# Patient Record
Sex: MALE | Race: WHITE | NOT HISPANIC OR LATINO | Employment: OTHER | URBAN - METROPOLITAN AREA
[De-identification: names, ages, dates, MRNs, and addresses within clinical notes are randomized per-mention and may not be internally consistent; named-entity substitution may affect disease eponyms.]

---

## 2017-07-14 ENCOUNTER — HOSPITAL ENCOUNTER (EMERGENCY)
Facility: HOSPITAL | Age: 42
Discharge: ANOTHER HOSPITAL | End: 2017-07-14
Attending: EMERGENCY MEDICINE
Payer: MEDICARE

## 2017-07-14 ENCOUNTER — HOSPITAL ENCOUNTER (INPATIENT)
Facility: HOSPITAL | Age: 42
LOS: 1 days | Discharge: HOME | DRG: 065 | End: 2017-07-15
Attending: EMERGENCY MEDICINE | Admitting: PSYCHIATRY & NEUROLOGY
Payer: MEDICARE

## 2017-07-14 VITALS
HEIGHT: 75 IN | WEIGHT: 207 LBS | HEART RATE: 74 BPM | OXYGEN SATURATION: 98 % | SYSTOLIC BLOOD PRESSURE: 146 MMHG | DIASTOLIC BLOOD PRESSURE: 83 MMHG | RESPIRATION RATE: 18 BRPM | BODY MASS INDEX: 25.74 KG/M2

## 2017-07-14 DIAGNOSIS — I63.9 ACUTE CVA (CEREBROVASCULAR ACCIDENT): Primary | ICD-10-CM

## 2017-07-14 DIAGNOSIS — I63.9 CEREBROVASCULAR ACCIDENT (CVA), UNSPECIFIED MECHANISM: ICD-10-CM

## 2017-07-14 DIAGNOSIS — R07.9 CHEST PAIN: ICD-10-CM

## 2017-07-14 DIAGNOSIS — Z92.82 TISSUE PLASMINOGEN ACTIVATOR (T-PA) ADMINISTERED AT OTHER FACILITY WITHIN 24 HOURS PRIOR TO CURRENT ADMISSION: ICD-10-CM

## 2017-07-14 DIAGNOSIS — I63.412 EMBOLIC STROKE INVOLVING LEFT MIDDLE CEREBRAL ARTERY: ICD-10-CM

## 2017-07-14 DIAGNOSIS — I63.9 STROKE: ICD-10-CM

## 2017-07-14 DIAGNOSIS — I63.312 THROMBOTIC STROKE INVOLVING LEFT MIDDLE CEREBRAL ARTERY: Primary | ICD-10-CM

## 2017-07-14 PROBLEM — G81.91 HEMIPARESIS, RIGHT: Status: ACTIVE | Noted: 2017-07-14

## 2017-07-14 PROBLEM — F17.200 CURRENT EVERY DAY SMOKER: Status: ACTIVE | Noted: 2017-07-14

## 2017-07-14 PROBLEM — E78.2 MIXED HYPERLIPIDEMIA: Status: ACTIVE | Noted: 2017-07-14

## 2017-07-14 PROBLEM — E78.00 HIGH CHOLESTEROL: Status: ACTIVE | Noted: 2017-07-14

## 2017-07-14 LAB
ABO + RH BLD: NORMAL
ALBUMIN SERPL BCP-MCNC: 3.2 G/DL
ALBUMIN SERPL BCP-MCNC: 3.3 G/DL
ALP SERPL-CCNC: 94 U/L
ALP SERPL-CCNC: 95 U/L
ALT SERPL W/O P-5'-P-CCNC: 14 U/L
ALT SERPL W/O P-5'-P-CCNC: 15 U/L
ANION GAP SERPL CALC-SCNC: 11 MMOL/L
ANION GAP SERPL CALC-SCNC: 9 MMOL/L
APTT BLDCRRT: 23.7 SEC
AST SERPL-CCNC: 13 U/L
AST SERPL-CCNC: 13 U/L
BASOPHILS # BLD AUTO: 0 K/UL
BASOPHILS # BLD AUTO: 0.04 K/UL
BASOPHILS NFR BLD: 0.2 %
BASOPHILS NFR BLD: 0.6 %
BILIRUB SERPL-MCNC: 0.2 MG/DL
BILIRUB SERPL-MCNC: 0.3 MG/DL
BILIRUB UR QL STRIP: NEGATIVE
BLD GP AB SCN CELLS X3 SERPL QL: NORMAL
BUN SERPL-MCNC: 6 MG/DL
BUN SERPL-MCNC: 6 MG/DL
CALCIUM SERPL-MCNC: 8.6 MG/DL
CALCIUM SERPL-MCNC: 9 MG/DL
CHLORIDE SERPL-SCNC: 105 MMOL/L
CHLORIDE SERPL-SCNC: 108 MMOL/L
CHOLEST/HDLC SERPL: 7.2 {RATIO}
CHOLEST/HDLC SERPL: 7.3 {RATIO}
CK MB SERPL-MCNC: 1.1 NG/ML
CK MB SERPL-RTO: 1.5 %
CK SERPL-CCNC: 71 U/L
CK SERPL-CCNC: 73 U/L
CLARITY UR REFRACT.AUTO: CLEAR
CO2 SERPL-SCNC: 23 MMOL/L
CO2 SERPL-SCNC: 24 MMOL/L
COLOR UR AUTO: YELLOW
CREAT SERPL-MCNC: 0.8 MG/DL
CREAT SERPL-MCNC: 0.9 MG/DL
DIASTOLIC DYSFUNCTION: NO
DIFFERENTIAL METHOD: ABNORMAL
DIFFERENTIAL METHOD: NORMAL
EOSINOPHIL # BLD AUTO: 0.2 K/UL
EOSINOPHIL # BLD AUTO: 0.2 K/UL
EOSINOPHIL NFR BLD: 3.2 %
EOSINOPHIL NFR BLD: 3.7 %
ERYTHROCYTE [DISTWIDTH] IN BLOOD BY AUTOMATED COUNT: 12 %
ERYTHROCYTE [DISTWIDTH] IN BLOOD BY AUTOMATED COUNT: 12.3 %
EST. GFR  (AFRICAN AMERICAN): >60 ML/MIN/1.73 M^2
EST. GFR  (AFRICAN AMERICAN): >60 ML/MIN/1.73 M^2
EST. GFR  (NON AFRICAN AMERICAN): >60 ML/MIN/1.73 M^2
EST. GFR  (NON AFRICAN AMERICAN): >60 ML/MIN/1.73 M^2
ESTIMATED AVG GLUCOSE: 103 MG/DL
GLUCOSE SERPL-MCNC: 104 MG/DL
GLUCOSE SERPL-MCNC: 144 MG/DL
GLUCOSE UR QL STRIP: NEGATIVE
HBA1C MFR BLD HPLC: 5.2 %
HCT VFR BLD AUTO: 40.5 %
HCT VFR BLD AUTO: 41.1 %
HDL/CHOLESTEROL RATIO: 13.8 %
HDL/CHOLESTEROL RATIO: 13.9 %
HDLC SERPL-MCNC: 201 MG/DL
HDLC SERPL-MCNC: 225 MG/DL
HDLC SERPL-MCNC: 28 MG/DL
HDLC SERPL-MCNC: 31 MG/DL
HGB BLD-MCNC: 14.4 G/DL
HGB BLD-MCNC: 14.5 G/DL
HGB UR QL STRIP: NEGATIVE
INR PPP: 1
INR PPP: 1
KETONES UR QL STRIP: NEGATIVE
LDLC SERPL CALC-MCNC: 129.4 MG/DL
LDLC SERPL CALC-MCNC: 140.2 MG/DL
LEUKOCYTE ESTERASE UR QL STRIP: NEGATIVE
LYMPHOCYTES # BLD AUTO: 2.7 K/UL
LYMPHOCYTES # BLD AUTO: 2.7 K/UL
LYMPHOCYTES NFR BLD: 38.9 %
LYMPHOCYTES NFR BLD: 41.7 %
MCH RBC QN AUTO: 30.1 PG
MCH RBC QN AUTO: 30.2 PG
MCHC RBC AUTO-ENTMCNC: 35.2 %
MCHC RBC AUTO-ENTMCNC: 35.6 %
MCV RBC AUTO: 85 FL
MCV RBC AUTO: 86 FL
MONOCYTES # BLD AUTO: 0.5 K/UL
MONOCYTES # BLD AUTO: 0.5 K/UL
MONOCYTES NFR BLD: 6.5 %
MONOCYTES NFR BLD: 8.4 %
NEUTROPHILS # BLD AUTO: 2.9 K/UL
NEUTROPHILS # BLD AUTO: 3.5 K/UL
NEUTROPHILS NFR BLD: 45.4 %
NEUTROPHILS NFR BLD: 51.2 %
NITRITE UR QL STRIP: NEGATIVE
NONHDLC SERPL-MCNC: 173 MG/DL
NONHDLC SERPL-MCNC: 194 MG/DL
PH UR STRIP: 7 [PH] (ref 5–8)
PLATELET # BLD AUTO: 264 K/UL
PLATELET # BLD AUTO: 277 K/UL
PMV BLD AUTO: 7.9 FL
PMV BLD AUTO: 9.5 FL
POCT GLUCOSE: 107 MG/DL (ref 70–110)
POCT GLUCOSE: 110 MG/DL (ref 70–110)
POCT GLUCOSE: 114 MG/DL (ref 70–110)
POCT GLUCOSE: 154 MG/DL (ref 70–110)
POTASSIUM SERPL-SCNC: 3.5 MMOL/L
POTASSIUM SERPL-SCNC: 4 MMOL/L
PROT SERPL-MCNC: 6.6 G/DL
PROT SERPL-MCNC: 6.7 G/DL
PROT UR QL STRIP: NEGATIVE
PROTHROMBIN TIME: 10 SEC
PROTHROMBIN TIME: 10.7 SEC
RBC # BLD AUTO: 4.78 M/UL
RBC # BLD AUTO: 4.79 M/UL
RETIRED EF AND QEF - SEE NOTES: 65 (ref 55–65)
SODIUM SERPL-SCNC: 140 MMOL/L
SODIUM SERPL-SCNC: 140 MMOL/L
SP GR UR STRIP: >=1.03 (ref 1–1.03)
TRIGL SERPL-MCNC: 218 MG/DL
TRIGL SERPL-MCNC: 269 MG/DL
TROPONIN I SERPL DL<=0.01 NG/ML-MCNC: <0.006 NG/ML
TROPONIN I SERPL DL<=0.01 NG/ML-MCNC: <0.006 NG/ML
TSH SERPL DL<=0.005 MIU/L-ACNC: 3.78 UIU/ML
URN SPEC COLLECT METH UR: ABNORMAL
UROBILINOGEN UR STRIP-ACNC: NEGATIVE EU/DL
WBC # BLD AUTO: 6.45 K/UL
WBC # BLD AUTO: 6.9 K/UL

## 2017-07-14 PROCEDURE — 84484 ASSAY OF TROPONIN QUANT: CPT | Mod: 91

## 2017-07-14 PROCEDURE — 82962 GLUCOSE BLOOD TEST: CPT

## 2017-07-14 PROCEDURE — 81003 URINALYSIS AUTO W/O SCOPE: CPT

## 2017-07-14 PROCEDURE — 25000003 PHARM REV CODE 250: Performed by: PHYSICIAN ASSISTANT

## 2017-07-14 PROCEDURE — 63600175 PHARM REV CODE 636 W HCPCS: Performed by: EMERGENCY MEDICINE

## 2017-07-14 PROCEDURE — 99223 1ST HOSP IP/OBS HIGH 75: CPT | Mod: AI,GC,, | Performed by: PSYCHIATRY & NEUROLOGY

## 2017-07-14 PROCEDURE — 93010 ELECTROCARDIOGRAM REPORT: CPT | Mod: ,,, | Performed by: INTERNAL MEDICINE

## 2017-07-14 PROCEDURE — 86900 BLOOD TYPING SEROLOGIC ABO: CPT

## 2017-07-14 PROCEDURE — 99291 CRITICAL CARE FIRST HOUR: CPT | Mod: ,,, | Performed by: EMERGENCY MEDICINE

## 2017-07-14 PROCEDURE — 82553 CREATINE MB FRACTION: CPT

## 2017-07-14 PROCEDURE — G8996 SWALLOW CURRENT STATUS: HCPCS | Mod: CI

## 2017-07-14 PROCEDURE — 85730 THROMBOPLASTIN TIME PARTIAL: CPT

## 2017-07-14 PROCEDURE — 20000000 HC ICU ROOM

## 2017-07-14 PROCEDURE — 83036 HEMOGLOBIN GLYCOSYLATED A1C: CPT

## 2017-07-14 PROCEDURE — 80061 LIPID PANEL: CPT

## 2017-07-14 PROCEDURE — 25000003 PHARM REV CODE 250: Performed by: NURSE PRACTITIONER

## 2017-07-14 PROCEDURE — 80061 LIPID PANEL: CPT | Mod: 91

## 2017-07-14 PROCEDURE — 93306 TTE W/DOPPLER COMPLETE: CPT

## 2017-07-14 PROCEDURE — G8997 SWALLOW GOAL STATUS: HCPCS | Mod: CH

## 2017-07-14 PROCEDURE — 80053 COMPREHEN METABOLIC PANEL: CPT

## 2017-07-14 PROCEDURE — G0425 INPT/ED TELECONSULT30: HCPCS | Mod: GT,,, | Performed by: PSYCHIATRY & NEUROLOGY

## 2017-07-14 PROCEDURE — G8979 MOBILITY GOAL STATUS: HCPCS | Mod: CH

## 2017-07-14 PROCEDURE — 92610 EVALUATE SWALLOWING FUNCTION: CPT

## 2017-07-14 PROCEDURE — 85610 PROTHROMBIN TIME: CPT

## 2017-07-14 PROCEDURE — 36415 COLL VENOUS BLD VENIPUNCTURE: CPT

## 2017-07-14 PROCEDURE — 94761 N-INVAS EAR/PLS OXIMETRY MLT: CPT

## 2017-07-14 PROCEDURE — 85610 PROTHROMBIN TIME: CPT | Mod: 91

## 2017-07-14 PROCEDURE — 96374 THER/PROPH/DIAG INJ IV PUSH: CPT

## 2017-07-14 PROCEDURE — 97161 PT EVAL LOW COMPLEX 20 MIN: CPT

## 2017-07-14 PROCEDURE — 99291 CRITICAL CARE FIRST HOUR: CPT | Mod: 25

## 2017-07-14 PROCEDURE — 80307 DRUG TEST PRSMV CHEM ANLYZR: CPT

## 2017-07-14 PROCEDURE — 85025 COMPLETE CBC W/AUTO DIFF WBC: CPT | Mod: 91

## 2017-07-14 PROCEDURE — 82550 ASSAY OF CK (CPK): CPT | Mod: 91

## 2017-07-14 PROCEDURE — 93005 ELECTROCARDIOGRAM TRACING: CPT

## 2017-07-14 PROCEDURE — 25500020 PHARM REV CODE 255: Performed by: EMERGENCY MEDICINE

## 2017-07-14 PROCEDURE — 85025 COMPLETE CBC W/AUTO DIFF WBC: CPT

## 2017-07-14 PROCEDURE — 84443 ASSAY THYROID STIM HORMONE: CPT

## 2017-07-14 PROCEDURE — 93306 TTE W/DOPPLER COMPLETE: CPT | Mod: 26,,, | Performed by: INTERNAL MEDICINE

## 2017-07-14 PROCEDURE — 86850 RBC ANTIBODY SCREEN: CPT

## 2017-07-14 PROCEDURE — 99233 SBSQ HOSP IP/OBS HIGH 50: CPT | Mod: GC,,, | Performed by: PSYCHIATRY & NEUROLOGY

## 2017-07-14 PROCEDURE — 37195 THROMBOLYTIC THERAPY STROKE: CPT

## 2017-07-14 PROCEDURE — 84484 ASSAY OF TROPONIN QUANT: CPT

## 2017-07-14 PROCEDURE — 80053 COMPREHEN METABOLIC PANEL: CPT | Mod: 91

## 2017-07-14 PROCEDURE — G8978 MOBILITY CURRENT STATUS: HCPCS | Mod: CH

## 2017-07-14 RX ORDER — LEVOTHYROXINE SODIUM 25 UG/1
25 TABLET ORAL DAILY
COMMUNITY
End: 2020-12-23

## 2017-07-14 RX ORDER — OXYCODONE HYDROCHLORIDE 5 MG/1
15 TABLET ORAL 3 TIMES DAILY
Status: DISCONTINUED | OUTPATIENT
Start: 2017-07-14 | End: 2017-07-15 | Stop reason: HOSPADM

## 2017-07-14 RX ORDER — DIAZEPAM 10 MG/1
10 TABLET ORAL NIGHTLY PRN
COMMUNITY
End: 2017-10-13

## 2017-07-14 RX ORDER — LABETALOL HYDROCHLORIDE 5 MG/ML
10 INJECTION, SOLUTION INTRAVENOUS
Status: DISCONTINUED | OUTPATIENT
Start: 2017-07-14 | End: 2017-07-15 | Stop reason: HOSPADM

## 2017-07-14 RX ORDER — DIAZEPAM 5 MG/1
10 TABLET ORAL NIGHTLY PRN
Status: DISCONTINUED | OUTPATIENT
Start: 2017-07-14 | End: 2017-07-15 | Stop reason: HOSPADM

## 2017-07-14 RX ORDER — ACETAMINOPHEN 325 MG/1
650 TABLET ORAL EVERY 6 HOURS PRN
Status: DISCONTINUED | OUTPATIENT
Start: 2017-07-14 | End: 2017-07-15 | Stop reason: HOSPADM

## 2017-07-14 RX ORDER — IBUPROFEN 200 MG
1 TABLET ORAL DAILY
Status: DISCONTINUED | OUTPATIENT
Start: 2017-07-14 | End: 2017-07-15 | Stop reason: HOSPADM

## 2017-07-14 RX ORDER — GABAPENTIN 300 MG/1
600 CAPSULE ORAL 2 TIMES DAILY
Status: DISCONTINUED | OUTPATIENT
Start: 2017-07-14 | End: 2017-07-15 | Stop reason: HOSPADM

## 2017-07-14 RX ORDER — ROSUVASTATIN CALCIUM 20 MG/1
20 TABLET, COATED ORAL DAILY
Status: DISCONTINUED | OUTPATIENT
Start: 2017-07-14 | End: 2017-07-15

## 2017-07-14 RX ORDER — OXYCODONE HYDROCHLORIDE 15 MG/1
15 TABLET, FILM COATED, EXTENDED RELEASE ORAL EVERY 12 HOURS PRN
COMMUNITY
End: 2020-12-23

## 2017-07-14 RX ORDER — GABAPENTIN 300 MG/1
1200 CAPSULE ORAL 2 TIMES DAILY
Status: DISCONTINUED | OUTPATIENT
Start: 2017-07-14 | End: 2017-07-15 | Stop reason: HOSPADM

## 2017-07-14 RX ORDER — SODIUM CHLORIDE 0.9 % (FLUSH) 0.9 %
3 SYRINGE (ML) INJECTION EVERY 8 HOURS
Status: DISCONTINUED | OUTPATIENT
Start: 2017-07-14 | End: 2017-07-15 | Stop reason: HOSPADM

## 2017-07-14 RX ORDER — GABAPENTIN 600 MG/1
600 TABLET ORAL 3 TIMES DAILY
COMMUNITY
End: 2021-02-23 | Stop reason: SDUPTHER

## 2017-07-14 RX ORDER — AMOXICILLIN 250 MG
1 CAPSULE ORAL DAILY
Status: DISCONTINUED | OUTPATIENT
Start: 2017-07-14 | End: 2017-07-15 | Stop reason: HOSPADM

## 2017-07-14 RX ORDER — IBUPROFEN 200 MG
1 TABLET ORAL DAILY
Status: DISCONTINUED | OUTPATIENT
Start: 2017-07-14 | End: 2017-07-14

## 2017-07-14 RX ADMIN — GABAPENTIN 1200 MG: 300 CAPSULE ORAL at 09:07

## 2017-07-14 RX ADMIN — OXYCODONE HYDROCHLORIDE 15 MG: 5 TABLET ORAL at 01:07

## 2017-07-14 RX ADMIN — ROSUVASTATIN CALCIUM 20 MG: 20 TABLET, FILM COATED ORAL at 10:07

## 2017-07-14 RX ADMIN — ACETAMINOPHEN 650 MG: 325 TABLET ORAL at 05:07

## 2017-07-14 RX ADMIN — OXYCODONE HYDROCHLORIDE 15 MG: 5 TABLET ORAL at 07:07

## 2017-07-14 RX ADMIN — ALTEPLASE 76 MG: KIT at 05:07

## 2017-07-14 RX ADMIN — NICOTINE 1 PATCH: 14 PATCH, EXTENDED RELEASE TRANSDERMAL at 08:07

## 2017-07-14 RX ADMIN — GABAPENTIN 1200 MG: 300 CAPSULE ORAL at 05:07

## 2017-07-14 RX ADMIN — ACETAMINOPHEN 650 MG: 325 TABLET ORAL at 09:07

## 2017-07-14 RX ADMIN — Medication 3 ML: at 01:07

## 2017-07-14 RX ADMIN — ACETAMINOPHEN 650 MG: 325 TABLET ORAL at 12:07

## 2017-07-14 RX ADMIN — NICOTINE 1 PATCH: 21 PATCH, EXTENDED RELEASE TRANSDERMAL at 01:07

## 2017-07-14 RX ADMIN — STANDARDIZED SENNA CONCENTRATE AND DOCUSATE SODIUM 1 TABLET: 8.6; 5 TABLET, FILM COATED ORAL at 01:07

## 2017-07-14 RX ADMIN — Medication 3 ML: at 09:07

## 2017-07-14 RX ADMIN — IOHEXOL 100 ML: 350 INJECTION, SOLUTION INTRAVENOUS at 05:07

## 2017-07-14 RX ADMIN — DIAZEPAM 10 MG: 5 TABLET ORAL at 09:07

## 2017-07-14 RX ADMIN — GABAPENTIN 600 MG: 300 CAPSULE ORAL at 01:07

## 2017-07-14 NOTE — ED NOTES
"Pt given Tylenol for complaint of back pain, pt with rods to back.  Pt states that he starts with pain if he lays down too long.  Pain rated 10/10.  Pt reports that he usually takes "roxicontin" for pain and he will talk to the doctor about his pain meds.  "

## 2017-07-14 NOTE — PLAN OF CARE
Problem: SLP Goal  Goal: SLP Goal  Speech Language Pathology Goals  Goals expected to be met by 7/21    1. Pt will participate in speech, language and cognitive assessment to rule out deficits s/p neurological event.   2. Pt will tolerate regular diet with thin liquids with no overt s/s of aspiration.             Recommendations:  1. Regular consistency diet  2. Thin liquids  3. Standard Aspiration Precautions      Christin Luz, CCC-SLP  Speech Language Pathologist  [phone]  7/14/2017

## 2017-07-14 NOTE — ED NOTES
"Pt reports headache "better", rated 2/10.  Pt states " I just think I need to eat".  Informed pt that speech therapist, Michelle, will be at the bedside soon for swallow study.  Pt v/u.  Siderails up x 2/call light in reach, family at bedside.  Lights dimmed for comfort.  "

## 2017-07-14 NOTE — ED NOTES
"Pt reports back pain 6/10, states " I usually stay at a 5 to an 8".  Pt without complaints at present.  Awaiting clean room assignment.  Siderails up x 2/call light in reach.  Family remains at bedside.  "

## 2017-07-14 NOTE — PLAN OF CARE
Problem: Physical Therapy Goal  Goal: Physical Therapy Goal  Goals to be met by: 17     Patient will increase functional independence with mobility by performin. Supine to sit with [place]  2. Sit to supine with [place]  3. Gait  x 50 feet with [place] with or without using Single-point Cane .   4. Ascend/descend 4 stair with right Handrails Supervision.           PT evaluation completed. POC and goals established.    Stephany Lagunas, PT, DPT  2017

## 2017-07-14 NOTE — PT/OT/SLP EVAL
"Speech Language Pathology  Evaluation    Michael Alonzo   MRN: [id number]   Admitting Diagnosis: Acute ischemic stroke    Diet recommendations: Solid Diet Level: Regular  Liquid Diet Level: Thin Small bites/sips, Alternating bites/sips, 1 bite/sip at a time, Remain upright 30 minutes post meal, Meds whole 1 at a time, Eliminate distractions and Avoid talking while eating    SLP Treatment Date: 07/14/17  Speech Start Time: 1050     Speech Stop Time: 1105     Speech Total (min): 15 min       TREATMENT BILLABLE MINUTES:  Eval Swallow and Oral Function 15    Diagnosis: Acute ischemic stroke      Past Medical History:   Diagnosis Date    Arthritis     High cholesterol      Past Surgical History:   Procedure Laterality Date    SPINAL FIXATION SURGERY         Has the patient been evaluated by SLP for swallowing? : Yes  Keep patient NPO?: No   General Precautions: Standard, aspiration, fall    Current Respiratory Status:  (room air)     Social Hx: Patient lives with wife and 3 sons.    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    Subjective:  Patient is awake and alert. Sitting fully upright on stretcher making jokes. Family at .   Patient goals: "Get out of her ASAP"    Pain/Comfort  Pain Rating 1: 9/10  Location - Side 1: Bilateral  Location 1: other (see comments) (body (head, R shoulder, lower body (left and right))  Pain Addressed 1: Nurse notified  Pain Rating Post-Intervention 1: 9/10    Objective:   Patient found with: blood pressure cuff, peripheral IV    Oral Musculature Evaluation  Oral Musculature: very Slight facial droop on right  Dentition: edentulous (Dentures were not present, but patient eats in absence of dentures.)  Mucosal Quality: adequate  Mandibular Strength and Mobility: WFL  Oral Labial Strength and Mobility: WFL  Lingual Strength and Mobility: WFL  Velar Elevation: " WFL  Buccal Strength and Mobility: WFL  Volitional Cough: strong/productive  Volitional Swallow: adequate laryngeal excursion to finger palpation  Voice Prior to PO Intake: clear, strong     Bedside Swallow Eval:  Consistencies Assessed: Thin liquids (~4oz of water via cup and straw), Puree (4 tsps of pudding ) and Solids (1 full cracker)  Oral Phase: prolonged mastication of solids 2/2 to being edentulous otherwise oral phase WFL for all other consistencies.  Pharyngeal Phase: no overt clinical  signs/symptoms of aspiration and no overt clinical signs/symptoms of pharyngeal dysphagia    Additional Treatment:      FIM:  Social Interaction: 6 Complete [place]--The patient interacts appropriately with staff, other patients, and family members (e.g., controls temper, accepts criticism, is aware that words and actions have an impact on others), and does not require medication for                            Assessment:  Michael Alonzo is a 42 y.o. male with a medical diagnosis of Acute ischemic stroke and presents with risk for aspiration.    Do you have any cultural, spiritual, Hindu conflicts, given your current situation?: none reported     Discharge recommendations: Discharge Facility/Level Of Care Needs: other (see comments) (TBD post cognitve evaluation)     Goals:    SLP Goals        Problem: SLP Goal    Goal Priority Disciplines Outcome   SLP Goal     SLP    Description:  Speech Language Pathology Goals  Goals expected to be met by 7/21    1. Pt will participate in speech, language and cognitive assessment to rule out deficits s/p neurological event.   2. Pt will tolerate regular diet with thin liquids with no overt s/s of aspiration.                            Plan:   Patient to be seen Therapy Frequency: 3 x/week   Plan of Care expires: 08/13/17  Plan of Care reviewed with: patient, son, friend  SLP Follow-up?: Yes              Michelle Rodriguez CCC-SLP  07/14/2017

## 2017-07-14 NOTE — ED PROVIDER NOTES
"Encounter Date: 7/14/2017    SCRIBE #1 NOTE: I, Ifrah Suazo, am scribing for, and in the presence of,  Dr. Elizondo. I have scribed the following portions of the note - Other sections scribed: HPI, ROS, PE.       History     Chief Complaint   Patient presents with    Cerebrovascular Accident     tPa transfer from [facility]     Time patient was seen by the provider: 5:52 AM       The patient is a 42 y.o. male that presents to the ED as a transfer from [facility]. Pt presented to [place] [facility] ED at 4:23 am for acute onset of right sided weakness. Pt had gone to bed at 1 am and woke up when he "felt a jolt" at 2 am, developing right sided facial numbness and right upper and lower extremity weakness. In the ED he had significant weakness to his right upper extremities with decreased sensation on the right side. Telemedicine stroke was consulted and the decision was made to administer TPA to the pt. He was transferred here for further management.    On our initial assessment, pt c/o diffuse pain all over his body.  Reports persistent R sided weakness/numbness.  Denies fever/chills      The history is provided by the patient and medical records.     Review of patient's allergies indicates:   Allergen Reactions    Tramadol Anaphylaxis    Tomato (solanum lycopersicum) Hives     Past Medical History:   Diagnosis Date    Arthritis     High cholesterol      Past Surgical History:   Procedure Laterality Date    SPINAL FIXATION SURGERY       History reviewed. No pertinent family history.  Social History   Substance Use Topics    Smoking status: Current Every Day Smoker     Packs/day: 0.50     Types: Cigarettes    Smokeless tobacco: Never Used    Alcohol use No     Review of Systems   Constitutional: Negative for fever.   HENT: Negative for mouth sores.    Eyes: Negative for photophobia.   Respiratory: Negative for apnea.    Gastrointestinal: Negative for blood in stool.   Genitourinary: Negative for " genital sores.   Musculoskeletal: Negative for neck pain.   Skin: Negative for rash.   Neurological: Positive for weakness (Right upper and lower extremities.) and numbness (Right sided facial).   Psychiatric/Behavioral: Negative for self-injury.       Physical Exam     Initial Vitals [07/14/17 0551]   BP Pulse Resp Temp SpO2   -- -- -- -- 98 %      MAP       --         Physical Exam    Nursing note and vitals reviewed.  Constitutional: He appears well-developed and well-nourished. He is not diaphoretic. No distress.   HENT:   Head: Normocephalic and atraumatic.   Mouth/Throat: Oropharynx is clear and moist. No oropharyngeal exudate.   Eyes: Conjunctivae and EOM are normal. Pupils are equal, round, and reactive to light.   Neck: Normal range of motion. Neck supple.   Cardiovascular: Normal rate and regular rhythm.   Pulmonary/Chest: Breath sounds normal.   Abdominal: He exhibits no distension. There is no tenderness.   Musculoskeletal: He exhibits no edema.   Neurological: He is alert. A sensory deficit is present. No cranial nerve deficit.   + R pronator drift,  strength 4/5 R, 5/5 L,  LE strength 4/5 R, 5/5 L    No clear aphasia   Skin: Skin is warm and dry. No rash noted.   Psychiatric: He has a normal mood and affect.         ED Course   Critical Care  Date/Time: 7/14/2017 6:18 AM  Performed by: HARISH GUEVARA.  Authorized by: HARISH GUEVARA   Total critical care time (exclusive of procedural time) : 30 minutes  Critical care time was exclusive of separately billable procedures and treating other patients and teaching time.  Critical care was necessary to treat or prevent imminent or life-threatening deterioration of the following conditions: CNS failure or compromise.  Critical care was time spent personally by me on the following activities: discussions with consultants, evaluation of patient's response to treatment, obtaining history from patient or surrogate, ordering and review of laboratory  studies, pulse oximetry, review of old charts, re-evaluation of patient's condition, ordering and review of radiographic studies, ordering and performing treatments and interventions, examination of patient and development of treatment plan with patient or surrogate.        Labs Reviewed   LIPID PANEL - Abnormal; Notable for the following:        Result Value    Cholesterol 201 (*)     Triglycerides 218 (*)     HDL 28 (*)     HDL/Chol Ratio 13.9 (*)     Total Cholesterol/HDL Ratio 7.2 (*)     All other components within normal limits    Narrative:     add on per dr Elizondo order [id number] CPK @0632 7/14/17   COMPREHENSIVE METABOLIC PANEL - Abnormal; Notable for the following:     Calcium 8.6 (*)     Albumin 3.2 (*)     All other components within normal limits   URINALYSIS - Abnormal; Notable for the following:     Specific Gravity, UA >=1.030 (*)     All other components within normal limits   HEMOGLOBIN A1C    Narrative:     add on per dr Elizondo order [id number] CPK @0632 7/14/17   CK   CK    Narrative:     add on per dr Elizondo order [id number] CPK @0632 7/14/17   TROPONIN I   CK-MB   CBC W/ AUTO DIFFERENTIAL   APTT   PROTIME-INR   TYPE & SCREEN   POCT GLUCOSE     EKG Readings: (Independently Interpreted)   Initial Reading: No STEMI. Rhythm: Normal Sinus Rhythm. Ectopy: No Ectopy. Conduction: Normal. ST Segments: Normal ST Segments. T Waves: Normal.          Medical Decision Making:   History:   Old Medical Records: I decided to obtain old medical records.  Old Records Summarized: records from another hospital.  Initial Assessment:   43 yo m, h/o smoking, here with acute R sided weakness/numbness, now s/p TPA at OSH.  Deficits persist on exam here.  Unclear what to make of diffuse myalgias/arthralgias.  Seems to have R UE weakness but exam also limited by pain with movement at R shoulder  Differential Diagnosis:   CVA vs rhabdo vs musculoskeletal issue  ED Management:  -stroke team notified on arrival  -to be  admitted to neuro ICU  -added CPK to r/o rhabdo  -frequent neuro checks            Scribe Attestation:   Scribe #1: I performed the above scribed service and the documentation accurately describes the services I performed. I attest to the accuracy of the note.    Attending Attestation:           Physician Attestation for Scribe:  Physician Attestation Statement for Scribe #1: I, Dr. Elizondo, reviewed documentation, as scribed by Ifrah Suazo in my presence, and it is both accurate and complete.                 ED Course     Clinical Impression:   The primary encounter diagnosis was tPA aborted, MRI negative thrombotic L MCA. Diagnoses of Stroke, Cerebrovascular accident (CVA), unspecified mechanism, Tissue plasminogen activator (t-PA) administered at other facility within 24 hours prior to current admission, Chest pain, and Embolic stroke involving left middle cerebral artery were also pertinent to this visit.                           Stephany Elizondo MD  07/23/17 0654

## 2017-07-14 NOTE — HPI
41 y/o male who woke up at 0100 to go to the  and went back to bed. Woke up again at 0200 and felt funny with numbness, weakness on the right and his son states that he listed to the right. He was takne to [facility] and with no improvement in symptoms telemedicine was done with Dr Baez and with neg CT scan head recommendation was to give IV TPA and transfer to [facility].   Upon arrival patient states that he has chronic pain issues with his right shoulder from old accident and has trouble lifting his arm plus he has chronic back pain has had 9 surgeries.   Patient still with some right sided numbness the right sided just feels different that the left with some heaviness.   Denies visual disturbance, aphasia, dysarthria, headache  Risk factors obesity, HLP, smoker

## 2017-07-14 NOTE — ED NOTES
Informed by ED charge nurse, room # will be removed and re-assigned.  Pt and family updated on status and V/U

## 2017-07-14 NOTE — PT/OT/SLP EVAL
"Physical Therapy  Evaluation    Michael Alonzo   MRN: [id number]   Admitting Diagnosis: Acute ischemic stroke    PT Received On: 07/14/17  PT Start Time: 1330     PT Stop Time: 1338    PT Total Time (min): 8 min       Billable Minutes:  Evaluation 8     Personal factors/comorbidities: high cholesterol; arthritis; smoker. The listed co-morbidities and personal factors impact pt's current level and progress with functional mobility and independence.   Body systems elements affected: head, back, lower extremities, musculoskeletal system, neuromuscular system, cardiovascular  Clinical Presentation: stable  Functional Outcome Tools: AMPAC, MMT, ROM  Evaluation Complexity Level: low      Diagnosis: Acute ischemic stroke  S/p tPA    Past Medical History:   Diagnosis Date    Arthritis     High cholesterol       Past Surgical History:   Procedure Laterality Date    SPINAL FIXATION SURGERY         Referring physician: Josie Martinez PA-C  Date referred to PT: 07/14/17      General Precautions: Standard, aspiration, fall, NPO  Orthopedic Precautions: N/A   Braces: N/A       Do you have any cultural, spiritual, Jainism conflicts, given your current situation?: none stated    Patient History:  Pt lives with wife and 3 kids in a [facility], with 3 [employer]. PTA, pt was (I) with all mobility and ADLs, including driving and working. Pt owns a tire shop. From [place], [place]. Pt reports sometimes using [facility] due to back pain. Bath has walk-in shower with built-in seat & grab bar. Family able to assist upon d/c DME available: [facility]  Roles/reponsibilities: father, , business owner, financial provider         Subjective:  Communicated with RN prior to session.  Pt appropriate for PT evaluation.     "I'm about to get up and leave out of this place. They need to let me go smoke a cigarette."    Chief Complaint: No pain reported during visit; pt denied. Pt aggravated about wanting to smoke a cigarette.   Patient goals: To go home and return to " PLOF    Pain/Comfort  Pain Rating 1:  (no pain stated)  Pain Rating Post-Intervention 1: 0/10 (none stated)      Objective:   Patient found with: blood pressure cuff, peripheral IV, pulse ox (continuous), telemetry     Cognitive Exam:  Oriented to: Person, Place, Time and Situation    Follows Commands/attention: Follows multistep  commands  Communication: clear/fluent  Safety awareness/insight to disability: impaired    Physical Exam:  Postural examination/scapula alignment: Rounded shoulder and Head forward    Skin integrity: Visible skin intact  Edema: None noted     Sensation:   Intact    Upper Extremity Range of Motion:  Right Upper Extremity: WFL  Left Upper Extremity: WFL    Upper Extremity Strength:  Right Upper Extremity: grossly 4+/5 throughout  Left Upper Extremity: grossly 4+/5 throughout    Lower Extremity Range of Motion:  Right Lower Extremity: WFL  Left Lower Extremity: WFL    Lower Extremity Strength:  Right Lower Extremity: grossly 4+/5 throughout  Left Lower Extremity: grossly 4+/5 throughout     Fine motor coordination:  Intact    Gross motor coordination: WFL    Functional Mobility:  Bed Mobility:  Supine to Sit:  Modified Independent HOB elevated  Sit to supine: Modified Independent HOB elevated  Scooting: Independent towards edge of stretcher      Transfers:   Sit to stand:Stand-by Assistance with No Assistive Device from stretcher; supervision for lines & safety      Gait:   Patient ambulated ~4 feet toward HOB with No Assistive Device with Contact Guard Assistance.  Pt demonstrated step-to gait with appropriate weight-shift and foot clearance with no LOB noted.      Stairs:   Unable to assess due to tPA window      Balance:  Static Sitting: Independent   Dynamic Sitting: Independent   Static Standing: Supervision or Set-up Assistance   Dynamic Standing: Supervision or Set-up Assistance         Education:  Education provided to pt and pt's son regarding: PT role/POC; safety with mobility; risk  "of bleeding with tPA and safety to remain in bed for 24 monitoring. Verbalized understanding, but disregard for possible adverse reactions. When PT advised pt that with medication he is at risk for further bleeding, pt reported "Well whatever happens is just going to have to happen, I'm about to go home, if they don't do something."     Whiteboard updated with correct mobility information. RN/PCT notified.  Pt ok to transfer via stand pivot or walk short-distances with RN/PCT assist after tPA window. Pt's mobility is restrictive due to tPA window. No AD required.       AM-PAC 6 CLICK MOBILITY  How much help from another person does this patient currently need?   1 = Unable, Total/Dependent Assistance  2 = A lot, Maximum/Moderate Assistance  3 = A little, Minimum/Contact Guard/Supervision  4 = None, Modified [place]/Independent    Turning over in bed (including adjusting bedclothes, sheets and blankets)?: 4  Sitting down on and standing up from a chair with arms (e.g., wheelchair, bedside commode, etc.): 4  Moving from lying on back to sitting on the side of the bed?: 4  Moving to and from a bed to a chair (including a wheelchair)?: 4  Need to walk in hospital room?: 4  Climbing 3-5 steps with a railing?: 4  Total Score: 24     AM-PAC Raw Score CMS G-Code Modifier Level of Impairment Assistance   6 % Total / Unable   7 - 9 CM 80 - 100% Maximal Assist   10 - 14 CL 60 - 80% Moderate Assist   15 - 19 CK 40 - 60% Moderate Assist   20 - 22 CJ 20 - 40% Minimal Assist   23 CI 1-20% SBA / CGA   24 CH 0% Independent/ Mod I     Patient left supine with all lines intact and call button in reach.    Assessment:   Michael Alonzo is a 42 y.o. male with a medical diagnosis of Acute ischemic stroke and presents with decrease endurance and generalized weakness.  Pt tolerated session well, but demonstrated frustration with being in hospital.  PT's overall gait and balance assessment limited this date due to pt still " within tPA 24 hr window.   PT to follow up 1 more visit to assess higher gait/balance and safety on stairs. PT recommends d/c to Home with anticipation of no further PT.       Rehab identified problem list/impairments: Rehab identified problem list/impairments: weakness, impaired endurance    Rehab potential is good.    Activity tolerance: Good    Discharge recommendations: Discharge Facility/Level Of Care Needs: home     Barriers to discharge: Barriers to Discharge: None    Equipment recommendations: Equipment Needed After Discharge: none     GOALS:    Physical Therapy Goals        Problem: Physical Therapy Goal    Goal Priority Disciplines Outcome Goal Variances Interventions   Physical Therapy Goal     PT/OT, PT      Description:  Goals to be met by: 17     Patient will increase functional independence with mobility by performin. Supine to sit with [place]  2. Sit to supine with [place]  3. Gait  x 50 feet with [place] with or without using Single-point Cane .   4. Ascend/descend 4 stair with right Handrails Supervision.                       PLAN:    Patient to be seen 2 x/week to address the above listed problems via gait training, therapeutic activities, therapeutic exercises, neuromuscular re-education  Plan of Care expires: 17  Plan of Care reviewed with: patient, son    Functional Assessment Tool Used: ampac  Score: 24  Functional Limitation: Mobility: Walking and moving around  Mobility: Walking and Moving Around Current Status (): EYLA  Mobility: Walking and Moving Around Goal Status (): EYAL Lagunas, PT  2017

## 2017-07-14 NOTE — SUBJECTIVE & OBJECTIVE
Past Medical History:   Diagnosis Date    Arthritis     High cholesterol      Past Surgical History:   Procedure Laterality Date    SPINAL FIXATION SURGERY       History reviewed. No pertinent family history.  Social History   Substance Use Topics    Smoking status: Current Every Day Smoker     Packs/day: 0.50     Types: Cigarettes    Smokeless tobacco: Never Used    Alcohol use No     Review of patient's allergies indicates:   Allergen Reactions    Tramadol Anaphylaxis    Tomato (solanum lycopersicum) Hives     Medications: I have reviewed the current medication administration record.      (Not in a hospital admission)    Review of Systems   Constitutional: Negative for chills and fever.   HENT: Negative for ear discharge and ear pain.    Eyes: Negative for pain and redness.   Respiratory: Negative for cough and shortness of breath.    Cardiovascular: Negative for palpitations and leg swelling.   Gastrointestinal: Negative for abdominal distention and abdominal pain.   Endocrine: Negative for polyphagia and polyuria.   Genitourinary: Negative for difficulty urinating and dysuria.   Musculoskeletal: Positive for arthralgias and back pain.   Skin: Negative for rash and wound.   Neurological: Positive for weakness and numbness.   Psychiatric/Behavioral: Negative for agitation and confusion.     Objective:     Vital Signs (Most Recent):  Temp: 98.2 °F (36.8 °C) (07/14/17 0551)  Pulse: 61 (07/14/17 0647)  Resp: 16 (07/14/17 0647)  BP: (!) 143/72 (07/14/17 0647)  SpO2: 98 % (room air) (07/14/17 0647)    Vital Signs Range (Last 24H):  Temp:  [98.2 °F (36.8 °C)]   Pulse:  [60-75]   Resp:  [16-22]   BP: (110-152)/(54-99)   SpO2:  [95 %-100 %]     Physical Exam   Constitutional: He is oriented to person, place, and time. He appears well-developed and well-nourished. No distress.   obese   HENT:   Head: Normocephalic and atraumatic.   Eyes: EOM are normal. Pupils are equal, round, and reactive to light.    Cardiovascular: Normal rate.    Pulmonary/Chest: Effort normal.   Abdominal: Soft.   Neurological: He is alert and oriented to person, place, and time. GCS eye subscore is 4 - spontaneous. GCS verbal subscore is 5 - oriented. GCS motor subscore is 6 - obeys commands.   Right sided numbness and weakness   Skin: Skin is warm and dry. He is not diaphoretic.   Nursing note and vitals reviewed.      Neurological Exam:   LOC: alert and follows requests  Language: No aphasia  Speech: No dysarthria  Orientation: Person, Place, Time  Memory: Recent memory intact, Remote memory intact, Age correct, Month correct  Visual Fields (CN II): Full  EOM (CN III, IV, VI): Full/intact  Oculocephalics: normal  Pupils (CN III, IV, VI): PERRL  Facial Sensation (CN V): Symmetric, Corneal reflex intact  Facial Movement (CN VII): symmetric facial expression  Hearing (CN VIII): intact bilaterally  Gag Reflex (CN IX, X): normal/symmetric  Shoulder/Neck (CN XI): SCM-Left: Normal ; SCM-Right: Normal ; Shoulder Shrug: Normal/Symetric  Tongue (CN XII): to midline  Reflexes: flexor plantar responses bilaterally  Motor*: Arm Left:  Normal (5/5), Leg Left:   Normal (5/5), Arm Right:   Paretic:  3/5, Leg Right:   Paretic:  4/5  Cerebellar*: Not testable due to laying on stretcher  Sensation: decrease sensation on the right  Tone: Arm-Left: normal; Leg-Left: normal; Arm-Right: normal; Leg-Right: normal    Stroke Team Times:   Date and Time Taken: 7/14/2017 6:00 AM  Last Known Normal Date and Time : 7/14/201701:00  Symptom Onset Date and Time:7/14/201702:00  Stroke Team Called Date and Time: 7/14/201704:30  Stroke Team Arrived Date and Time: 7/14/201706:10  CT Interpretation Time: 04:23  Intervention Time: 05:02 (TPA)  NIH Stroke Scale:  Interval: 1 hour post tPA or endovascular procedure completion  Level of Consciousness: 0 - alert  LOC Questions: 0 - answers both correctly  LOC Commands: 0 - performs both correctly  Best Gaze: 0 - normal  Visual: 0  - no visual loss  Facial Palsy: 0 - normal  Motor Left Arm: 0 - no drift  Motor Right Arm: 2 - can't resist gravity  Motor Left Le - no drift  Motor Right Le - can't resist gravity  Limb Ataxia: 0 - absent  Sensory: 1 - mild to moderate loss  Best Language: 0 - no aphasia  Dysarthria: 0 - normal articulation  Extinction and Inattention: 0 - no neglect  NIH Stroke Scale Total: 5  Ubaldo Coma Scale:  Best Eye Response: 4 - spontaneous  Best Motor Response: 6 - obeys commands  Best Verbal Response: 5 - oriented  [place] Coma Scale Total: 15  Modified [place] Scale:   Timeline: Prior to symptoms onset  Modified Kelly Score: 0 - no symptoms        Laboratory:  CMP:   Recent Labs  Lab 17  045   CALCIUM 9.0   ALBUMIN 3.3*   PROT 6.7      K 3.5   CO2 24      BUN 6   CREATININE 0.9   ALKPHOS 94   ALT 14   AST 13   BILITOT 0.2     CBC:   Recent Labs  Lab 17   WBC 6.90   RBC 4.79   HGB 14.5   HCT 41.1      MCV 86   MCH 30.2   MCHC 35.2     Lipid Panel:   Recent Labs  Lab 17  0612   CHOL 201*   LDLCALC 129.4   HDL 28*   TRIG 218*     Coagulation:   Recent Labs  Lab 17  0450   INR 1.0     Platelet Aggregation Study: No results for input(s): PLTAGG, PLTAGINTERP, PLTAGREGLACO, ADPPLTAGGREG in the last 168 hours.  Hgb A1C: No results for input(s): HGBA1C in the last 168 hours.  TSH:   Recent Labs  Lab 17  0450   TSH 3.776       Diagnostic Results:  Brain Imaging:   CT head w/o contrast 17 results:    CTA Head and neck multiphasic 17 results:  Noncontrast head CT demonstrates no evidence of acute intracranial abnormality.    CT angiogram of the head and neck demonstrates no evidence of high-grade stenosis or occlusion. The vertebrobasilar system is noted be somewhat diminutive in caliber although patent. Persistent left-sided fetal circulation is noted.    Aberrant right subclavian artery.    Cardiac Evaluation:  EKG/Telemetry: 17 results:  Normal sinus  rhythm with sinus arrhythmia

## 2017-07-14 NOTE — PLAN OF CARE
Problem: Patient Care Overview  Goal: Plan of Care Review  Pt on tPA monitoring. VS and assessment per flow sheet, patient progressing towards goal as tolerated. Plan of care reviewed with Michael Alonzo and family, all concerns addressed. Will continue to monitor.
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
## 2017-07-14 NOTE — ASSESSMENT & PLAN NOTE
--admit to [facility]  --hourly neuro checcks  --sp <180  --MRI/CT at 24 hours- verify MRI compatibility with history of metal hardware  --f/u echo  --PT/OT speech therapy

## 2017-07-14 NOTE — ED NOTES
"Pt awake in bed, family at bedside.  Pt with generalized body aches that he reports "I always have aches and pains but they been worse the last couple of days".  Pt reports improvement in strength of right arm and leg and improvement in speech. Zenyuo at bedside with exam in progress.  Siderails up x 2.  "
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
## 2017-07-14 NOTE — ED NOTES
"Pt presents to ER with c/o numbness. Pt reports waking up at 0030 to pee and felt normal, woke up at 0200 feeling numb and dizziness. Symptoms progressing to right sided weakness, blurred vision, right sided facial droop, and a headache. Pt reports feeling "aches all over". Pt cannot hold right arm up due to weakness. Pt denies taking blood thinners. Also c/o SOB, sats 98% on RA, pt placed on 2 L NC for comfort. Code reach called. Pt in bed,locked, lowest position. Family at bedside. Will continue to monitor.   "

## 2017-07-14 NOTE — ED NOTES
Pt c/o continued headache, rated 6/10.  Called neuro [facility] to remind to placed pain med order.  Spoke to Janiya who states that she will order the med.
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
## 2017-07-14 NOTE — ED NOTES
Pt updated on room cleaning status.  Pt v/u.  Lights dimmed per pt request.  Siderails up x 2/call light in reach.
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
## 2017-07-14 NOTE — ED TRIAGE NOTES
Patient presents to ED as a transfer from [facility] for c/c of CVA. Patient received tPA bolus 8mL starting at 0502, 76 mL tPA infusion starting at 0503 and currently infusing. Patient states that he woke up at 0200 today with dizziness of worsening progression accompanied by photophobia, headache, and generalized weakness prior to arriving to [facility] ED.       Family members at bedside report that patient has been experiencing frequent sycnopal episodes, numbness and tingling to his lips, and muscle aches for the past few days.

## 2017-07-14 NOTE — PROGRESS NOTES
Patient arrived to [facility] from ER by stretcher    Type of Stroke ICA    TPA start time and end time 0503, 0607    Patients current symptoms include headache, R leg drift, r side weakness

## 2017-07-14 NOTE — ED NOTES
"Pt states "take all of this off of me I'm leaving if I can't go out and smoke"  Educated pt on danger of leaving following TPA, Pt states " I don't care".  Encouraged to remain in ER and that he can now order a diet, a higher dose nicotine patch is ordered and his pain medications as well.  Pt agrees to stay "for now".  "

## 2017-07-14 NOTE — ASSESSMENT & PLAN NOTE
Antithrombotics received TPA so may start ASA 81 mg on 7-15-17 at 0900 if no conversion  Statins: Atorvastatin- 40 mg oral daily,   Aggressive risk factor modification: Smoking, High Cholesterol, Diet, Exercise and Obesity,   Rehab Efforts: Physical Therapy, Occupational Therapy, Speech and Language Pathology and Physical Medicine and Rehabilitation,   Diagnostics: Ordered/Pending HgbA1C to assess blood glucose levels, Lipid Profile to assess cholesterol levels, MRI head without contrast to assess brain parenchyma, Trans-thoracic cardiac echo to assess cardiac function/status, TSH to assess thyroid function and VTE Prophylaxis: None: Reason for No Pharmacological VTE Prophylaxis: Mechanical prophylaxis: Place SCDs may begin heparin 5000 units SC Q8H on 7-15-17 at 0900

## 2017-07-14 NOTE — ED NOTES
"Pt sleeping soundly, arouses to verbal stimuli.  Pt reports, " I just have my normal aches and pains".  Family remains at bedside, updated awaiting ICU room assignment.  "

## 2017-07-14 NOTE — ED NOTES
Spoke with neuro critical care team at this time in regards to pt requesting pain medication. Stroke team states they are rounding upstairs and will come evaluate pt shortly. Verbal order received to give tylenol at this time.
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
## 2017-07-15 VITALS
BODY MASS INDEX: 26.35 KG/M2 | SYSTOLIC BLOOD PRESSURE: 135 MMHG | RESPIRATION RATE: 25 BRPM | WEIGHT: 211.88 LBS | HEART RATE: 62 BPM | OXYGEN SATURATION: 96 % | TEMPERATURE: 98 F | HEIGHT: 75 IN | DIASTOLIC BLOOD PRESSURE: 98 MMHG

## 2017-07-15 PROBLEM — I63.312 THROMBOTIC STROKE INVOLVING LEFT MIDDLE CEREBRAL ARTERY: Status: ACTIVE | Noted: 2017-07-14

## 2017-07-15 PROBLEM — G81.91 HEMIPARESIS, RIGHT: Status: RESOLVED | Noted: 2017-07-14 | Resolved: 2017-07-15

## 2017-07-15 PROBLEM — M54.10 RADICULOPATHY AFFECTING UPPER EXTREMITY: Status: ACTIVE | Noted: 2017-07-15

## 2017-07-15 LAB
ALBUMIN SERPL BCP-MCNC: 3 G/DL
ALP SERPL-CCNC: 103 U/L
ALT SERPL W/O P-5'-P-CCNC: 15 U/L
AMPHETAMINES SERPL QL: NEGATIVE
ANION GAP SERPL CALC-SCNC: 8 MMOL/L
APTT BLDCRRT: 23.4 SEC
AST SERPL-CCNC: 12 U/L
BARBITURATES SERPL QL SCN: NEGATIVE
BASOPHILS # BLD AUTO: 0.04 K/UL
BASOPHILS NFR BLD: 0.6 %
BENZODIAZ SERPL QL: POSITIVE
BILIRUB SERPL-MCNC: 0.2 MG/DL
BUN SERPL-MCNC: 8 MG/DL
CALCIUM SERPL-MCNC: 8.3 MG/DL
CANNABINOIDS SERPL QL: NEGATIVE
CHLORIDE SERPL-SCNC: 108 MMOL/L
CK MB SERPL-MCNC: 0.9 NG/ML
CK MB SERPL-RTO: 1.6 %
CK SERPL-CCNC: 57 U/L
CO2 SERPL-SCNC: 24 MMOL/L
COCAINE, BLOOD: NEGATIVE
CREAT SERPL-MCNC: 0.8 MG/DL
DIFFERENTIAL METHOD: NORMAL
EOSINOPHIL # BLD AUTO: 0.3 K/UL
EOSINOPHIL NFR BLD: 4.7 %
ERYTHROCYTE [DISTWIDTH] IN BLOOD BY AUTOMATED COUNT: 12.1 %
EST. GFR  (AFRICAN AMERICAN): >60 ML/MIN/1.73 M^2
EST. GFR  (NON AFRICAN AMERICAN): >60 ML/MIN/1.73 M^2
ETHANOL SERPL-MCNC: NEGATIVE MG/DL
GLUCOSE SERPL-MCNC: 120 MG/DL
HCT VFR BLD AUTO: 41.3 %
HGB BLD-MCNC: 14.3 G/DL
INR PPP: 0.9
LDH SERPL L TO P-CCNC: 163 U/L
LYMPHOCYTES # BLD AUTO: 2.9 K/UL
LYMPHOCYTES NFR BLD: 43.8 %
MAGNESIUM SERPL-MCNC: 1.9 MG/DL
MCH RBC QN AUTO: 29.6 PG
MCHC RBC AUTO-ENTMCNC: 34.6 %
MCV RBC AUTO: 86 FL
METHADONE SERPL QL SCN: NEGATIVE
MONOCYTES # BLD AUTO: 0.4 K/UL
MONOCYTES NFR BLD: 6.6 %
NEUTROPHILS # BLD AUTO: 2.9 K/UL
NEUTROPHILS NFR BLD: 44 %
OPIATES SERPL QL SCN: NEGATIVE
PCP SERPL QL SCN: NEGATIVE
PHOSPHATE SERPL-MCNC: 4 MG/DL
PLATELET # BLD AUTO: 233 K/UL
PMV BLD AUTO: 9.4 FL
POCT GLUCOSE: 120 MG/DL (ref 70–110)
POCT GLUCOSE: 150 MG/DL (ref 70–110)
POTASSIUM SERPL-SCNC: 3.5 MMOL/L
PROPOXYPH SERPL QL: NEGATIVE
PROT SERPL-MCNC: 6.2 G/DL
PROTHROMBIN TIME: 10.1 SEC
RBC # BLD AUTO: 4.83 M/UL
SODIUM SERPL-SCNC: 140 MMOL/L
TROPONIN I SERPL DL<=0.01 NG/ML-MCNC: <0.006 NG/ML
WBC # BLD AUTO: 6.55 K/UL

## 2017-07-15 PROCEDURE — 82553 CREATINE MB FRACTION: CPT

## 2017-07-15 PROCEDURE — 25000003 PHARM REV CODE 250: Performed by: NURSE PRACTITIONER

## 2017-07-15 PROCEDURE — 83615 LACTATE (LD) (LDH) ENZYME: CPT

## 2017-07-15 PROCEDURE — 99233 SBSQ HOSP IP/OBS HIGH 50: CPT | Mod: ,,, | Performed by: PHYSICIAN ASSISTANT

## 2017-07-15 PROCEDURE — 83735 ASSAY OF MAGNESIUM: CPT

## 2017-07-15 PROCEDURE — G8980 MOBILITY D/C STATUS: HCPCS | Mod: CH

## 2017-07-15 PROCEDURE — 85610 PROTHROMBIN TIME: CPT

## 2017-07-15 PROCEDURE — 85025 COMPLETE CBC W/AUTO DIFF WBC: CPT

## 2017-07-15 PROCEDURE — 99233 SBSQ HOSP IP/OBS HIGH 50: CPT | Mod: GC,,, | Performed by: PSYCHIATRY & NEUROLOGY

## 2017-07-15 PROCEDURE — 85730 THROMBOPLASTIN TIME PARTIAL: CPT

## 2017-07-15 PROCEDURE — 83874 ASSAY OF MYOGLOBIN: CPT

## 2017-07-15 PROCEDURE — G8979 MOBILITY GOAL STATUS: HCPCS | Mod: CH

## 2017-07-15 PROCEDURE — 80053 COMPREHEN METABOLIC PANEL: CPT

## 2017-07-15 PROCEDURE — 84484 ASSAY OF TROPONIN QUANT: CPT

## 2017-07-15 PROCEDURE — 84100 ASSAY OF PHOSPHORUS: CPT

## 2017-07-15 PROCEDURE — 63600175 PHARM REV CODE 636 W HCPCS: Performed by: PHYSICIAN ASSISTANT

## 2017-07-15 PROCEDURE — 25000003 PHARM REV CODE 250: Performed by: PHYSICIAN ASSISTANT

## 2017-07-15 PROCEDURE — 97116 GAIT TRAINING THERAPY: CPT

## 2017-07-15 PROCEDURE — 93005 ELECTROCARDIOGRAM TRACING: CPT

## 2017-07-15 RX ORDER — ASPIRIN 325 MG
325 TABLET ORAL DAILY
Status: DISCONTINUED | OUTPATIENT
Start: 2017-07-15 | End: 2017-07-15

## 2017-07-15 RX ORDER — NAPROXEN SODIUM 220 MG/1
81 TABLET, FILM COATED ORAL DAILY
Status: DISCONTINUED | OUTPATIENT
Start: 2017-07-15 | End: 2017-07-15

## 2017-07-15 RX ORDER — ROSUVASTATIN CALCIUM 20 MG/1
40 TABLET, COATED ORAL DAILY
Status: DISCONTINUED | OUTPATIENT
Start: 2017-07-16 | End: 2017-07-15 | Stop reason: HOSPADM

## 2017-07-15 RX ORDER — ROSUVASTATIN CALCIUM 40 MG/1
40 TABLET, COATED ORAL DAILY
Qty: 30 TABLET | Refills: 1 | Status: SHIPPED | OUTPATIENT
Start: 2017-07-16 | End: 2020-12-23

## 2017-07-15 RX ORDER — FENTANYL CITRATE 50 UG/ML
50 INJECTION, SOLUTION INTRAMUSCULAR; INTRAVENOUS ONCE
Status: COMPLETED | OUTPATIENT
Start: 2017-07-15 | End: 2017-07-15

## 2017-07-15 RX ORDER — ASPIRIN 325 MG
325 TABLET ORAL DAILY
Refills: 0 | COMMUNITY
Start: 2017-07-15 | End: 2021-05-25

## 2017-07-15 RX ORDER — MIDAZOLAM HYDROCHLORIDE 1 MG/ML
2 INJECTION, SOLUTION INTRAMUSCULAR; INTRAVENOUS
Status: COMPLETED | OUTPATIENT
Start: 2017-07-15 | End: 2017-07-15

## 2017-07-15 RX ORDER — ASPIRIN 325 MG
325 TABLET ORAL DAILY
Status: DISCONTINUED | OUTPATIENT
Start: 2017-07-15 | End: 2017-07-15 | Stop reason: HOSPADM

## 2017-07-15 RX ORDER — HEPARIN SODIUM 5000 [USP'U]/ML
5000 INJECTION, SOLUTION INTRAVENOUS; SUBCUTANEOUS EVERY 8 HOURS
Status: DISCONTINUED | OUTPATIENT
Start: 2017-07-15 | End: 2017-07-15 | Stop reason: HOSPADM

## 2017-07-15 RX ADMIN — NICOTINE 1 PATCH: 21 PATCH, EXTENDED RELEASE TRANSDERMAL at 08:07

## 2017-07-15 RX ADMIN — MIDAZOLAM HYDROCHLORIDE 2 MG: 1 INJECTION, SOLUTION INTRAMUSCULAR; INTRAVENOUS at 04:07

## 2017-07-15 RX ADMIN — ASPIRIN 81 MG CHEWABLE TABLET 81 MG: 81 TABLET CHEWABLE at 11:07

## 2017-07-15 RX ADMIN — Medication 3 ML: at 01:07

## 2017-07-15 RX ADMIN — ACETAMINOPHEN 650 MG: 325 TABLET ORAL at 12:07

## 2017-07-15 RX ADMIN — GABAPENTIN 600 MG: 300 CAPSULE ORAL at 08:07

## 2017-07-15 RX ADMIN — GABAPENTIN 600 MG: 300 CAPSULE ORAL at 11:07

## 2017-07-15 RX ADMIN — OXYCODONE HYDROCHLORIDE 15 MG: 5 TABLET ORAL at 01:07

## 2017-07-15 RX ADMIN — STANDARDIZED SENNA CONCENTRATE AND DOCUSATE SODIUM 1 TABLET: 8.6; 5 TABLET, FILM COATED ORAL at 08:07

## 2017-07-15 RX ADMIN — FENTANYL CITRATE 50 MCG: 50 INJECTION, SOLUTION INTRAMUSCULAR; INTRAVENOUS at 02:07

## 2017-07-15 RX ADMIN — Medication 3 ML: at 06:07

## 2017-07-15 RX ADMIN — OXYCODONE HYDROCHLORIDE 15 MG: 5 TABLET ORAL at 05:07

## 2017-07-15 RX ADMIN — HEPARIN SODIUM 5000 UNITS: 5000 INJECTION, SOLUTION INTRAVENOUS; SUBCUTANEOUS at 11:07

## 2017-07-15 RX ADMIN — ROSUVASTATIN CALCIUM 20 MG: 20 TABLET, FILM COATED ORAL at 08:07

## 2017-07-15 NOTE — SUBJECTIVE & OBJECTIVE
NIH Stroke Scale:    Level of Consciousness: 0 - alert  LOC Questions: 0 - answers both correctly  LOC Commands: 0 - performs both correctly  Best Gaze: 0 - normal  Visual: 0 - no visual loss  Facial Palsy: 0 - normal  Motor Left Arm: 0 - no drift  Motor Right Arm: 1 - drift (baseline)  Motor Left Le - no drift  Motor Right Le - no drift  Limb Ataxia: 0 - absent  Sensory: 1 - mild to moderate loss (baseline in RUE)  Best Language: 0 - no aphasia  Dysarthria: 0 - normal articulation  Extinction and Inattention: 0 - no neglect  NIH Stroke Scale Total: 2  [place] Coma Scale:  Best Eye Response: 4 - spontaneous  Best Motor Response: 6 - obeys commands  Best Verbal Response: 5 - oriented  [place] Coma Scale Total: 15  Modified [place] Scale:   Timeline: Prior to symptoms onset  Modified Kelly Score: 1 - no significant disability

## 2017-07-15 NOTE — ASSESSMENT & PLAN NOTE
-CTA negative   -MRI negative  -s/p tpa, out of tpa window  -back to baseline per pt  -TTF vascular neurology/dc home per vascular neurology   -

## 2017-07-15 NOTE — PLAN OF CARE
07/15/2017      Michael Alonzo  [address]  [place] [address]          Vascular Neurology Dept.  [facility] [address] [place] [address] [phone] after hours   Diagnosis:  Thrombotic stroke involving left middle cerebral artery                          Please evaluate and treat patient for his physical therapy and occupational therapy needs.          __________________________  Teto Roberts MD  07/15/2017

## 2017-07-15 NOTE — ASSESSMENT & PLAN NOTE
Michael YAN Clinton is a 42 y.o. male with tPA aborted and MRI negative stroke    Antiplatelets: asa 325  Statins: rosuvastatin 40  SBP <180  DVT ppx: sq heparin  PT/OT and speech to evaluate and treat  Neuro checks q1h

## 2017-07-15 NOTE — PROGRESS NOTES
Informed NCC team and spoke to Aleta HEADLEY that the  patient doesn't want to be transferred to another room. Patient wants to go home. Stroke team notified and spoke to Samantha. Will continue to monitor and await for new orders.
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
## 2017-07-15 NOTE — PLAN OF CARE
Problem: Physical Therapy Goal  Goal: Physical Therapy Goal  Goals to be met by: 17     Patient will increase functional independence with mobility by performin. Supine to sit with [place] MET  2. Sit to supine with [place] MET  3. Gait  x 50 feet with [place] with or without using Single-point Cane. MET  4. Ascend/descend 4 stair with right Handrails Supervision.      Outcome: Outcome(s) achieved Date Met: 07/15/17        Pt met all goals this visit by demonstrating adequate strength, balance, and safety with mobility. Pt is back at baseline requiring no further skilled acute PT services. D/C PT this date; note to follow.    Stephany Lagunas, PT, DPT  07/15/2017

## 2017-07-15 NOTE — HOSPITAL COURSE
7/14: admitted to [facility] s/p tpa for presumed L MCA infarct  7/15: MRI clean, past tpa window, no evidence of infarct, TTF vascular neurology/dc home

## 2017-07-15 NOTE — PLAN OF CARE
Problem: Patient Care Overview  Goal: Plan of Care Review  Outcome: Ongoing (interventions implemented as appropriate)  POC reviewed with pt at 0500. Pt verbalized understanding. Questions and concerns addressed. No acute events overnight. MRI obtained tonight. tPA window now over. Pt progressing toward goals. Will continue to monitor. See flowsheets for full assessment and VS info
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
## 2017-07-15 NOTE — PLAN OF CARE
Problem: Patient Care Overview  Goal: Plan of Care Review  Outcome: Ongoing (interventions implemented as appropriate)  Plan of care reviewed with patient. Patient verbalized understanding of the plan of care. Patient was seen by PT today. Still with complaints of head pain and Right arm pain and weakness. Patient progressing towards goals of care. Please see flow sheet for detailed nursing assessment and VS. Will continue to monitor.
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
## 2017-07-15 NOTE — PROGRESS NOTES
Patient discharged from [facility] via wheelchair. AVS printed and discussed thoroughly with patient. All questions, concerns addressed. Driven home by wife. Paper work given. AVS given.

## 2017-07-15 NOTE — PLAN OF CARE
07/15/2017      Michael Alonzo  [address] [place] [address]          Vascular Neurology Dept.  [facility] [address] [place] [address] [phone] after hours   Diagnosis:  Thrombotic stroke involving left middle cerebral artery          Mr. Michael Alonzo required recent hospitalization from 07/14/17-07/15/17 and therefore was unable to make his recent doctor appointment. If there are medications that are currently prescribed by your clinic for his pain management, please provide refills at your discretion.        __________________________  Samantha Johnson PA-C  07/15/2017

## 2017-07-15 NOTE — PT/OT/SLP PROGRESS
"Physical Therapy  Treatment/Discharge Summary    Michael Alonzo   MRN: [id number]   Admitting Diagnosis: Embolic stroke involving left middle cerebral artery    PT Received On: 07/15/17  PT Start Time: 1138     PT Stop Time: 1148    PT Total Time (min): 10 min       Billable Minutes:  Gait [id number]    Treatment Type: Treatment  PT/PTA: PT             General Precautions: Standard, fall  Orthopedic Precautions: N/A   Braces: N/A    Do you have any cultural, spiritual, Jain conflicts, given your current situation?: none stated    Subjective:  Communicated with RN prior to session.  Pt agreeable to PT session.     "I'm ready to go home."     Pain/Comfort  Pain Rating 1:  (no rating stated)  Location 1:  (back; L leg)  Pain Rating Post-Intervention 1:  (no rating stated)    Objective:   Patient found with: blood pressure cuff, telemetry, pulse ox (continuous), peripheral IV    Functional Mobility:  Bed Mobility:  Supine to Sit:  Modified Independent HOB slightly elevated  Sit to supine: Activity did not occur pt remained up in chair with family present  Scooting: Independent       Transfers:   Sit to stand:Independent with No Assistive Device from EOB      Gait:   Patient ambulated ~80 feet with No Assistive Device with Contact Guard Assistance.  Pt demonstrated step-to gait with R LE limp, decrease swing phase on R, but appropriate foot clearance.      Stairs:   Unable to assess due to ICU monitoring and R LE pain. Pt demonstrated appropriate balance, coordination, and strength to complete home steps safely with family assist as needed.       Balance:  Static Sitting: Independent   Dynamic Sitting: Independent   Static Standing: Independent   Dynamic Standing: Independent       Education:  Education provided to pt and family regarding: safety with mobility. Verbalized understanding.     Whiteboard updated with correct mobility information. RN/PCT notified.  Pt ok to walk to bathroom with RN permission. " Supervision for lines.       AM-PAC 6 CLICK MOBILITY  How much help from another person does this patient currently need?   1 = Unable, Total/Dependent Assistance  2 = A lot, Maximum/Moderate Assistance  3 = A little, Minimum/Contact Guard/Supervision  4 = None, Modified [place]/Independent    Turning over in bed (including adjusting bedclothes, sheets and blankets)?: 4  Sitting down on and standing up from a chair with arms (e.g., wheelchair, bedside commode, etc.): 4  Moving from lying on back to sitting on the side of the bed?: 4  Moving to and from a bed to a chair (including a wheelchair)?: 4  Need to walk in hospital room?: 4  Climbing 3-5 steps with a railing?: 4  Total Score: 24    AM-PAC Raw Score CMS G-Code Modifier Level of Impairment Assistance   6 % Total / Unable   7 - 9 CM 80 - 100% Maximal Assist   10 - 14 CL 60 - 80% Moderate Assist   15 - 19 CK 40 - 60% Moderate Assist   20 - 22 CJ 20 - 40% Minimal Assist   23 CI 1-20% SBA / CGA   24 CH 0% Independent/ Mod I     Patient left up in chair with all lines intact and call button in reach.    Assessment:  Michael Alonzo is a 42 y.o. male with a medical diagnosis of Embolic stroke involving left middle cerebral artery and presents with decrease endurance and R LE pain impairing pt's overall gait and coordination. Pt tolerated session well and continues to progress towards goals.  Pt reports having back and LE pain issues prior to admission. He demonstrated near baseline mobility with no safety concerns. Pt no longer demonstrates a need for acute PT services. At this time, pt will be d/c from acute PT. Please re-consult should pt's functional status change. Recommend d/c to home with OP OT and PT for further therapeutic intervention to increase overall safety and assist pt to returning to PLOF and activities.      Rehab identified problem list/impairments: Rehab identified problem list/impairments: impaired endurance, pain, weakness, gait  instability    Rehab potential is good.    Activity tolerance: Good    Discharge recommendations: Discharge Facility/Level Of Care Needs: home; OP PT/OT    Barriers to discharge: Barriers to Discharge: None    Equipment recommendations: Equipment Needed After Discharge: cane, straight     GOALS:    Physical Therapy Goals     Not on file          Multidisciplinary Problems (Resolved)        Problem: Physical Therapy Goal    Goal Priority Disciplines Outcome Goal Variances Interventions   Physical Therapy Goal   (Resolved)     PT/OT, PT Outcome(s) achieved     Description:  Goals to be met by: 17     Patient will increase functional independence with mobility by performin. Supine to sit with [place] MET  2. Sit to supine with [place] MET  3. Gait  x 50 feet with [place] with or without using Single-point Cane. MET  4. Ascend/descend 4 stair with right Handrails Supervision.                        PLAN:  D/c acute PT services at this time. Pt is supervision/mod (I) with all mobility.     Plan of Care reviewed with: patient, spouse    PT G-Codes  Functional Assessment Tool Used: ampac  Score: 24  Functional Limitation: Mobility: Walking and moving around  Mobility: Walking and Moving Around Goal Status (): EYAL  Mobility: Walking and Moving Around Discharge Status (): EYAL [place], PT  07/15/2017

## 2017-07-15 NOTE — ASSESSMENT & PLAN NOTE
Michael Alonzo is a 42 y.o. male with tPA aborted and MRI negative stroke. Plans for discharge home with outpatient PT and OT    Antiplatelets: asa 325  Statins: rosuvastatin 40  SBP <180  DVT ppx: sq heparin  PT/OT and speech to evaluate and treat  Neuro checks q1h

## 2017-07-16 LAB — POCT GLUCOSE: 109 MG/DL (ref 70–110)

## 2017-07-17 ENCOUNTER — TELEPHONE (OUTPATIENT)
Dept: NEUROSURGERY | Facility: HOSPITAL | Age: 42
End: 2017-07-17

## 2017-07-17 LAB — MYOGLOBIN SERPL-MCNC: 26 MCG/L

## 2017-07-17 NOTE — TELEPHONE ENCOUNTER
"Called number on file.  Spoke with patient.  Risk factors specific to patient for stroke discussed with teach back implemented.  Patient verbalized understanding of discharge instructions and medications.  Patient was asked about discharge appointments and follow up care.  No follow appointment scheduled thus far.  Message sent to [facility] on patient's behalf to schedule follow up appointment. Warning signs discussed with teach back discussion method implemented.  Patient stated "I have been feeling really hot since I left the hospital and I've had shortness of breath and a little chest pain."  Patient also stated "I have a follow up with my doctor in [place] on the 20th"  Instructed patient if any shortness of breath, trouble breathing, or radiating chest to contact 911.  Notified to seek immediate medical help via 911 if new or worsening stroke symptoms occur.  Patient relayed no new questions or comments at this time.  Instructed to call [facility] with any further questions.  "

## 2017-07-23 PROBLEM — M54.10 RADICULOPATHY AFFECTING UPPER EXTREMITY: Status: ACTIVE | Noted: 2017-07-23

## 2017-07-26 ENCOUNTER — HOSPITAL ENCOUNTER (INPATIENT)
Facility: HOSPITAL | Age: 42
LOS: 1 days | Discharge: HOME | DRG: 887 | End: 2017-07-27
Attending: PSYCHIATRY & NEUROLOGY | Admitting: PSYCHIATRY & NEUROLOGY
Payer: MEDICARE

## 2017-07-26 ENCOUNTER — HOSPITAL ENCOUNTER (EMERGENCY)
Facility: HOSPITAL | Age: 42
Discharge: ANOTHER HOSPITAL | End: 2017-07-26
Attending: EMERGENCY MEDICINE
Payer: MEDICARE

## 2017-07-26 VITALS
OXYGEN SATURATION: 96 % | HEART RATE: 64 BPM | TEMPERATURE: 98 F | BODY MASS INDEX: 29.84 KG/M2 | SYSTOLIC BLOOD PRESSURE: 109 MMHG | HEIGHT: 75 IN | WEIGHT: 240 LBS | DIASTOLIC BLOOD PRESSURE: 52 MMHG | RESPIRATION RATE: 17 BRPM

## 2017-07-26 DIAGNOSIS — I63.9 CEREBROVASCULAR ACCIDENT (CVA), UNSPECIFIED MECHANISM: Primary | ICD-10-CM

## 2017-07-26 DIAGNOSIS — E78.2 MIXED HYPERLIPIDEMIA: ICD-10-CM

## 2017-07-26 DIAGNOSIS — F41.9 ANXIETY: Primary | ICD-10-CM

## 2017-07-26 DIAGNOSIS — F17.200 CURRENT EVERY DAY SMOKER: ICD-10-CM

## 2017-07-26 DIAGNOSIS — I63.9 STROKE: ICD-10-CM

## 2017-07-26 DIAGNOSIS — I63.9 CEREBROVASCULAR ACCIDENT (CVA), UNSPECIFIED MECHANISM: ICD-10-CM

## 2017-07-26 DIAGNOSIS — R53.1 RIGHT SIDED WEAKNESS: ICD-10-CM

## 2017-07-26 DIAGNOSIS — F44.9 CONVERSION DISORDER: ICD-10-CM

## 2017-07-26 DIAGNOSIS — I63.9 CVA (CEREBRAL VASCULAR ACCIDENT): ICD-10-CM

## 2017-07-26 LAB
ALBUMIN SERPL BCP-MCNC: 3.7 G/DL
ALP SERPL-CCNC: 89 U/L
ALT SERPL W/O P-5'-P-CCNC: 43 U/L
ANION GAP SERPL CALC-SCNC: 11 MMOL/L
AST SERPL-CCNC: 28 U/L
BASOPHILS # BLD AUTO: 0 K/UL
BASOPHILS NFR BLD: 0.6 %
BILIRUB SERPL-MCNC: 0.4 MG/DL
BUN SERPL-MCNC: 13 MG/DL
CALCIUM SERPL-MCNC: 9.3 MG/DL
CHLORIDE SERPL-SCNC: 103 MMOL/L
CHOLEST/HDLC SERPL: 4.7 {RATIO}
CO2 SERPL-SCNC: 24 MMOL/L
CREAT SERPL-MCNC: 1.1 MG/DL
DIFFERENTIAL METHOD: ABNORMAL
EOSINOPHIL # BLD AUTO: 0.2 K/UL
EOSINOPHIL NFR BLD: 3.2 %
ERYTHROCYTE [DISTWIDTH] IN BLOOD BY AUTOMATED COUNT: 12.5 %
EST. GFR  (AFRICAN AMERICAN): >60 ML/MIN/1.73 M^2
EST. GFR  (NON AFRICAN AMERICAN): >60 ML/MIN/1.73 M^2
GLUCOSE SERPL-MCNC: 96 MG/DL
HCT VFR BLD AUTO: 41.5 %
HDL/CHOLESTEROL RATIO: 21.3 %
HDLC SERPL-MCNC: 164 MG/DL
HDLC SERPL-MCNC: 35 MG/DL
HGB BLD-MCNC: 14.4 G/DL
INR PPP: 1
LDLC SERPL CALC-MCNC: 104.8 MG/DL
LYMPHOCYTES # BLD AUTO: 2.9 K/UL
LYMPHOCYTES NFR BLD: 38.8 %
MCH RBC QN AUTO: 29.7 PG
MCHC RBC AUTO-ENTMCNC: 34.6 G/DL
MCV RBC AUTO: 86 FL
MONOCYTES # BLD AUTO: 0.7 K/UL
MONOCYTES NFR BLD: 9.3 %
NEUTROPHILS # BLD AUTO: 3.6 K/UL
NEUTROPHILS NFR BLD: 48.1 %
NONHDLC SERPL-MCNC: 129 MG/DL
PLATELET # BLD AUTO: 283 K/UL
PMV BLD AUTO: 7.9 FL
POTASSIUM SERPL-SCNC: 3.9 MMOL/L
PROT SERPL-MCNC: 7.1 G/DL
PROTHROMBIN TIME: 10.6 SEC
RBC # BLD AUTO: 4.84 M/UL
SODIUM SERPL-SCNC: 138 MMOL/L
T4 FREE SERPL-MCNC: 1.02 NG/DL
TRIGL SERPL-MCNC: 121 MG/DL
TSH SERPL DL<=0.005 MIU/L-ACNC: 7.33 UIU/ML
WBC # BLD AUTO: 7.4 K/UL

## 2017-07-26 PROCEDURE — 84439 ASSAY OF FREE THYROXINE: CPT

## 2017-07-26 PROCEDURE — 99291 CRITICAL CARE FIRST HOUR: CPT | Mod: 25

## 2017-07-26 PROCEDURE — 85025 COMPLETE CBC W/AUTO DIFF WBC: CPT

## 2017-07-26 PROCEDURE — 82962 GLUCOSE BLOOD TEST: CPT

## 2017-07-26 PROCEDURE — 93005 ELECTROCARDIOGRAM TRACING: CPT

## 2017-07-26 PROCEDURE — A4216 STERILE WATER/SALINE, 10 ML: HCPCS | Performed by: PHYSICIAN ASSISTANT

## 2017-07-26 PROCEDURE — 63600175 PHARM REV CODE 636 W HCPCS: Performed by: EMERGENCY MEDICINE

## 2017-07-26 PROCEDURE — 96374 THER/PROPH/DIAG INJ IV PUSH: CPT

## 2017-07-26 PROCEDURE — 63600175 PHARM REV CODE 636 W HCPCS: Performed by: PHYSICIAN ASSISTANT

## 2017-07-26 PROCEDURE — 20600001 HC STEP DOWN PRIVATE ROOM

## 2017-07-26 PROCEDURE — S4991 NICOTINE PATCH NONLEGEND: HCPCS | Performed by: EMERGENCY MEDICINE

## 2017-07-26 PROCEDURE — 36415 COLL VENOUS BLD VENIPUNCTURE: CPT

## 2017-07-26 PROCEDURE — 96376 TX/PRO/DX INJ SAME DRUG ADON: CPT

## 2017-07-26 PROCEDURE — 84443 ASSAY THYROID STIM HORMONE: CPT

## 2017-07-26 PROCEDURE — 80061 LIPID PANEL: CPT

## 2017-07-26 PROCEDURE — 80053 COMPREHEN METABOLIC PANEL: CPT

## 2017-07-26 PROCEDURE — 25000003 PHARM REV CODE 250: Performed by: EMERGENCY MEDICINE

## 2017-07-26 PROCEDURE — 25000003 PHARM REV CODE 250: Performed by: PHYSICIAN ASSISTANT

## 2017-07-26 PROCEDURE — G0426 INPT/ED TELECONSULT50: HCPCS | Mod: GT,,, | Performed by: PSYCHIATRY & NEUROLOGY

## 2017-07-26 PROCEDURE — 99223 1ST HOSP IP/OBS HIGH 75: CPT | Mod: ,,, | Performed by: PHYSICIAN ASSISTANT

## 2017-07-26 PROCEDURE — 85610 PROTHROMBIN TIME: CPT

## 2017-07-26 RX ORDER — LABETALOL HYDROCHLORIDE 5 MG/ML
10 INJECTION, SOLUTION INTRAVENOUS
Status: DISCONTINUED | OUTPATIENT
Start: 2017-07-26 | End: 2017-07-27 | Stop reason: HOSPADM

## 2017-07-26 RX ORDER — NAPROXEN SODIUM 220 MG/1
81 TABLET, FILM COATED ORAL DAILY
Status: DISCONTINUED | OUTPATIENT
Start: 2017-07-27 | End: 2017-07-26

## 2017-07-26 RX ORDER — OXYCODONE HYDROCHLORIDE 5 MG/1
15 TABLET ORAL EVERY 8 HOURS
Status: DISCONTINUED | OUTPATIENT
Start: 2017-07-26 | End: 2017-07-27

## 2017-07-26 RX ORDER — MORPHINE SULFATE 4 MG/ML
8 INJECTION, SOLUTION INTRAMUSCULAR; INTRAVENOUS
Status: COMPLETED | OUTPATIENT
Start: 2017-07-26 | End: 2017-07-26

## 2017-07-26 RX ORDER — ATORVASTATIN CALCIUM 20 MG/1
40 TABLET, FILM COATED ORAL DAILY
Status: DISCONTINUED | OUTPATIENT
Start: 2017-07-27 | End: 2017-07-26

## 2017-07-26 RX ORDER — IBUPROFEN 200 MG
1 TABLET ORAL
Status: DISCONTINUED | OUTPATIENT
Start: 2017-07-26 | End: 2017-07-26 | Stop reason: HOSPADM

## 2017-07-26 RX ORDER — ROSUVASTATIN CALCIUM 10 MG/1
40 TABLET, COATED ORAL NIGHTLY
Status: DISCONTINUED | OUTPATIENT
Start: 2017-07-26 | End: 2017-07-27 | Stop reason: HOSPADM

## 2017-07-26 RX ORDER — HEPARIN SODIUM 5000 [USP'U]/ML
5000 INJECTION, SOLUTION INTRAVENOUS; SUBCUTANEOUS EVERY 8 HOURS
Status: DISCONTINUED | OUTPATIENT
Start: 2017-07-26 | End: 2017-07-27 | Stop reason: HOSPADM

## 2017-07-26 RX ORDER — IBUPROFEN 200 MG
1 TABLET ORAL DAILY
Status: DISCONTINUED | OUTPATIENT
Start: 2017-07-27 | End: 2017-07-27 | Stop reason: HOSPADM

## 2017-07-26 RX ORDER — LEVOTHYROXINE SODIUM 25 UG/1
25 TABLET ORAL
Status: DISCONTINUED | OUTPATIENT
Start: 2017-07-27 | End: 2017-07-27 | Stop reason: HOSPADM

## 2017-07-26 RX ORDER — MIDAZOLAM HYDROCHLORIDE 1 MG/ML
1 INJECTION INTRAMUSCULAR; INTRAVENOUS ONCE AS NEEDED
Status: ACTIVE | OUTPATIENT
Start: 2017-07-26 | End: 2017-07-26

## 2017-07-26 RX ORDER — SODIUM CHLORIDE 0.9 % (FLUSH) 0.9 %
3 SYRINGE (ML) INJECTION EVERY 8 HOURS
Status: DISCONTINUED | OUTPATIENT
Start: 2017-07-26 | End: 2017-07-27 | Stop reason: HOSPADM

## 2017-07-26 RX ORDER — ASPIRIN 325 MG
325 TABLET ORAL DAILY
Status: DISCONTINUED | OUTPATIENT
Start: 2017-07-27 | End: 2017-07-27 | Stop reason: HOSPADM

## 2017-07-26 RX ADMIN — ROSUVASTATIN CALCIUM 40 MG: 10 TABLET ORAL at 10:07

## 2017-07-26 RX ADMIN — MORPHINE SULFATE 8 MG: 4 INJECTION INTRAVENOUS at 10:07

## 2017-07-26 RX ADMIN — HEPARIN SODIUM 5000 UNITS: 5000 INJECTION, SOLUTION INTRAVENOUS; SUBCUTANEOUS at 10:07

## 2017-07-26 RX ADMIN — Medication 3 ML: at 10:07

## 2017-07-26 RX ADMIN — MORPHINE SULFATE 8 MG: 4 INJECTION INTRAVENOUS at 04:07

## 2017-07-26 RX ADMIN — NICOTINE 1 PATCH: 21 PATCH TRANSDERMAL at 05:07

## 2017-07-26 RX ADMIN — OXYCODONE HYDROCHLORIDE 15 MG: 5 TABLET ORAL at 10:07

## 2017-07-26 NOTE — CONSULTS
[facility]  Tele-Consultation Note    Consultation started: 7/26/2017 at 0645 AM   Consulting Provider:    The patient location is [facility]  Spoke hospital nurse at bedside with patient assisting consultant.     Subjective:   Subjective   History of Present Illness:  Michael Alonzo is a 42 y.o. male who presents with dysarthria and right hemiparesis. Normal upon retiring last evening at 2200. Awoke at 0430 to toilet and had these symptoms.     Wake up Stroke?: yes  Last known normal:  7/25/17 at 2200  Recent bleeding noted: no  Does the patient take any Blood Thinners? no           H&P was obtained from patient and spouse/SO.   Past Medical History: hyperlipidemia, stroke and hypothyroidism; chronic LBP s/p 2 previous back surgeries    Past Surgical History: no surgeries within the last 3 months    Family History: stroke    Social History: smoker (active)    Medications: No current facility-administered medications for this encounter.     Current Outpatient Prescriptions:     aspirin 325 MG tablet, Take 1 tablet (325 mg total) by mouth once daily., Disp: , Rfl: 0    diazePAM (VALIUM) 10 MG Tab, Take 10 mg by mouth nightly as needed., Disp: , Rfl:     gabapentin (NEURONTIN) 600 MG tablet, Take 600 mg by mouth 3 (three) times daily. 1 tablet QAM, 1 tablet at noon, 2 tablets QPM, and 2 tablets QHS, Disp: , Rfl:     LEVOTHYROXINE SODIUM (SYNTHROID ORAL), Take 25 mcg by mouth once daily at 6am. , Disp: , Rfl:     oxycodone (ROXICODONE) 15 MG Tab, Take 15 mg by mouth 3 (three) times daily., Disp: , Rfl:     rosuvastatin (CRESTOR) 40 MG Tab, Take 1 tablet (40 mg total) by mouth once daily., Disp: 30 tablet, Rfl: 1    Allergies: Tramadol, Darvocet, decadron    Review Of Systems:     Review of Systems   HENT: Negative.    Eyes: Negative.    Respiratory: Negative.    Cardiovascular: Negative.    Genitourinary: Negative.    Musculoskeletal:  Positive for arthralgias and back pain.   Neurological: Positive for facial asymmetry, speech difficulty, weakness and numbness.   Hematological: Negative.        Objective:     Vitals:   Vitals:    17 0640   BP: 119/77   Pulse: 84   Resp: 16   Temp: 97.2 °F (36.2 °C)    BP: 137/72    Objective   Physical Exam:  Physical Exam   Constitutional: He appears well-developed and well-nourished.   HENT:   Head: Normocephalic and atraumatic.   Eyes: Conjunctivae and EOM are normal. Pupils are equal, round, and reactive to light.   Neck: Normal range of motion.   Cardiovascular: Normal rate and regular rhythm.    Pulmonary/Chest: Effort normal.   Musculoskeletal: Normal range of motion.   Neurological: He is alert. GCS eye subscore is 4 - spontaneous. GCS verbal subscore is 5 - oriented. GCS motor subscore is 6 - obeys commands.          Imaging Notes: No hemmorhage. No mass effect. No early infarct signs. Impression performed at: 0710    NIH Stroke Scale:  Interval: baseline (upon arrival/admit)  Level of Consciousness: 0 - alert  LOC Questions: 0 - answers both correctly  LOC Commands: 0 - performs both correctly  Best Gaze: 0 - normal  Visual: 1 - partial hemianopia  Facial Palsy: 1 - minor  Motor Left Arm: 0 - no drift  Motor Right Arm: 2 - can't resist gravity  Motor Left Le - no drift  Motor Right Le - can't resist gravity  Limb Ataxia: 0 - absent  Sensory: 1 - mild to moderate loss  Best Language: 0 - no aphasia  Dysarthria: 1 - mild to moderate dysarthria  Extinction and Inattention: 0 - no neglect  NIH Stroke Scale Total: 8  [place] Coma Scale:  Best Eye Response: 4 - spontaneous  Best Motor Response: 6 - obeys commands  Best Verbal Response: 5 - oriented  [place] Coma Scale Total: 15  Modified Swain Scale:   Timeline: Prior to symptoms onset  Modified Swain Score: 1 - no significant disability    ABCD2 Scale for TIA:   Age > or = 60: 0 - no  B/P or = 140/9 at Initial Evaluation: 0 - no  Clinical  Features of TIA: 2 - unilateral weakness  Duration of Symptoms: 2 - > or equal to 60 minutes  Diabetes Mellitus in History: 0 - no  ABCD2 Scale Total: 4  [id number] Scale:   Age: 0 - < 65 years old  CHF History: 0 - no  HTN History: 0 - no  Stroke/TIA/Thromboembolism History: 2 - yes  Vascular Disease History: 0 - no  Diabetes Mellitus in History: 0 - no  Female: 0 - no  [id number] Scale Total: 2        Assessment - Diagnosis - Goals:     Plan     Diagnosis/Impression: Other large vessel stenosis or occlusion with stroke (I63.09)  LMCA stroke vs Conversion    Alteplase Recommendation: Altaplase not recommended due to Outside of treament window    Recommendation: NPO until after pass dysphagia screen. Diagnostic studies: CTA Head to assess vasculature , CTA Neck/Arch to assess vasculature, Lipid Profile to assess cholesterol levels, MRI head without contrast to assess brain parenchyma, TSH to assess thyroid function  Consults: Rehab Consult; Occupational Therapy, Rehab Consult; Physical Therapy, Rehab Consult; Speech Therapy,  and Stroke Team  Antithrombotics: Aspirin: 325 mg oral daily  Statins: Rosuvastatin- 20 mg oral daily    Disposition Recommendation:  transfer to [facility] by  ground  standard       Possible Interventional Revascularization Candidate? Yes    Recommended the emergency room physician to have a brief discussion with the patient and/or family if available regarding the risks and benefits of treatment, and to briefly document the occurrence of that discussion in his clinical encounter note.     The attending portion of this evaluation, treatment, and documentation was performed per Liz Jaime via audiovisual.      Billing code:  (moderate to severe stroke, large areas of edema, some mimics)    · This patient has a critical neurological condition/illness, with high morbidity and mortality.  · There is a high probability for acute neurological change leading  to clinical and possibly life-threatening deterioration requiring highest level of physician preparedness for urgent intervention.  · Care was coordinated with other physicians involved in the patient's care.  · Radiologic studies and laboratory data were reviewed and interpreted, and plan of care was re-assessed based on the results.  · Diagnosis, treatment options and prognosis may have been discussed with the patient and/or family members or caregiver.  · Further advanced medical management and further evaluation is warranted for his care.        Consultation ended: 7/26/2017 at 0712

## 2017-07-27 VITALS
TEMPERATURE: 98 F | OXYGEN SATURATION: 98 % | SYSTOLIC BLOOD PRESSURE: 140 MMHG | RESPIRATION RATE: 18 BRPM | HEIGHT: 75 IN | HEART RATE: 66 BPM | DIASTOLIC BLOOD PRESSURE: 79 MMHG | BODY MASS INDEX: 29.88 KG/M2 | WEIGHT: 240.31 LBS

## 2017-07-27 PROBLEM — R53.1 RIGHT SIDED WEAKNESS: Status: ACTIVE | Noted: 2017-07-26

## 2017-07-27 PROBLEM — F41.9 ANXIETY: Status: ACTIVE | Noted: 2017-07-27

## 2017-07-27 LAB
ALBUMIN SERPL BCP-MCNC: 3.5 G/DL
ALP SERPL-CCNC: 101 U/L
ALT SERPL W/O P-5'-P-CCNC: 39 U/L
ANION GAP SERPL CALC-SCNC: 11 MMOL/L
APTT BLDCRRT: 24.2 SEC
AST SERPL-CCNC: 25 U/L
BASOPHILS # BLD AUTO: 0.05 K/UL
BASOPHILS NFR BLD: 0.7 %
BILIRUB SERPL-MCNC: 0.3 MG/DL
BILIRUB UR QL STRIP: NEGATIVE
BUN SERPL-MCNC: 13 MG/DL
CALCIUM SERPL-MCNC: 8.8 MG/DL
CHLORIDE SERPL-SCNC: 104 MMOL/L
CK MB SERPL-MCNC: 5.3 NG/ML
CK MB SERPL-RTO: 2.1 %
CK SERPL-CCNC: 247 U/L
CLARITY UR REFRACT.AUTO: CLEAR
CO2 SERPL-SCNC: 25 MMOL/L
COLOR UR AUTO: YELLOW
CREAT SERPL-MCNC: 0.9 MG/DL
DIFFERENTIAL METHOD: NORMAL
EOSINOPHIL # BLD AUTO: 0.3 K/UL
EOSINOPHIL NFR BLD: 4.7 %
ERYTHROCYTE [DISTWIDTH] IN BLOOD BY AUTOMATED COUNT: 12.1 %
EST. GFR  (AFRICAN AMERICAN): >60 ML/MIN/1.73 M^2
EST. GFR  (NON AFRICAN AMERICAN): >60 ML/MIN/1.73 M^2
GLUCOSE SERPL-MCNC: 124 MG/DL
GLUCOSE UR QL STRIP: NEGATIVE
HCT VFR BLD AUTO: 41.8 %
HGB BLD-MCNC: 14.6 G/DL
HGB UR QL STRIP: NEGATIVE
INR PPP: 0.9
KETONES UR QL STRIP: NEGATIVE
LEUKOCYTE ESTERASE UR QL STRIP: NEGATIVE
LYMPHOCYTES # BLD AUTO: 2.8 K/UL
LYMPHOCYTES NFR BLD: 39 %
MAGNESIUM SERPL-MCNC: 2.3 MG/DL
MCH RBC QN AUTO: 29.7 PG
MCHC RBC AUTO-ENTMCNC: 34.9 G/DL
MCV RBC AUTO: 85 FL
MONOCYTES # BLD AUTO: 0.6 K/UL
MONOCYTES NFR BLD: 8.9 %
NEUTROPHILS # BLD AUTO: 3.3 K/UL
NEUTROPHILS NFR BLD: 46.6 %
NITRITE UR QL STRIP: NEGATIVE
PH UR STRIP: 5 [PH] (ref 5–8)
PHOSPHATE SERPL-MCNC: 5.5 MG/DL
PLATELET # BLD AUTO: 266 K/UL
PMV BLD AUTO: 9.7 FL
POTASSIUM SERPL-SCNC: 4.1 MMOL/L
PROT SERPL-MCNC: 6.5 G/DL
PROT UR QL STRIP: NEGATIVE
PROTHROMBIN TIME: 9.7 SEC
RBC # BLD AUTO: 4.92 M/UL
SODIUM SERPL-SCNC: 140 MMOL/L
SP GR UR STRIP: 1.01 (ref 1–1.03)
TROPONIN I SERPL DL<=0.01 NG/ML-MCNC: <0.006 NG/ML
URN SPEC COLLECT METH UR: NORMAL
UROBILINOGEN UR STRIP-ACNC: NEGATIVE EU/DL
WBC # BLD AUTO: 7.07 K/UL

## 2017-07-27 PROCEDURE — 63600175 PHARM REV CODE 636 W HCPCS: Performed by: NURSE PRACTITIONER

## 2017-07-27 PROCEDURE — 25000003 PHARM REV CODE 250: Performed by: PHYSICIAN ASSISTANT

## 2017-07-27 PROCEDURE — G8996 SWALLOW CURRENT STATUS: HCPCS | Mod: CI

## 2017-07-27 PROCEDURE — 63600175 PHARM REV CODE 636 W HCPCS: Performed by: STUDENT IN AN ORGANIZED HEALTH CARE EDUCATION/TRAINING PROGRAM

## 2017-07-27 PROCEDURE — A4216 STERILE WATER/SALINE, 10 ML: HCPCS | Performed by: PHYSICIAN ASSISTANT

## 2017-07-27 PROCEDURE — 85610 PROTHROMBIN TIME: CPT

## 2017-07-27 PROCEDURE — 36415 COLL VENOUS BLD VENIPUNCTURE: CPT

## 2017-07-27 PROCEDURE — 97165 OT EVAL LOW COMPLEX 30 MIN: CPT

## 2017-07-27 PROCEDURE — 84484 ASSAY OF TROPONIN QUANT: CPT

## 2017-07-27 PROCEDURE — 99232 SBSQ HOSP IP/OBS MODERATE 35: CPT | Mod: GC,,, | Performed by: PSYCHIATRY & NEUROLOGY

## 2017-07-27 PROCEDURE — 80053 COMPREHEN METABOLIC PANEL: CPT

## 2017-07-27 PROCEDURE — 63600175 PHARM REV CODE 636 W HCPCS: Performed by: PHYSICIAN ASSISTANT

## 2017-07-27 PROCEDURE — 84100 ASSAY OF PHOSPHORUS: CPT

## 2017-07-27 PROCEDURE — 85025 COMPLETE CBC W/AUTO DIFF WBC: CPT

## 2017-07-27 PROCEDURE — 83735 ASSAY OF MAGNESIUM: CPT

## 2017-07-27 PROCEDURE — 97161 PT EVAL LOW COMPLEX 20 MIN: CPT

## 2017-07-27 PROCEDURE — 92610 EVALUATE SWALLOWING FUNCTION: CPT

## 2017-07-27 PROCEDURE — 81003 URINALYSIS AUTO W/O SCOPE: CPT

## 2017-07-27 PROCEDURE — S4991 NICOTINE PATCH NONLEGEND: HCPCS | Performed by: PHYSICIAN ASSISTANT

## 2017-07-27 PROCEDURE — 99222 1ST HOSP IP/OBS MODERATE 55: CPT | Mod: ,,, | Performed by: NURSE PRACTITIONER

## 2017-07-27 PROCEDURE — 85730 THROMBOPLASTIN TIME PARTIAL: CPT

## 2017-07-27 PROCEDURE — G8997 SWALLOW GOAL STATUS: HCPCS | Mod: CH

## 2017-07-27 PROCEDURE — 82553 CREATINE MB FRACTION: CPT

## 2017-07-27 RX ORDER — MIDAZOLAM HYDROCHLORIDE 1 MG/ML
2 INJECTION INTRAMUSCULAR; INTRAVENOUS ONCE AS NEEDED
Status: COMPLETED | OUTPATIENT
Start: 2017-07-27 | End: 2017-07-27

## 2017-07-27 RX ORDER — ACETAMINOPHEN 325 MG/1
650 TABLET ORAL EVERY 6 HOURS PRN
Status: DISCONTINUED | OUTPATIENT
Start: 2017-07-27 | End: 2017-07-27 | Stop reason: HOSPADM

## 2017-07-27 RX ORDER — OXYCODONE HYDROCHLORIDE 5 MG/1
15 TABLET ORAL 3 TIMES DAILY PRN
Status: DISCONTINUED | OUTPATIENT
Start: 2017-07-27 | End: 2017-07-27 | Stop reason: HOSPADM

## 2017-07-27 RX ORDER — IBUPROFEN 200 MG
1 TABLET ORAL DAILY
Refills: 0 | COMMUNITY
Start: 2017-07-27 | End: 2017-10-13

## 2017-07-27 RX ORDER — MIDAZOLAM HYDROCHLORIDE 1 MG/ML
1 INJECTION INTRAMUSCULAR; INTRAVENOUS ONCE AS NEEDED
Status: COMPLETED | OUTPATIENT
Start: 2017-07-27 | End: 2017-07-27

## 2017-07-27 RX ADMIN — LEVOTHYROXINE SODIUM 25 MCG: 25 TABLET ORAL at 06:07

## 2017-07-27 RX ADMIN — MIDAZOLAM HYDROCHLORIDE 1 MG: 1 INJECTION, SOLUTION INTRAMUSCULAR; INTRAVENOUS at 05:07

## 2017-07-27 RX ADMIN — ASPIRIN 325 MG ORAL TABLET 325 MG: 325 PILL ORAL at 09:07

## 2017-07-27 RX ADMIN — Medication 3 ML: at 01:07

## 2017-07-27 RX ADMIN — HEPARIN SODIUM 5000 UNITS: 5000 INJECTION, SOLUTION INTRAVENOUS; SUBCUTANEOUS at 06:07

## 2017-07-27 RX ADMIN — HEPARIN SODIUM 5000 UNITS: 5000 INJECTION, SOLUTION INTRAVENOUS; SUBCUTANEOUS at 12:07

## 2017-07-27 RX ADMIN — MIDAZOLAM HYDROCHLORIDE 2 MG: 1 INJECTION, SOLUTION INTRAMUSCULAR; INTRAVENOUS at 02:07

## 2017-07-27 RX ADMIN — Medication 3 ML: at 06:07

## 2017-07-27 RX ADMIN — OXYCODONE HYDROCHLORIDE 15 MG: 5 TABLET ORAL at 12:07

## 2017-07-27 RX ADMIN — NICOTINE 1 PATCH: 21 PATCH, EXTENDED RELEASE TRANSDERMAL at 09:07

## 2017-07-27 NOTE — NURSING
Patient arrived with [facility] via stretcher. On entering room, this rn finds spouse in room who notifies this rn that patient has ambulated to bathroom. Visualized patient ambulate safely back to bed, stable condition; called & requested tele box from central telemetry.   Paged & notified KAYODE Chacon with stroke team that patient has arrived in room

## 2017-07-27 NOTE — NURSING
Pt. D/c with wife and son home. All d/c instructions and referrals given and explained to pt. Educated pt. On risks of stroke, s/s of stroke, and when to call for emergency services. Pt. And wife verbalized understanding. All IV lines d/c with catheter's intact.  Pt.'s vss and denies pain.

## 2017-07-27 NOTE — CONSULTS
[facility]  Adult Nutrition  Consult Note    SUMMARY     Reason for Assessment    Reason for Assessment: physician consult (Stroke Pathway)  Diagnosis: stroke/CVA  Relevent Medical History: CVA, Aphasia, High cholesterol       General Information Comments: Pt eating breakfast in room at time of visit. Offered Stroke Nutrition Therapy education. Pt was not very interested in making a change. Reinforced lowering his fat and sodium intake. Left educational handout with patient.     Nutrition Discharge Planning: Pt to d/c on a low salt, low fat diet.     Nutrition Risk    Level of Risk:  (consult as needed)    Nutrition Follow-Up     No

## 2017-07-27 NOTE — NURSING TRANSFER
Nursing Transfer Note      7/27/2017     Transfer: to MRI   Transfer via: stretcher    Transfer with: tele     Transported by: pt. Transport     Medicines sent: none  Chart send with patient: no    Notified: spouse and son     Patient reassessed at:     Upon arrival to floor:

## 2017-07-27 NOTE — PLAN OF CARE
07/27/17 1312   Discharge Assessment   Assessment Type Discharge Planning Assessment   Confirmed/corrected address and phone number on facesheet? Yes   Assessment information obtained from? Patient   Expected Length of Stay (days) 2   Prior to hospitilization cognitive status: Alert/Oriented   Prior to hospitalization functional status: Independent   Current cognitive status: Alert/Oriented   Current Functional Status: Independent   Arrived From home or self-care   Lives With spouse   Able to Return to Prior Arrangements yes   Is patient able to care for self after discharge? Yes   Does the patient have family/friends to help with healtcare needs after discharge? yes   How many people do you have in your home that can help with your care after discharge? 1   Patient currently being followed by outpatient case management? No   Patient currently receives home health services? No   Does the patient currently use HME? No   Patient currently receives private duty nursing? N/A   Patient currently receives any other outside agency services? No   Equipment Currently Used at Home none   Do you have any problems affording any of your prescribed medications? No   Is the patient taking medications as prescribed? yes   Do you have any financial concerns preventing you from receiving the healthcare you need? No   Does the patient have transportation to healthcare appointments? Yes   Transportation Available family or friend will provide   On Dialysis? No   Does the patient receive services at the Coumadin Clinic? No   Are there any open cases? No   Discharge Plan A Home   Discharge Plan B Home with family   Patient/Family In Agreement With Plan yes

## 2017-07-27 NOTE — PLAN OF CARE
Called by RN that pt was in MRI area after being unable to tolerate MRI scan, despite 2 mg versed IV and his home oxycodone.    I went to MRI where Mr. Alonzo and I had an extended conversation alone regarding his symptoms and refusal to get an MRI.  He stated he could not tolerate the closed MRI because of claustrophobia, and in the past had only been able to have open MRIs.  I explained that a closed MRI is needed for imaging of the brain, and acknowledged that open MRIs are acceptable for other areas of the body.  Discussed the importance of MRI in assessing for recurrence of stroke.  I expressed concerns that sometimes there are home stressors that can cause people to exhibit symptoms similar to a new stroke or of a prior stroke.  After further discussion, he told me that there is a female individual living with him in his house with his family.  He expressed that she has brought considerable stress into the household and has overstayed her welcome.  When asked if some of this stress could be contributing to his symptoms, he stated he thought it is certainly possible.     He stated that the current plan is for this female staying with him and his family to leave on 8/3.  We discussed techniques to ensure that this will happen, including making preparations for her departure such as setting a departure time on 8/3, ensuring she has a ride, ensuring she has another place to stay (already arranged, per pt), and helping her pack in the preceding days.  Pt stated the rest of his family was very stressed and eager for this female individual to leave.  I recommended that he and his family select a spokesperson, not necessarily the pt himself, to firmly approach this individual with the above plan and ensure that she departs as previously agreed on 8/3.      I asked the pt if it was helpful for him to get out of the house and this stressful situation.  He agreed. I suggested that he pursue outpatient physical therapy  (and recommended by PT), to which he is agreeable.  Additionally, I offered a referral to psychology to help develop coping mechanisms to handle the above stresses, to which he agreed.    With the above in mind, and refusal to tolerate MRI, pt agreeable to discharge to home with outpt physical therapy referral and psychology referral.    Discussed with Dr. Peterson.      Sincere Castro MD  4:24 PM

## 2017-07-27 NOTE — HOSPITAL COURSE
7/26 - 42 year old male transferred for stroke work up after telemedicine consult for worsening L hemispheric symptoms.   7/27 - Discharge to home with ambulatory psychology referral.  Unable to obtain MRI 2/2 anxiety/claustrophobia.  Significant home stressors, pt admits may be contributing to symptoms.

## 2017-07-27 NOTE — PROGRESS NOTES
[facility]  Vascular Neurology  Comprehensive Stroke Center  Progress Note    Assessment/Plan:     7/26 - 42 year old male transferred for stroke work up after telemedicine consult for worsening L hemispheric symptoms.   7/27 - Discharge to home with ambulatory psychology referral.  Unable to obtain MRI 2/2 anxiety/claustrophobia.  Significant home stressors, pt admits may be contributing to symptoms.    * Right sided weakness    Mr. Alonzo is a 42 Year old male who was discharged on 7/15/17 for tPA aborted, MRI negative thrombotic L MCA re-presented to [facility] today with worsening of prior stroke symptoms. Reported that on 7/26 at 1:30 am he awoke with symptoms of slurred speech, right sided numbness and right sided weakness worse than at discharge. The patient was seen via telestroke and was transferred here for stroke workup.    Differential includes: Stroke, TIA, MS, conversion/psychogenic etiology.  Unable to tolerate MRI after two attempts, even with anxiolytics.  Significant home stressors regarding a female individual staying with the pt and family.  The stress of which pt admits may be contributing to his symptoms.  See separate plan of care note dated 7/27.     Qdaily  Rosuvastatin 40 Qdaily  Nicotine patch; smoking cessation education/referral  PT/OT/ST -> Outpt PT  Psychology referral -> Anxiety, conversion disorder?  Work on coping methods for home stressors.    Discharge to home on , Rosuvastatin 40.  Psychology, smoking cessation education, and outpt PT referrals.  F/u with PCP in 1 week, vascular neurology in 4-6 weeks.          Anxiety    Possible conversion disorder with home stressors manifesting as R-sided weakness.  -Extensive discussion regarding minimizing and alleviating home stressors  -Ambulatory referral to psychology        Current every day smoker    Stroke risk factor  - Nicotine patch   - Smoking cessation education/referral        Mixed  hyperlipidemia    Stroke risk factor  -.8  -Continue rosuvastatin 40 QHS            Neurologic Chief Complaint: R-sided weakness    Subjective:     Interval History: Patient is seen for follow-up neurological assessment and treatment recommendations:   No acute events overnight.  Unable to complete MRI earlier this AM secondary to claustrophobia.  Pt unable to tolerate repeat MRI this afternoon.        Extended discussion between myself and pt in MRI after he refused second MRI.  Briefly, pt expresses there are significant stressors at home with a female individual living with him and his family, and this individual has overstayed her welcome.  Discussed the possibility that the symptoms he experienced could be 2/2 stress at home.  Pt agreeable to discharge today to home with plan in place for departure of this female, and referral to outpt psychology and outpt PT.  Please see separate plan of care by myself dated 7/27 for further details.      HPI, Past Medical, Family, and Social History remains the same as documented in the initial encounter.     Review of Systems   Constitutional: Negative for chills and fever.   HENT: Negative for hearing loss.    Eyes: Negative for visual disturbance.   Respiratory: Negative for shortness of breath.    Cardiovascular: Negative for chest pain.   Gastrointestinal: Negative for abdominal pain.   Neurological: Positive for weakness and numbness. Negative for facial asymmetry and headaches.     Scheduled Meds:   aspirin  325 mg Oral Daily    heparin (porcine)  5,000 Units Subcutaneous Q8H    levothyroxine  25 mcg Oral Before breakfast    nicotine  1 patch Transdermal Daily    rosuvastatin  40 mg Oral QHS    sodium chloride 0.9%  3 mL Intravenous Q8H     Continuous Infusions:   sodium chloride 0.9%       PRN Meds:acetaminophen, labetalol, oxycodone, sodium chloride 0.9%    Objective:     Vital Signs (Most Recent):  Temp: 97.9 °F (36.6 °C) (07/27/17 0800)  Pulse: 65  (17 1254)  Resp: 18 (17 1254)  BP: (!) 140/79 (17 1254)  SpO2: 98 % (17 0800)  BP Location: Right arm    Vital Signs Range (Last 24H):  Temp:  [97.4 °F (36.3 °C)-98.1 °F (36.7 °C)]   Pulse:  [58-75]   Resp:  [18]   BP: (106-140)/(52-81)   SpO2:  [94 %-98 %]   BP Location: Right arm    Physical Exam   Constitutional: He appears well-developed and well-nourished. No distress.   HENT:   Head: Normocephalic and atraumatic.   Cardiovascular: Normal rate, regular rhythm and normal heart sounds.  Exam reveals no friction rub.    No murmur heard.  Pulmonary/Chest: Effort normal and breath sounds normal. No respiratory distress. He has no wheezes.   Abdominal: Soft. Bowel sounds are normal. He exhibits no distension. There is no tenderness.   Musculoskeletal: He exhibits no edema.   Neurological: GCS eye subscore is 4 - spontaneous. GCS verbal subscore is 5 - oriented. GCS motor subscore is 6 - obeys commands.       Neurological Exam:   LOC: alert and follows requests  Language: No aphasia  Speech: No dysarthria  Orientation: Person, Place, Time  Visual Fields (CN II): Full  EOM (CN III, IV, VI): Full/intact  Facial Sensation (CN V): Symmetric  Facial Movement (CN VII): symmetric facial expression  Reflexes: flexor plantar responses bilaterally and 1+ biceps, brachioradialis, triceps, patellar, achilles b/l  Motor*: With distracting motions: Arm Left:  Normal (5/5), Leg Left:   Normal (5/5), Arm Right:   Normal (5/5), Leg Right:   Normal (5/5)  Sensation: intact to light touch in BUE and BLE    NIH Stroke Scale:    Level of Consciousness: 0 - alert  LOC Questions: 0 - answers both correctly  LOC Commands: 0 - performs both correctly  Best Gaze: 0 - normal  Visual: 0 - no visual loss  Facial Palsy: 0 - normal  Motor Left Arm: 0 - no drift  Motor Right Arm: 0 - no drift  Motor Left Le - no drift  Motor Right Le - no drift  Limb Ataxia: 0 - absent  Sensory: 0 - normal  Best Language: 0 - no  aphasia  Dysarthria: 0 - normal articulation  Extinction and Inattention: 0 - no neglect  NIH Stroke Scale Total: 0  P[place]on Coma Scale:  Best Eye Response: 4 - spontaneous  Best Motor Response: 6 - obeys commands  Best Verbal Response: 5 - oriented  Ubaldo Coma Scale Total: 15  Modified Kelly Scale:   Timeline:  Modified Kelly Score: 4 - moderately severe disability (requires cane at baseline (before recent admission) to ambulate)        Laboratory:  Recent Results (from the past 24 hour(s))   Comprehensive metabolic panel    Collection Time: 07/27/17  4:46 AM   Result Value Ref Range    Sodium 140 136 - 145 mmol/L    Potassium 4.1 3.5 - 5.1 mmol/L    Chloride 104 95 - 110 mmol/L    CO2 25 23 - 29 mmol/L    Glucose 124 (H) 70 - 110 mg/dL    BUN, Bld 13 6 - 20 mg/dL    Creatinine 0.9 0.5 - 1.4 mg/dL    Calcium 8.8 8.7 - 10.5 mg/dL    Total Protein 6.5 6.0 - 8.4 g/dL    Albumin 3.5 3.5 - 5.2 g/dL    Total Bilirubin 0.3 0.1 - 1.0 mg/dL    Alkaline Phosphatase 101 55 - 135 U/L    AST 25 10 - 40 U/L    ALT 39 10 - 44 U/L    Anion Gap 11 8 - 16 mmol/L    eGFR if African American >60.0 >60 mL/min/1.73 m^2    eGFR if non African American >60.0 >60 mL/min/1.73 m^2   Magnesium    Collection Time: 07/27/17  4:46 AM   Result Value Ref Range    Magnesium 2.3 1.6 - 2.6 mg/dL   Phosphorus    Collection Time: 07/27/17  4:46 AM   Result Value Ref Range    Phosphorus 5.5 (H) 2.7 - 4.5 mg/dL   CBC auto differential    Collection Time: 07/27/17  4:46 AM   Result Value Ref Range    WBC 7.07 3.90 - 12.70 K/uL    RBC 4.92 4.60 - 6.20 M/uL    Hemoglobin 14.6 14.0 - 18.0 g/dL    Hematocrit 41.8 40.0 - 54.0 %    MCV 85 82 - 98 fL    MCH 29.7 27.0 - 31.0 pg    MCHC 34.9 32.0 - 36.0 g/dL    RDW 12.1 11.5 - 14.5 %    Platelets 266 150 - 350 K/uL    MPV 9.7 9.2 - 12.9 fL    Gran # 3.3 1.8 - 7.7 K/uL    Lymph # 2.8 1.0 - 4.8 K/uL    Mono # 0.6 0.3 - 1.0 K/uL    Eos # 0.3 0.0 - 0.5 K/uL    Baso # 0.05 0.00 - 0.20 K/uL    Gran% 46.6 38.0 - 73.0  %    Lymph% 39.0 18.0 - 48.0 %    Mono% 8.9 4.0 - 15.0 %    Eosinophil% 4.7 0.0 - 8.0 %    Basophil% 0.7 0.0 - 1.9 %    Differential Method Automated    Troponin I    Collection Time: 07/27/17  4:46 AM   Result Value Ref Range    Troponin I <0.006 0.000 - 0.026 ng/mL   CK-MB    Collection Time: 07/27/17  4:46 AM   Result Value Ref Range     (H) 20 - 200 U/L    CPK MB 5.3 0.1 - 6.5 ng/mL    MB% 2.1 0.0 - 5.0 %   APTT    Collection Time: 07/27/17  4:46 AM   Result Value Ref Range    aPTT 24.2 21.0 - 32.0 sec   Protime-INR    Collection Time: 07/27/17  4:46 AM   Result Value Ref Range    Prothrombin Time 9.7 9.0 - 12.5 sec    INR 0.9 0.8 - 1.2   Urinalysis - clean catch    Collection Time: 07/27/17  5:00 AM   Result Value Ref Range    Specimen UA Urine, Clean Catch     Color, UA Yellow Yellow, Straw, Catherine    Appearance, UA Clear Clear    pH, UA 5.0 5.0 - 8.0    Specific Gravity, UA 1.015 1.005 - 1.030    Protein, UA Negative Negative    Glucose, UA Negative Negative    Ketones, UA Negative Negative    Bilirubin (UA) Negative Negative    Occult Blood UA Negative Negative    Nitrite, UA Negative Negative    Urobilinogen, UA Negative <2.0 EU/dL    Leukocytes, UA Negative Negative         Diagnostic Results:  Unable to obtain MRI brain after 2 attempts 2/2 anxiety/claustrophobia.         Sincere Castro MD  Comprehensive Stroke Center  Department of Vascular Neurology   [facility]

## 2017-07-27 NOTE — SUBJECTIVE & OBJECTIVE
Neurologic Chief Complaint: R-sided weakness    Subjective:     Interval History: Patient is seen for follow-up neurological assessment and treatment recommendations:   No acute events overnight.  Unable to complete MRI earlier this AM secondary to claustrophobia.  Pt unable to tolerate repeat MRI this afternoon.        Extended discussion between myself and pt in MRI after he refused second MRI.  Briefly, pt expresses there are significant stressors at home with a female individual living with him and his family, and this individual has overstayed her welcome.  Discussed the possibility that the symptoms he experienced could be 2/2 stress at home.  Pt agreeable to discharge today to home with plan in place for departure of this female, and referral to outpt psychology and outpt PT.  Please see separate plan of care by myself dated 7/27 for further details.      HPI, Past Medical, Family, and Social History remains the same as documented in the initial encounter.     Review of Systems   Constitutional: Negative for chills and fever.   HENT: Negative for hearing loss.    Eyes: Negative for visual disturbance.   Respiratory: Negative for shortness of breath.    Cardiovascular: Negative for chest pain.   Gastrointestinal: Negative for abdominal pain.   Neurological: Positive for weakness and numbness. Negative for facial asymmetry and headaches.     Scheduled Meds:   aspirin  325 mg Oral Daily    heparin (porcine)  5,000 Units Subcutaneous Q8H    levothyroxine  25 mcg Oral Before breakfast    nicotine  1 patch Transdermal Daily    rosuvastatin  40 mg Oral QHS    sodium chloride 0.9%  3 mL Intravenous Q8H     Continuous Infusions:   sodium chloride 0.9%       PRN Meds:acetaminophen, labetalol, oxycodone, sodium chloride 0.9%    Objective:     Vital Signs (Most Recent):  Temp: 97.9 °F (36.6 °C) (07/27/17 0800)  Pulse: 65 (07/27/17 1254)  Resp: 18 (07/27/17 1254)  BP: (!) 140/79 (07/27/17 1254)  SpO2: 98 %  (17 0800)  BP Location: Right arm    Vital Signs Range (Last 24H):  Temp:  [97.4 °F (36.3 °C)-98.1 °F (36.7 °C)]   Pulse:  [58-75]   Resp:  [18]   BP: (106-140)/(52-81)   SpO2:  [94 %-98 %]   BP Location: Right arm    Physical Exam   Constitutional: He appears well-developed and well-nourished. No distress.   HENT:   Head: Normocephalic and atraumatic.   Cardiovascular: Normal rate, regular rhythm and normal heart sounds.  Exam reveals no friction rub.    No murmur heard.  Pulmonary/Chest: Effort normal and breath sounds normal. No respiratory distress. He has no wheezes.   Abdominal: Soft. Bowel sounds are normal. He exhibits no distension. There is no tenderness.   Musculoskeletal: He exhibits no edema.   Neurological: GCS eye subscore is 4 - spontaneous. GCS verbal subscore is 5 - oriented. GCS motor subscore is 6 - obeys commands.       Neurological Exam:   LOC: alert and follows requests  Language: No aphasia  Speech: No dysarthria  Orientation: Person, Place, Time  Visual Fields (CN II): Full  EOM (CN III, IV, VI): Full/intact  Facial Sensation (CN V): Symmetric  Facial Movement (CN VII): symmetric facial expression  Reflexes: flexor plantar responses bilaterally and 1+ biceps, brachioradialis, triceps, patellar, achilles b/l  Motor*: With distracting motions: Arm Left:  Normal (5/5), Leg Left:   Normal (5/5), Arm Right:   Normal (5/5), Leg Right:   Normal (5/5)  Sensation: intact to light touch in BUE and BLE    NIH Stroke Scale:    Level of Consciousness: 0 - alert  LOC Questions: 0 - answers both correctly  LOC Commands: 0 - performs both correctly  Best Gaze: 0 - normal  Visual: 0 - no visual loss  Facial Palsy: 0 - normal  Motor Left Arm: 0 - no drift  Motor Right Arm: 0 - no drift  Motor Left Le - no drift  Motor Right Le - no drift  Limb Ataxia: 0 - absent  Sensory: 0 - normal  Best Language: 0 - no aphasia  Dysarthria: 0 - normal articulation  Extinction and Inattention: 0 - no  neglect  NIH Stroke Scale Total: 0  [place] Coma Scale:  Best Eye Response: 4 - spontaneous  Best Motor Response: 6 - obeys commands  Best Verbal Response: 5 - oriented  Ubaldo Coma Scale Total: 15  Modified [place]amm[place] Scale:   Timeline:  Modified Kelly Score: 4 - moderately severe disability (requires cane at baseline (before recent admission) to ambulate)        Laboratory:  Recent Results (from the past 24 hour(s))   Comprehensive metabolic panel    Collection Time: 07/27/17  4:46 AM   Result Value Ref Range    Sodium 140 136 - 145 mmol/L    Potassium 4.1 3.5 - 5.1 mmol/L    Chloride 104 95 - 110 mmol/L    CO2 25 23 - 29 mmol/L    Glucose 124 (H) 70 - 110 mg/dL    BUN, Bld 13 6 - 20 mg/dL    Creatinine 0.9 0.5 - 1.4 mg/dL    Calcium 8.8 8.7 - 10.5 mg/dL    Total Protein 6.5 6.0 - 8.4 g/dL    Albumin 3.5 3.5 - 5.2 g/dL    Total Bilirubin 0.3 0.1 - 1.0 mg/dL    Alkaline Phosphatase 101 55 - 135 U/L    AST 25 10 - 40 U/L    ALT 39 10 - 44 U/L    Anion Gap 11 8 - 16 mmol/L    eGFR if African American >60.0 >60 mL/min/1.73 m^2    eGFR if non African American >60.0 >60 mL/min/1.73 m^2   Magnesium    Collection Time: 07/27/17  4:46 AM   Result Value Ref Range    Magnesium 2.3 1.6 - 2.6 mg/dL   Phosphorus    Collection Time: 07/27/17  4:46 AM   Result Value Ref Range    Phosphorus 5.5 (H) 2.7 - 4.5 mg/dL   CBC auto differential    Collection Time: 07/27/17  4:46 AM   Result Value Ref Range    WBC 7.07 3.90 - 12.70 K/uL    RBC 4.92 4.60 - 6.20 M/uL    Hemoglobin 14.6 14.0 - 18.0 g/dL    Hematocrit 41.8 40.0 - 54.0 %    MCV 85 82 - 98 fL    MCH 29.7 27.0 - 31.0 pg    MCHC 34.9 32.0 - 36.0 g/dL    RDW 12.1 11.5 - 14.5 %    Platelets 266 150 - 350 K/uL    MPV 9.7 9.2 - 12.9 fL    Gran # 3.3 1.8 - 7.7 K/uL    Lymph # 2.8 1.0 - 4.8 K/uL    Mono # 0.6 0.3 - 1.0 K/uL    Eos # 0.3 0.0 - 0.5 K/uL    Baso # 0.05 0.00 - 0.20 K/uL    Gran% 46.6 38.0 - 73.0 %    Lymph% 39.0 18.0 - 48.0 %    Mono% 8.9 4.0 - 15.0 %    Eosinophil% 4.7 0.0  - 8.0 %    Basophil% 0.7 0.0 - 1.9 %    Differential Method Automated    Troponin I    Collection Time: 07/27/17  4:46 AM   Result Value Ref Range    Troponin I <0.006 0.000 - 0.026 ng/mL   CK-MB    Collection Time: 07/27/17  4:46 AM   Result Value Ref Range     (H) 20 - 200 U/L    CPK MB 5.3 0.1 - 6.5 ng/mL    MB% 2.1 0.0 - 5.0 %   APTT    Collection Time: 07/27/17  4:46 AM   Result Value Ref Range    aPTT 24.2 21.0 - 32.0 sec   Protime-INR    Collection Time: 07/27/17  4:46 AM   Result Value Ref Range    Prothrombin Time 9.7 9.0 - 12.5 sec    INR 0.9 0.8 - 1.2   Urinalysis - clean catch    Collection Time: 07/27/17  5:00 AM   Result Value Ref Range    Specimen UA Urine, Clean Catch     Color, UA Yellow Yellow, Straw, Catherine    Appearance, UA Clear Clear    pH, UA 5.0 5.0 - 8.0    Specific Gravity, UA 1.015 1.005 - 1.030    Protein, UA Negative Negative    Glucose, UA Negative Negative    Ketones, UA Negative Negative    Bilirubin (UA) Negative Negative    Occult Blood UA Negative Negative    Nitrite, UA Negative Negative    Urobilinogen, UA Negative <2.0 EU/dL    Leukocytes, UA Negative Negative         Diagnostic Results:  Unable to obtain MRI brain after 2 attempts 2/2 anxiety/claustrophobia.

## 2017-07-27 NOTE — H&P
[facility]  Vascular Neurology  Comprehensive Stroke Center  History & Physical    Consults  Assessment/Plan:     Patient is a 42 y.o. year old male with:    CVA (cerebral vascular accident)    Prior history of tpa aborted MRI negative stroke  Now with worsening symptoms  Continue asa 325 and crestor 40 mg daily   Nicotine patch   PT/OT/ speech   bp to be kept below 220/110  Hep vte.     MRI, MRA, elpidio screen                     Current every day smoker    Stroke risk factor  Should have cessation counseling  Nicotine patch         Mixed hyperlipidemia    Stroke risk factor - continue crestor            Thrombolysis Candidate? No  1. Contraindications: Outside of treament window      Interventional Revascularization Candidate?  No large vessel occlusion     Research Candidate? No     Subjective:     History of Present Illness:  42 Year old male who was discharged on 7/15/17 for tPA aborted, MRI negative thrombotic L MCA re-presented to [facility] today with worsening of prior stroke symptoms. Reported that at 1:30 am he awoke with symptoms of slurred speech, right sided numbness and right sided weakness worse than at discharge. The patient was seen via telestroke and was transferred here for stroke work up and care.   No identified triggers, aggravators or relievers     The patient reports no new vision changes but endorses chronic back and shoulder pain for which he takes roxicodone.            Past Medical History:   Diagnosis Date    Arthritis     High cholesterol      Past Surgical History:   Procedure Laterality Date    SPINAL FIXATION SURGERY       No family history on file.  Social History   Substance Use Topics    Smoking status: Current Every Day Smoker     Packs/day: 0.50     Types: Cigarettes    Smokeless tobacco: Never Used    Alcohol use No     Review of patient's allergies indicates:   Allergen Reactions    Tramadol Anaphylaxis    Tomato (solanum lycopersicum) Hives      Medications: I have reviewed the current medication administration record.    No prescriptions prior to admission.       Review of Systems   Constitutional: Negative for chills and fever.   HENT: Negative for facial swelling.    Eyes: Negative for visual disturbance.   Respiratory: Positive for cough.         (+) smoker   Cardiovascular: Negative for leg swelling.   Gastrointestinal: Negative for abdominal distention.   Musculoskeletal: Positive for back pain (chronic ).   Skin: Negative for color change.   Neurological: Positive for speech difficulty and weakness.   Psychiatric/Behavioral: Negative for agitation.     Objective:     Vital Signs (Most Recent):       Vital Signs Range (Last 24H):  Temp:  [97.2 °F (36.2 °C)-98.1 °F (36.7 °C)]   Pulse:  [59-84]   Resp:  [16-17]   BP: (105-122)/(52-83)   SpO2:  [94 %-98 %]     Physical Exam   Constitutional: He is oriented to person, place, and time. He appears well-developed and well-nourished.   HENT:   Head: Normocephalic and atraumatic.   Eyes: Pupils are equal, round, and reactive to light.   Cardiovascular: Normal rate.    Pulmonary/Chest: Effort normal.   Abdominal: Soft.   Musculoskeletal: He exhibits no edema.   Neurological: He is alert and oriented to person, place, and time. GCS eye subscore is 4 - spontaneous. GCS verbal subscore is 5 - oriented. GCS motor subscore is 6 - obeys commands.   Skin: Skin is warm and dry.   Psychiatric: He has a normal mood and affect.   Nursing note and vitals reviewed.      Neurological Exam:   LOC: alert and follows requests  Language: No aphasia  Speech: Dysarthria, per wife  Orientation: Person, Place, Time  Memory: Recent memory intact, Remote memory intact, Age correct, Month correct  Visual Fields (CN II): Full  EOM (CN III, IV, VI): Full/intact  Oculocephalics: normal  Pupils (CN III, IV, VI): PERRL  Facial Sensation (CN V): Symmetric  Facial Movement (CN VII): symmetric facial expression  Hearing (CN VIII): intact  bilaterally  Gag Reflex (CN IX, X): normal/symmetric  Shoulder/Neck (CN XI): SCM-Left: Normal ; SCM-Right: weak, reported limited by pain also ; Shoulder Shrug: Weak:  right  Motor*: Arm Left:  Normal (5/5), Leg Left:   Normal (5/5), Arm Right:   Paretic:  3/5, Leg Right:   Paretic:  3/5  Cerebellar*: Not testable due to weakness on right  Sensation: intact to light touch but reports less sensation on right  Tone: Arm-Left: normal; Leg-Left: normal; Arm-Right: normal; Leg-Right: normal    NIH Stroke Scale:    Level of Consciousness: 0 - alert  LOC Questions: 0 - answers both correctly  LOC Commands: 0 - performs both correctly  Best Gaze: 0 - normal  Visual: 0 - no visual loss  Facial Palsy: 0 - normal  Motor Left Arm: 0 - no drift  Motor Right Arm: 1 - drift  Motor Left Le - no drift  Motor Right Le - drift  Limb Ataxia: 0 - absent  Sensory: 1 - mild to moderate loss  Best Language: 0 - no aphasia  Dysarthria: 1 - mild to moderate dysarthria  Extinction and Inattention: 0 - no neglect  NIH Stroke Scale Total: 4  Ubaldo Coma Scale:  Best Eye Response: 4 - spontaneous  Best Motor Response: 6 - obeys commands  Best Verbal Response: 5 - oriented  [place] Coma Scale Total: 15  Modified Rosine Scale:   Timeline: Prior to symptoms onset  Modified Kelly Score: 2 - slight disability        Laboratory:  CMP:   Recent Labs  Lab 17  0648   CALCIUM 9.3   ALBUMIN 3.7   PROT 7.1      K 3.9   CO2 24      BUN 13   CREATININE 1.1   ALKPHOS 89   ALT 43   AST 28   BILITOT 0.4     CBC:   Recent Labs  Lab 1748   WBC 7.40   RBC 4.84   HGB 14.4   HCT 41.5      MCV 86   MCH 29.7   MCHC 34.6     Lipid Panel:   Recent Labs  Lab 17  0648   CHOL 164   LDLCALC 104.8   HDL 35*   TRIG 121     Coagulation:   Recent Labs  Lab 1748   INR 1.0     Platelet Aggregation Study: No results for input(s): PLTAGG, PLTAGINTERP, PLTAGREGLACO, ADPPLTAGGREG in the last 168 hours.  Hgb A1C: No results  for input(s): HGBA1C in the last 168 hours.  TSH:   Recent Labs  Lab 07/26/17  0648   TSH 7.330*       Diagnostic Results:  Brain Imaging:   CT head 7/26/17  1.  No acute intracranial abnormalities seen.      Echo from last admission 7/14/17  Normal LA   1 - Normal left ventricular systolic function (EF 60-65%).     2 - No wall motion abnormalities.     3 - Normal left ventricular diastolic function.     4 - Normal right ventricular systolic function .     MRI from 7/15/17 - no acute infarct or hemorrhage       KAYODE Bal  Comprehensive Stroke Center  Department of Vascular Neurology   [facility]

## 2017-07-27 NOTE — PROGRESS NOTES
"Upon arrival to MRI, pt confirmed that he had gotten anxiolysis medication and was calm and ready to have MRI. Cooperative getting on table but squeezed bulb before MRI began. Pt attempting to remove medical equipment and states, "I can't do this". Asked if it were because of anxiety, pt states "no" but once back on his stretcher pt states, " I'm just too claustrophobic".   Pt returned to continuous telemetry and viewing confirmed with centralized monitoring.   Informed staff nurse that pt was not able to tolerate MRI.  "

## 2017-07-27 NOTE — PLAN OF CARE
Problem: Patient Care Overview  Goal: Plan of Care Review  Outcome: Ongoing (interventions implemented as appropriate)  POC reviewed with patient & spouse, verbalized understanding. nih stroke scale completed by charge nurse, patient passed elpidio, diet advanced to cardiac. Transport coordinated with mri to complete scan with sedation. Patient resting comfortably at this time with family at bedside; bed alarm activated, call light in reach. Will monitor closely

## 2017-07-27 NOTE — ASSESSMENT & PLAN NOTE
Mr. Alonzo is a 42 Year old male who was discharged on 7/15/17 for tPA aborted, MRI negative thrombotic L MCA re-presented to [facility] today with worsening of prior stroke symptoms. Reported that on 7/26 at 1:30 am he awoke with symptoms of slurred speech, right sided numbness and right sided weakness worse than at discharge. The patient was seen via telestroke and was transferred here for stroke workup.    Differential includes: Stroke, TIA, MS, conversion/psychogenic etiology.  Unable to tolerate MRI after two attempts, even with anxiolytics.  Significant home stressors regarding a female individual staying with the pt and family.  The stress of which pt admits may be contributing to his symptoms.  See separate plan of care note dated 7/27.     Qdaily  Rosuvastatin 40 Qdaily  Nicotine patch; smoking cessation education/referral  PT/OT/ST -> Outpt PT  Psychology referral -> Anxiety, conversion disorder?  Work on coping methods for home stressors.    Discharge to home on , Rosuvastatin 40.  Psychology, smoking cessation education, and outpt PT referrals.  F/u with PCP in 1 week, vascular neurology in 4-6 weeks.

## 2017-07-27 NOTE — SUBJECTIVE & OBJECTIVE
Past Medical History:   Diagnosis Date    Arthritis     High cholesterol      Past Surgical History:   Procedure Laterality Date    SPINAL FIXATION SURGERY       Review of patient's allergies indicates:   Allergen Reactions    Tramadol Anaphylaxis    Tomato (solanum lycopersicum) Hives       Scheduled Medications:    aspirin  325 mg Oral Daily    heparin (porcine)  5,000 Units Subcutaneous Q8H    levothyroxine  25 mcg Oral Before breakfast    nicotine  1 patch Transdermal Daily    oxycodone  15 mg Oral Q8H    rosuvastatin  40 mg Oral QHS    sodium chloride 0.9%  3 mL Intravenous Q8H       PRN Medications: labetalol, midazolam (PF), sodium chloride 0.9%    Family History     Family history is unknown by patient.        Social History Main Topics    Smoking status: Current Every Day Smoker     Packs/day: 0.50     Types: Cigarettes    Smokeless tobacco: Never Used    Alcohol use No    Drug use: No    Sexual activity: Yes     Partners: Female     Review of Systems   Constitutional: Negative for chills, fatigue and fever.   HENT: Negative for drooling, hearing loss, trouble swallowing and voice change.    Eyes: Negative for pain and visual disturbance.   Respiratory: Negative for cough, shortness of breath and wheezing.    Cardiovascular: Negative for chest pain and palpitations.   Gastrointestinal: Negative for abdominal pain, nausea and vomiting.   Genitourinary: Negative for difficulty urinating and flank pain.   Musculoskeletal: Positive for arthralgias, back pain and myalgias. Negative for neck pain.   Skin: Negative for rash and wound.   Neurological: Positive for weakness. Negative for dizziness, numbness and headaches.   Psychiatric/Behavioral: Negative for agitation and hallucinations. The patient is not nervous/anxious.      Objective:     Vital Signs (Most Recent):  Temp: 97.9 °F (36.6 °C) (07/27/17 0800)  Pulse: 65 (07/27/17 1254)  Resp: 18 (07/27/17 1254)  BP: (!) 140/79 (07/27/17  1254)  SpO2: 98 % (17 0800)    Vital Signs (24h Range):  Temp:  [97.4 °F (36.3 °C)-98.1 °F (36.7 °C)] 97.9 °F (36.6 °C)  Pulse:  [58-75] 65  Resp:  [18] 18  SpO2:  [94 %-98 %] 98 %  BP: (106-140)/(52-81) 140/79     Body mass index is 30.04 kg/m².    Physical Exam   Constitutional: He appears well-developed and well-nourished. No distress.   Appears older than stated age   HENT:   Head: Normocephalic and atraumatic.   Right Ear: External ear normal.   Left Ear: External ear normal.   Nose: Nose normal.   Eyes: Right eye exhibits no discharge. Left eye exhibits no discharge. No scleral icterus.   Neck: Normal range of motion.   Cardiovascular: Normal rate, regular rhythm and intact distal pulses.    Pulmonary/Chest: Effort normal. No respiratory distress. He has no wheezes.   Abdominal: Soft. He exhibits no distension. There is no tenderness.   Musculoskeletal: Normal range of motion. He exhibits no edema or tenderness.   Neurological:   -  Mental Status:  AAOx3.  Follows commands.  Answers correct age and .  Recent and remote memory intact.  -  Speech and language:  no aphasia or dysarthria.    -  Vision:  no hemianopsia or ptosis.    -  Facial movement (CN VII): symmetrical  -  Motor:  No pronator drift. RUE: 4/5.  LUE: 5/5.  RLE: 4/5.  LLE: 5/5.  -  Tone:  Normal.   -  Sensory:  Intact to light touch and pin prick.   Skin: Skin is warm and dry. No rash noted.   Psychiatric: He has a normal mood and affect. His behavior is normal. Thought content normal.   Vitals reviewed.       Diagnostic Results:   Labs: Reviewed  CT: Reviewed  MRI: Reviewed

## 2017-07-27 NOTE — PT/OT/SLP EVAL
Speech Language Pathology  Evaluation    Michael Alonzo   MRN: [id number]   Admitting Diagnosis: CVA (cerebral vascular accident)    Diet recommendations: Solid Diet Level: Regular  Liquid Diet Level: Thin Feed only when awake/alert, HOB to 90 degrees, Small bites/sips and Alternating bites/sips    SLP Treatment Date: 07/27/17  Speech Start Time: 0754     Speech Stop Time: 0802     Speech Total (min): 8 min       TREATMENT BILLABLE MINUTES:  Treatment Swallowing Dysfunction 8    Diagnosis: CVA (cerebral vascular accident)      Past Medical History:   Diagnosis Date    Arthritis     High cholesterol      Past Surgical History:   Procedure Laterality Date    SPINAL FIXATION SURGERY         Has the patient been evaluated by SLP for swallowing? : Yes  Keep patient NPO?: No   General Precautions: Standard, fall          Social Hx: Patient lives with spouse.    Prior diet: regular/thin.      Subjective:  Awake/alert  Pain/Comfort  Pain Rating 1: 0/10  Pain Rating Post-Intervention 1: 0/10    Objective:        Oral Musculature Evaluation  Oral Musculature: WFL  Dentition: present and adequate  Mucosal Quality: good  Mandibular Strength and Mobility: WFL  Oral Labial Strength and Mobility: WFL  Lingual Strength and Mobility: WFL  Volitional Cough: adequate  Volitional Swallow: adequate  Voice Prior to PO Intake: clear     Bedside Swallow Eval:  Consistencies Assessed: Thin liquids x4 oz cup/straw, Puree x2 and Solids x3  Oral Phase: WFL  Pharyngeal Phase: no overt clinical  signs/symptoms of aspiration           Assessment:  Michael Alonzo is a 42 y.o. male with a medical diagnosis of CVA (cerebral vascular accident)    Goals:    SLP Goals        Problem: SLP Goal    Goal Priority Disciplines Outcome   SLP Goal     SLP    Description:  Speech Language Pathology Goals  Goals expected to be met by 8/3    1. Pt will tolerate regular diet/thin liquids  2. Pt will participate in speech language cognitive eval                      Plan:   Patient to be seen Therapy Frequency: 5 x/week   Plan of Care expires: 08/25/17  Plan of Care reviewed with: patient  SLP Follow-up?: Yes         SLP G-Codes  Functional Assessment Tool Used: NOMS  Score: 6  Functional Limitations: Swallowing  Swallow Current Status (): CI  Swallow Goal Status (): EYAL Escobar CCC-SLP   Speech Language Pathologist  Pager [phone]  07/27/2017

## 2017-07-27 NOTE — ASSESSMENT & PLAN NOTE
-  Recent admission for MRI negative L MCA stroke s/p tPA  -  Presented with worsening dysarthria and R weakness and numbness   -  CTH negative, MRI pending    Functional status: evaluations pending  Cognitive/Speech/Language status:  SLP evaluation pending   Nutrition/Swallow Status:  Regular diet and thin liquids    Recommendations  -  Encourage mobility, OOB in chair at least 3 hours per day, and early ambulation as appropriate   -  PT/OT evaluate and treat  -  SLP speech and cognitive evaluate and treat  -  Monitor sleep disturbances and establish consistent sleep-wake cycle  -  Monitor for bowel and bladder dysfunction  -  Monitor for shoulder pain and subluxation  -  Monitor for spasticity  -  Monitor for and prevent skin breakdown and pressure ulcers  · Early mobility, repositioning/weight shifting every 20-30 minutes when sitting, turn patient every 2 hours, proper mattress/overlay and chair cushioning, pressure relief/heel protector boots  -  DVT prophylaxis:  Heparin scheduled   -  Reviewed discharge options (IP rehab, SNF, HH therapy, and OP therapy)

## 2017-07-27 NOTE — HPI
Michael Alonzo is a 42-year-old male with PMHx of HLD, hypothyroidism, arthritis, chronic pain s/p several spinal surgeries, and recent admission (7/14/17-7/15/17)for MRI negative L MCA stroke s/p tPA.  Patient presented to [facility] with worsening dysarthria and right sided weakness and numbness.  CTH without acute pathology.  Telemedicine consult completed.  Presentation concerning for L MCA stroke vs conversion.  Not tPA candidate 2/2 being outside of the treatment window.  Transferred to [facility] on 7/26/17 for further evaluation and management.    Functional History: Patient lives in [place], [place], with family in a single story home with 3 steps to enter.  Prior to admission, he was independent with ADLs, including driving, and mobility.  He is right handed.  DME: none.

## 2017-07-27 NOTE — CONSULTS
[facility]  Physical Medicine & Rehab  Consult Note    Patient Name: Michael Alonzo  MRN: [id number]  Admission Date: 7/26/2017  Hospital Length of Stay: 1 days  Attending Physician: Dennis Peterson MD   Collaborating Physician: Rik Pettit MD    Inpatient consult to Physical Medicine & Rehabilitation  Consult performed by: Lisa Randle NP  Consult requested by:  Dennis Peterson MD    Reason for Consult:  assess rehabilitation needs    Consults  Subjective:     Principal Problem: CVA (cerebral vascular accident)    HPI: Michael Alonzo is a 42-year-old male with PMHx of HLD, hypothyroidism, arthritis, chronic pain s/p several spinal surgeries, and recent admission (7/14/17-7/15/17)for MRI negative L MCA stroke s/p tPA.  Patient presented to [facility] with worsening dysarthria and right sided weakness and numbness.  CTH without acute pathology.  Telemedicine consult completed.  Presentation concerning for L MCA stroke vs conversion.  Not tPA candidate 2/2 being outside of the treatment window.  Transferred to [facility] on 7/26/17 for further evaluation and management.    Functional History: Patient lives in [place], [place], with family in a single story home with 3 steps to enter.  Prior to admission, he was independent with ADLs, including driving, and mobility.  He is right handed.  DME: none.    Hospital Course: Therapy evaluations pending.  MRI pending.     Past Medical History:   Diagnosis Date    Arthritis     High cholesterol      Past Surgical History:   Procedure Laterality Date    SPINAL FIXATION SURGERY       Review of patient's allergies indicates:   Allergen Reactions    Tramadol Anaphylaxis    Tomato (solanum lycopersicum) Hives       Scheduled Medications:    aspirin  325 mg Oral Daily    heparin (porcine)  5,000 Units Subcutaneous Q8H    levothyroxine  25 mcg Oral Before breakfast    nicotine  1 patch Transdermal Daily    oxycodone  15 mg Oral Q8H    rosuvastatin   40 mg Oral QHS    sodium chloride 0.9%  3 mL Intravenous Q8H       PRN Medications: labetalol, midazolam (PF), sodium chloride 0.9%    Family History     Family history is unknown by patient.        Social History Main Topics    Smoking status: Current Every Day Smoker     Packs/day: 0.50     Types: Cigarettes    Smokeless tobacco: Never Used    Alcohol use No    Drug use: No    Sexual activity: Yes     Partners: Female     Review of Systems   Constitutional: Negative for chills, fatigue and fever.   HENT: Negative for drooling, hearing loss, trouble swallowing and voice change.    Eyes: Negative for pain and visual disturbance.   Respiratory: Negative for cough, shortness of breath and wheezing.    Cardiovascular: Negative for chest pain and palpitations.   Gastrointestinal: Negative for abdominal pain, nausea and vomiting.   Genitourinary: Negative for difficulty urinating and flank pain.   Musculoskeletal: Positive for arthralgias, back pain and myalgias. Negative for neck pain.   Skin: Negative for rash and wound.   Neurological: Positive for weakness. Negative for dizziness, numbness and headaches.   Psychiatric/Behavioral: Negative for agitation and hallucinations. The patient is not nervous/anxious.      Objective:     Vital Signs (Most Recent):  Temp: 97.9 °F (36.6 °C) (07/27/17 0800)  Pulse: 65 (07/27/17 1254)  Resp: 18 (07/27/17 1254)  BP: (!) 140/79 (07/27/17 1254)  SpO2: 98 % (07/27/17 0800)    Vital Signs (24h Range):  Temp:  [97.4 °F (36.3 °C)-98.1 °F (36.7 °C)] 97.9 °F (36.6 °C)  Pulse:  [58-75] 65  Resp:  [18] 18  SpO2:  [94 %-98 %] 98 %  BP: (106-140)/(52-81) 140/79     Body mass index is 30.04 kg/m².    Physical Exam   Constitutional: He appears well-developed and well-nourished. No distress.   Appears older than stated age   HENT:   Head: Normocephalic and atraumatic.   Right Ear: External ear normal.   Left Ear: External ear normal.   Nose: Nose normal.   Eyes: Right eye exhibits no  discharge. Left eye exhibits no discharge. No scleral icterus.   Neck: Normal range of motion.   Cardiovascular: Normal rate, regular rhythm and intact distal pulses.    Pulmonary/Chest: Effort normal. No respiratory distress. He has no wheezes.   Abdominal: Soft. He exhibits no distension. There is no tenderness.   Musculoskeletal: Normal range of motion. He exhibits no edema or tenderness.   Neurological:   -  Mental Status:  AAOx3.  Follows commands.  Answers correct age and .  Recent and remote memory intact.  -  Speech and language:  no aphasia or dysarthria.    -  Vision:  no hemianopsia or ptosis.    -  Facial movement (CN VII): symmetrical  -  Motor:  No pronator drift. RUE: 4/5.  LUE: 5/5.  RLE: 4/5.  LLE: 5/5.  -  Tone:  Normal.   -  Sensory:  Intact to light touch and pin prick.   Skin: Skin is warm and dry. No rash noted.   Psychiatric: He has a normal mood and affect. His behavior is normal. Thought content normal.   Vitals reviewed.    Diagnostic Results:   Labs: Reviewed  CT: Reviewed  MRI: Reviewed    Assessment/Plan:     * CVA (cerebral vascular accident)    -  Recent admission for MRI negative L MCA stroke s/p tPA  -  Presented with worsening dysarthria and R weakness and numbness   -  CTH negative, MRI pending    Functional status: evaluations pending  Cognitive/Speech/Language status:  SLP evaluation pending   Nutrition/Swallow Status:  Regular diet and thin liquids    Recommendations  -  Encourage mobility, OOB in chair at least 3 hours per day, and early ambulation as appropriate   -  PT/OT evaluate and treat  -  SLP speech and cognitive evaluate and treat  -  Monitor sleep disturbances and establish consistent sleep-wake cycle  -  Monitor for bowel and bladder dysfunction  -  Monitor for shoulder pain and subluxation  -  Monitor for spasticity  -  Monitor for and prevent skin breakdown and pressure ulcers  · Early mobility, repositioning/weight shifting every 20-30 minutes when sitting,  turn patient every 2 hours, proper mattress/overlay and chair cushioning, pressure relief/heel protector boots  -  DVT prophylaxis:  Heparin scheduled   -  Reviewed discharge options (IP rehab, SNF, HH therapy, and OP therapy)        Therapy evaluations pending.  Will follow and discuss with rehab team for final rehab recommendation.    Thank you for your consult.     CATHERINE Marie  Department of Physical Medicine & Rehab  [facility]

## 2017-07-27 NOTE — HPI
42 Year old male who was discharged on 7/15/17 for tPA aborted, MRI negative thrombotic L MCA re-presented to [facility] today with worsening of prior stroke symptoms. Reported that at 1:30 am he awoke with symptoms of slurred speech, right sided numbness and right sided weakness worse than at discharge. The patient was seen via telestroke and was transferred here for stroke work up and care.   No identified triggers, aggravators or relievers     The patient reports no new vision changes but endorses chronic back and shoulder pain for which he takes roxicodone.

## 2017-07-27 NOTE — PT/OT/SLP EVAL
Occupational Therapy  Evaluation/Discharge     Michael Alonzo   MRN: [id number]   Admitting Diagnosis: Right sided weakness    OT Date of Treatment: 07/27/17   OT Start Time: 1353  OT Stop Time: 1400  OT Total Time (min): 7 minutes    Billable Minutes:  Evaluation 13 minutes    Diagnosis: Right sided weakness       Past Medical History:   Diagnosis Date    Arthritis     High cholesterol       Past Surgical History:   Procedure Laterality Date    SPINAL FIXATION SURGERY       General Precautions: Standard, fall  Orthopedic Precautions: N/A  Braces: N/A    Patient History:  Living Environment  Lives With: spouse  Living Arrangements: house  Home Accessibility: stairs to enter home  Number of Stairs to Enter Home: 3  Transportation Available: family or friend will provide  Living Environment Comment: Pt lives in a [facility] w/ spouse and son. PLOF indep for ADL's and mod i for mobility (SPC)  Equipment Currently Used at Home: none    Prior level of function:   Bed Mobility/Transfers: independent  Grooming: independent  Bathing: independent  Upper Body Dressing: independent  Lower Body Dressing: independent  Toileting: independent  Home Management Skills: independent        Dominant hand: right    Subjective:  Communicated with nurse prior to session.  Pt agreeable to skilled OT services.  Chief Complaint: no complaints   Patient/Family stated goals: return home    Pain/Comfort  Pain Rating 1: 7/10  Location - Side 1: Right  Location - Orientation 1: generalized  Location 1: shoulder  Pain Addressed 1: Distraction  Pain Rating Post-Intervention 1: 7/10    Objective:  Patient found with: peripheral IV  Pt received w/ HOB elevated.    Cognitive Exam:  Oriented to: Person, Place, Time and Situation  Follows Commands/attention: Follows multistep  commands  Communication: clear/fluent  Memory:  No Deficits noted  Safety awareness/insight to disability: intact  Coping skills/emotional control: Appropriate to  "situation    Visual/perceptual:  Intact    Physical Exam:  Postural examination/scapula alignment: Rounded shoulder  Skin integrity: Visible skin intact  Edema: None noted     Sensation:   Intact    Upper Extremity Range of Motion:  Right Upper Extremity: WFL  Left Upper Extremity: WFL    Upper Extremity Strength:  Right Upper Extremity: WFL  R: 4-/5; L:5/5  Left Upper Extremity: WFL   Strength: WFL    Fine motor coordination:   Intact    Gross motor coordination: WFL    Functional Mobility:    Transfers:  Sit <> Stand Assistance: Stand By Assistance  Sit <> Stand Assistive Device: No Assistive Device    Functional Ambulation: Pt ambulated 15 ft indep w/o AD at [facility]. Pt handed off to PT.    Activities of Daily Living:  LE Dressing Level of Assistance: Independent (Donned/doffed socks)  Grooming Position: Standing at sink  Grooming Level of Assistance: Supervision (facial hygiene)    Balance:   Static Sit: GOOD: Takes MODERATE challenges from all directions  Dynamic Sit: GOOD: Maintains balance through MODERATE excursions of active trunk movement  Static Stand: GOOD-: Takes MODERATE challenges from all directions inconsistently  Dynamic stand: GOOD-: Needs SUPERVISION only during gait and able to self right with moderate     Therapeutic Activities and Exercises:  Pt educated on POC.    AM-PAC 6 CLICK ADL  How much help from another person does this patient currently need?  1 = Unable, Total/Dependent Assistance  2 = A lot, Maximum/Moderate Assistance  3 = A little, Minimum/Contact Guard/Supervision  4 = None, Modified [place]/Independent    Putting on and taking off regular lower body clothing? : 3  Bathing (including washing, rinsing, drying)?: 4  Toileting, which includes using toilet, bedpan, or urinal? : 4  Putting on and taking off regular upper body clothing?: 4  Taking care of personal grooming such as brushing teeth?: 4  Eating meals?: 4  Total Score: 23    AM-PAC Raw Score CMS "G-Code Modifier Level " of Impairment Assistance   6 % Total / Unable   7 - 9 CM 80 - 100% Maximal Assist   10-14 CL 60 - 80% Moderate Assist   15 - 19 CK 40 - 60% Moderate Assist   20 - 22 CJ 20 - 40% Minimal Assist   23 CI 1-20% SBA / CGA   24 CH 0% Independent/ Mod I       Patient left seated EOB with PT and family present    Assessment:  Michael Alonzo is a 42 y.o. male with a medical diagnosis of Right sided weakness and presents with Impairments listed below. Pt is not currently displaying a need for acute OT services. D/C acute OT services and recommend pt D/C home.    Rehab identified problem list/impairments: Rehab identified problem list/impairments: impaired endurance, impaired functional mobilty, impaired balance    Rehab potential is good.    Activity tolerance: Good    Discharge recommendations: Discharge Facility/Level Of Care Needs: home     Barriers to discharge: Barriers to Discharge: Inaccessible home environment    Equipment recommendations: none     GOALS:    Occupational Therapy Goals        Problem: Occupational Therapy Goal    Goal Priority Disciplines Outcome Interventions   Occupational Therapy Goal     OT, PT/OT     Description:  is currently performing ADLs, functional mobility & t/fs without assistance and displays age-appropriate strength, endurance & balance. OT services are not recommended at this time and patient is safe to D/C home.                     PLAN:  Patient to be seen  (D/C acute OT services ) to address the above listed problems via    Plan of Care expires:    Plan of Care reviewed with: patient, spouse, son         Marquez Fung, OT  07/27/2017

## 2017-07-27 NOTE — ED PROVIDER NOTES
Encounter Date: 7/26/2017       History     Chief Complaint   Patient presents with    Aphasia     onset at 4:30am this morning, noted by wife. Hx of CVA     Patient's 42-year-old male presenting to the emergency Department with complaints of acute aphasia, right upper and right lower extremity weakness that has acutely worsened since 2 hours prior to arrival, 4:30 AM.  He was discharged from [facility] 3 days prior after what was suspected to be a left MCA aborted thromboembolic stroke after the patient receive TPA.  At that time he was able to speak and had only some residual right upper and lower extremity deficit.  They're unsure if the patient had some worsening weakness prior to going to sleep last night.          Review of patient's allergies indicates:   Allergen Reactions    Tramadol Anaphylaxis    Tomato (solanum lycopersicum) Hives     Past Medical History:   Diagnosis Date    Arthritis     High cholesterol      Past Surgical History:   Procedure Laterality Date    SPINAL FIXATION SURGERY       History reviewed. No pertinent family history.  Social History   Substance Use Topics    Smoking status: Current Every Day Smoker     Packs/day: 0.50     Types: Cigarettes    Smokeless tobacco: Never Used    Alcohol use No     Review of Systems   Unable to perform ROS: Patient nonverbal       Physical Exam     Initial Vitals [07/26/17 0640]   BP Pulse Resp Temp SpO2   119/77 84 16 97.2 °F (36.2 °C) 97 %      MAP       91         Physical Exam    Nursing note and vitals reviewed.  Constitutional: He appears well-nourished. No distress.   HENT:   Head: Normocephalic and atraumatic.   Eyes: Conjunctivae and EOM are normal.   Neck: Normal range of motion. Neck supple.   Cardiovascular: Normal rate and regular rhythm.   Pulmonary/Chest: Breath sounds normal. No respiratory distress.   Abdominal: He exhibits no distension. There is tenderness.   Chronic right abdominal tenderness   Musculoskeletal: He  exhibits no edema.   Neurological: He is alert and oriented to person, place, and time.   Expressive aphasia with right upper and right lower extremity deficit, normal alertness and mentation    Skin: Skin is warm and dry. Capillary refill takes less than 2 seconds.   Psychiatric: He has a normal mood and affect. Thought content normal.         ED Course   Procedures  Labs Reviewed   CBC W/ AUTO DIFFERENTIAL - Abnormal; Notable for the following:        Result Value    MPV 7.9 (*)     All other components within normal limits   TSH - Abnormal; Notable for the following:     TSH 7.330 (*)     All other components within normal limits   LIPID PANEL - Abnormal; Notable for the following:     HDL 35 (*)     All other components within normal limits   COMPREHENSIVE METABOLIC PANEL   PROTIME-INR   T4, FREE             Medical Decision Making:   Initial Assessment:   Stroke alert was called upon arrival.  There is acute recurrence of similar deficits experienced earlier in the week.  Labs will be obtained.  Tele stroke consultation obtained.  Case was signed over to Dr. Vasquez did speak with the on-call neurologist.  Immediate non-con CT was negative for bleed.  ED Management:  Labs are found be unremarkable.  Dr. Vasquez spoke with the accepting neurologist at [facility] who requested the patient be transferred there.  He was transferred in guarded condition.              Attending Attestation:         Attending Critical Care:   Critical Care Times:   Direct Patient Care (initial evaluation, reassessments, and time considering the case)................................................................10 minutes.   Additional History from reviewing old medical records or taking additional history from the family, EMS, PCP, etc.......................5 minutes.   Ordering, Reviewing, and Interpreting Diagnostic  Studies...............................................................................................................5 minutes.   Documentation..................................................................................................................................................................................5 minutes.   Consultation with other Physicians. .................................................................................................................................................5 minutes.   Consultation with the patient's family directly relating to the patient's condition, care, and DNR status (when patient unable)......5 minutes.   Other..................................................................................................................................................................................................5 minutes.   ==============================================================  · Total Critical Care Time - exclusive of procedural time: 40 minutes.  ==============================================================  Critical care was necessary to treat or prevent imminent or life-threatening deterioration of the following conditions: stroke.   The following critical care procedures were done by me (see procedure notes): pulse oximetry.   Critical care was time spent personally by me on the following activities: obtaining history from patient or relative, examination of patient, review of old charts, ordering lab, x-rays, and/or EKG, development of treatment plan with patient or relative, discussions with primary provider, discussion with consultants and re-evaluation of patient's conition.   Critical Care Condition: life-threatening               ED Course   Comment By Time   Spoke with neurovascular teleconference who recommended no tPA and txf back to [facility] for readmission given possible recurrent CVA and just d/c from there. Justin Vasquez MD  07/26 0708   Family update re: current plan for txf to [facility] by EMS at recommendation of vascular neurology service.   Justin Vasquez MD 07/26 1765   CT-H:  NAD. (rad report)  CXR:  NAD. (my read) Justin Vasquez MD 07/26 1597     Clinical Impression:   The encounter diagnosis was Cerebrovascular accident (CVA), unspecified mechanism.    Disposition:   Disposition: Transferred  Condition: Serious                        Aj Degroot MD  07/27/17 0056

## 2017-07-27 NOTE — PLAN OF CARE
07/27/2017      Michael Alonzo  [address]  [place] [address]          Vascular Neurology Dept.  [facility] [address] [place] [address]   Diagnosis:  Right sided weakness                         Debility      Ambulatory referral to outpatient Physical Therapy: Evaluate and Treat        __________________________  Sincere Castro MD  07/27/2017

## 2017-07-27 NOTE — DISCHARGE SUMMARY
[facility]  Vascular Neurology  Comprehensive Stroke Center  Discharge Summary     Summary:     Admit Date: 2017  8:13 PM    Discharge Date and Time:  2017     Attending Physician: Dennis Peterson MD     Discharge Provider: Sincere Castro MD    History of Present Illness: 42 Year old male who was discharged on 7/15/17 for tPA aborted, MRI negative thrombotic L MCA re-presented to [facility] today with worsening of prior stroke symptoms. Reported that at 1:30 am he awoke with symptoms of slurred speech, right sided numbness and right sided weakness worse than at discharge. The patient was seen via telestroke and was transferred here for stroke work up and care.   No identified triggers, aggravators or relievers     The patient reports no new vision changes but endorses chronic back and shoulder pain for which he takes roxicodone.       Hospital Course (synopsis of major diagnoses, care, treatment, and services provided during the course of the hospital stay):  - 42 year old male transferred for stroke work up after telemedicine consult for worsening L hemispheric symptoms.    - Discharge to home with ambulatory psychology referral.  Unable to obtain MRI 2/2 anxiety/claustrophobia.  Significant home stressors, pt admits may be contributing to symptoms.    NIH Stroke Scale:  Interval: 7 days or at discharge (whichever comes first)  Level of Consciousness: 0 - alert  LOC Questions: 0 - answers both correctly  LOC Commands: 0 - performs both correctly  Best Gaze: 0 - normal  Visual: 0 - no visual loss  Facial Palsy: 0 - normal  Motor Left Arm: 0 - no drift  Motor Right Arm: 0 - no drift  Motor Left Le - no drift  Motor Right Le - no drift  Limb Ataxia: 0 - absent  Sensory: 0 - normal  Best Language: 0 - no aphasia  Dysarthria: 0 - normal articulation  Extinction and Inattention: 0 - no neglect  NIH Stroke Scale Total: 0  Ubaldo Coma Scale:  Best Eye Response: 4 -  spontaneous  Best Motor Response: 6 - obeys commands  Best Verbal Response: 5 - oriented  Ubaldo Coma Scale Total: 15  Modified Mathews Scale:   Timeline: At discharge  Modified Kelly Score: 4 - moderately severe disability (requires cane at baseline (before recent admission) to ambulate)    ABCD2 Scale for TIA:   Age > or = 60: 0 - no  B/P or = 140/9 at Initial Evaluation: 0 - no  Clinical Features of TIA: 2 - unilateral weakness  Duration of Symptoms: 2 - > or equal to 60 minutes  Diabetes Mellitus in History: 0 - no  ABCD2 Scale Total: 4          Assessment/Plan:     Interventions: None    Complications: None    Research Candidate?:  No    Neurological deficit at discharge: None     Disposition: Home or Self Care    Final Active Diagnoses:    Diagnosis Date Noted POA    PRINCIPAL PROBLEM:  Right sided weakness [R53.1] 07/26/2017 Yes    Anxiety [F41.9] 07/27/2017 Unknown    Mixed hyperlipidemia [E78.2] 07/14/2017 Yes    Current every day smoker [F17.200] 07/14/2017 Yes      Problems Resolved During this Admission:    Diagnosis Date Noted Date Resolved POA     * Right sided weakness    Mr. Alonzo is a 42 Year old male who was discharged on 7/15/17 for tPA aborted, MRI negative thrombotic L MCA re-presented to [facility] today with worsening of prior stroke symptoms. Reported that on 7/26 at 1:30 am he awoke with symptoms of slurred speech, right sided numbness and right sided weakness worse than at discharge. The patient was seen via telestroke and was transferred here for stroke workup.    Differential includes: Stroke, TIA, MS, conversion/psychogenic etiology.  Unable to tolerate MRI after two attempts, even with anxiolytics.  Significant home stressors regarding a female individual staying with the pt and family.  The stress of which pt admits may be contributing to his symptoms.  See separate plan of care note dated 7/27 for further details.     Qdaily  Rosuvastatin 40 Qdaily  Nicotine  patch; smoking cessation education/referral  PT/OT/ST -> Outpt PT  Psychology referral -> Anxiety, conversion disorder?  Work on coping methods for home stressors.    Discharge to home on , Rosuvastatin 40.  Psychology, smoking cessation education, and outpt PT referrals.  F/u with PCP in 1 week, vascular neurology in 4-6 weeks.          Anxiety    Possible conversion disorder with home stressors manifesting as R-sided weakness.  -Extensive discussion regarding minimizing and alleviating home stressors  -Ambulatory referral to psychology        Current every day smoker    Stroke risk factor  - Nicotine patch   - Smoking cessation education/referral        Mixed hyperlipidemia    Stroke risk factor  -.8  -Continue rosuvastatin 40 QHS (recently started)            Recommendations:     Post-discharge complication risks: None    Stroke Education given to: patient    Follow-up in Stroke Clinic in 30 days    Discharge Plan:  Antithrombotics: Aspirin 325 mg  Statin: Rosuvastatin 40 mg  Smoking Cessation  Aggresive risk factor modification:  Smoking, High Cholesterol, Diet and Exercise    Follow Up:  Follow-up Information     Jeannie Rendon PA-C On 8/24/2017.    Specialty:  Neurology  Why:  @ 11:20  Contact information:  [address]  [facility] [address]  [phone]             José Miguel Chapman MD In 1 week.    Specialty:  Family Medicine  Why:  Right-sided Weakness f/u  Contact information:  [address] [facility] [place] [address]  [phone]             [facility] VASCULAR NEUROLOGY In 4 weeks.    Specialty:  Vascular Neurology  Why:  Stroke f/u  Contact information:  [address]  [facility] [address]  [phone]               Patient Instructions:     Referral to OutPatient  Physical therapy   Referral Priority: Routine Referral Type: Physical Medicine   Referral Reason: Specialty Services Required    Requested Specialty: Physical Therapy    Number of Visits Requested: 1      Ambulatory  referral to Smoking Cessation Program   Referral Priority: Routine Referral Type: Consultation   Referral Reason: Specialty Services Required    Requested Specialty: CTTS    Number of Visits Requested: 1      Ambulatory Referral to Vascular Neurology   Referral Priority: Routine Referral Type: Consultation   Referral Reason: Specialty Services Required    Requested Specialty: Vascular Neurology    Number of Visits Requested: 1      Ambulatory Referral to Psychology   Referral Priority: Routine Referral Type: Psychiatric   Referral Reason: Specialty Services Required    Requested Specialty: Psychology    Number of Visits Requested: 1      Diet Cardiac   Order Comments: See Stroke Patient Education Guide Booklet for details.     Call 911 for any of the following:   Order Comments: Call 911  right away if any of the following warning signs come on suddenly, even if the symptoms only last for a few minutes. With stroke, timing is very important.   - Warning Signs of Stroke:  - Weakness: You may feel a sudden weakness, tingling or loss of feeling on one side of your face or body.  - Vision Problems: You may have sudden double vision or trouble seeing in one or both eyes.  - Speech Problems: You may have sudden trouble talking, slured speech, or problems understanding others.  - Headache: You may have sudden, severe headache.  - Movement Problems: You may experience dizziness, a feeling of spinning, a loss of balance, a feeling of falling or blackouts.     Activity as tolerated     Call MD for:  persistent nausea and vomiting or diarrhea     Call MD for:  redness, tenderness, or signs of infection (pain, swelling, redness, odor or green/yellow discharge around incision site)     Call MD for:  severe persistent headache     Call MD for:  persistent dizziness, light-headedness, or visual disturbances     Call MD for:  increased confusion or weakness       Medications:  Reconciled Home Medications:   Current Discharge  Medication List      START taking these medications    Details   nicotine (NICODERM CQ) 21 mg/24 hr Place 1 patch onto the skin once daily.  Refills: 0         CONTINUE these medications which have NOT CHANGED    Details   diazePAM (VALIUM) 10 MG Tab Take 10 mg by mouth nightly as needed.      gabapentin (NEURONTIN) 600 MG tablet Take 600 mg by mouth 3 (three) times daily. 1 tablet QAM, 1 tablet at noon, 2 tablets QPM, and 2 tablets QHS      LEVOTHYROXINE SODIUM (SYNTHROID ORAL) Take 25 mcg by mouth once daily at 6am.       oxycodone (ROXICODONE) 15 MG Tab Take 15 mg by mouth 4 (four) times daily.       rosuvastatin (CRESTOR) 40 MG Tab Take 1 tablet (40 mg total) by mouth once daily.  Qty: 30 tablet, Refills: 1      aspirin 325 MG tablet Take 1 tablet (325 mg total) by mouth once daily.  Refills: 0             Sincere Castro MD  Comprehensive Stroke Center  Department of Vascular Neurology   [facility]

## 2017-07-27 NOTE — ASSESSMENT & PLAN NOTE
Possible conversion disorder with home stressors manifesting as R-sided weakness.  -Extensive discussion regarding minimizing and alleviating home stressors  -Ambulatory referral to psychology

## 2017-07-27 NOTE — PROGRESS NOTES
Pt arrived to MRI via stretcher, alert on room air and continuous telemetry. Changed to MRI compatible telemetry and informed centralized monitoring.

## 2017-07-27 NOTE — ASSESSMENT & PLAN NOTE
Prior history of tpa aborted MRI negative stroke  Now with worsening symptoms  Continue asa 325 and crestor 40 mg daily   Nicotine patch   PT/OT/ speech   bp to be kept below 220/110  Hep vte.     MRI, MRA, elpidio screen

## 2017-07-27 NOTE — SUBJECTIVE & OBJECTIVE
NIH Stroke Scale:  Interval: 7 days or at discharge (whichever comes first)  Level of Consciousness: 0 - alert  LOC Questions: 0 - answers both correctly  LOC Commands: 0 - performs both correctly  Best Gaze: 0 - normal  Visual: 0 - no visual loss  Facial Palsy: 0 - normal  Motor Left Arm: 0 - no drift  Motor Right Arm: 0 - no drift  Motor Left Le - no drift  Motor Right Le - no drift  Limb Ataxia: 0 - absent  Sensory: 0 - normal  Best Language: 0 - no aphasia  Dysarthria: 0 - normal articulation  Extinction and Inattention: 0 - no neglect  NIH Stroke Scale Total: 0  Ubaldo Coma Scale:  Best Eye Response: 4 - spontaneous  Best Motor Response: 6 - obeys commands  Best Verbal Response: 5 - oriented  [place] Coma Scale Total: 15  Modified [place] Scale:   Timeline: At discharge  Modified [place] Score: 4 - moderately severe disability (requires cane at baseline (before recent admission) to ambulate)    ABCD2 Scale for TIA:   Age > or = 60: 0 - no  B/P or = 140/9 at Initial Evaluation: 0 - no  Clinical Features of TIA: 2 - unilateral weakness  Duration of Symptoms: 2 - > or equal to 60 minutes  Diabetes Mellitus in History: 0 - no  ABCD2 Scale Total: 4

## 2017-07-27 NOTE — ASSESSMENT & PLAN NOTE
Mr. Alonzo is a 42 Year old male who was discharged on 7/15/17 for tPA aborted, MRI negative thrombotic L MCA re-presented to [facility] today with worsening of prior stroke symptoms. Reported that on 7/26 at 1:30 am he awoke with symptoms of slurred speech, right sided numbness and right sided weakness worse than at discharge. The patient was seen via telestroke and was transferred here for stroke workup.    Differential includes: Stroke, TIA, MS, conversion/psychogenic etiology.  Unable to tolerate MRI after two attempts, even with anxiolytics.  Significant home stressors regarding a female individual staying with the pt and family.  The stress of which pt admits may be contributing to his symptoms.  See separate plan of care note dated 7/27 for further details.     Qdaily  Rosuvastatin 40 Qdaily  Nicotine patch; smoking cessation education/referral  PT/OT/ST -> Outpt PT  Psychology referral -> Anxiety, conversion disorder?  Work on coping methods for home stressors.    Discharge to home on , Rosuvastatin 40.  Psychology, smoking cessation education, and outpt PT referrals.  F/u with PCP in 1 week, vascular neurology in 4-6 weeks.

## 2017-07-27 NOTE — PLAN OF CARE
Problem: Physical Therapy Goal  Goal: Physical Therapy Goal  Outcome: Ongoing (interventions implemented as appropriate)  PT anh completed.  Brandi Bernabe, PT  7/27/2017

## 2017-07-27 NOTE — SUBJECTIVE & OBJECTIVE
Past Medical History:   Diagnosis Date    Arthritis     High cholesterol      Past Surgical History:   Procedure Laterality Date    SPINAL FIXATION SURGERY       No family history on file.  Social History   Substance Use Topics    Smoking status: Current Every Day Smoker     Packs/day: 0.50     Types: Cigarettes    Smokeless tobacco: Never Used    Alcohol use No     Review of patient's allergies indicates:   Allergen Reactions    Tramadol Anaphylaxis    Tomato (solanum lycopersicum) Hives     Medications: I have reviewed the current medication administration record.    No prescriptions prior to admission.       Review of Systems   Constitutional: Negative for chills and fever.   HENT: Negative for facial swelling.    Eyes: Negative for visual disturbance.   Respiratory: Positive for cough.         (+) smoker   Cardiovascular: Negative for leg swelling.   Gastrointestinal: Negative for abdominal distention.   Musculoskeletal: Positive for back pain (chronic ).   Skin: Negative for color change.   Neurological: Positive for speech difficulty and weakness.   Psychiatric/Behavioral: Negative for agitation.     Objective:     Vital Signs (Most Recent):       Vital Signs Range (Last 24H):  Temp:  [97.2 °F (36.2 °C)-98.1 °F (36.7 °C)]   Pulse:  [59-84]   Resp:  [16-17]   BP: (105-122)/(52-83)   SpO2:  [94 %-98 %]     Physical Exam   Constitutional: He is oriented to person, place, and time. He appears well-developed and well-nourished.   HENT:   Head: Normocephalic and atraumatic.   Eyes: Pupils are equal, round, and reactive to light.   Cardiovascular: Normal rate.    Pulmonary/Chest: Effort normal.   Abdominal: Soft.   Musculoskeletal: He exhibits no edema.   Neurological: He is alert and oriented to person, place, and time. GCS eye subscore is 4 - spontaneous. GCS verbal subscore is 5 - oriented. GCS motor subscore is 6 - obeys commands.   Skin: Skin is warm and dry.   Psychiatric: He has a normal mood and  affect.   Nursing note and vitals reviewed.      Neurological Exam:   LOC: alert and follows requests  Language: No aphasia  Speech: Dysarthria, per wife  Orientation: Person, Place, Time  Memory: Recent memory intact, Remote memory intact, Age correct, Month correct  Visual Fields (CN II): Full  EOM (CN III, IV, VI): Full/intact  Oculocephalics: normal  Pupils (CN III, IV, VI): PERRL  Facial Sensation (CN V): Symmetric  Facial Movement (CN VII): symmetric facial expression  Hearing (CN VIII): intact bilaterally  Gag Reflex (CN IX, X): normal/symmetric  Shoulder/Neck (CN XI): SCM-Left: Normal ; SCM-Right: weak, reported limited by pain also ; Shoulder Shrug: Weak:  right  Motor*: Arm Left:  Normal (5/5), Leg Left:   Normal (5/5), Arm Right:   Paretic:  3/5, Leg Right:   Paretic:  3/5  Cerebellar*: Not testable due to weakness on right  Sensation: intact to light touch but reports less sensation on right  Tone: Arm-Left: normal; Leg-Left: normal; Arm-Right: normal; Leg-Right: normal    NIH Stroke Scale:    Level of Consciousness: 0 - alert  LOC Questions: 0 - answers both correctly  LOC Commands: 0 - performs both correctly  Best Gaze: 0 - normal  Visual: 0 - no visual loss  Facial Palsy: 0 - normal  Motor Left Arm: 0 - no drift  Motor Right Arm: 1 - drift  Motor Left Le - no drift  Motor Right Le - drift  Limb Ataxia: 0 - absent  Sensory: 1 - mild to moderate loss  Best Language: 0 - no aphasia  Dysarthria: 1 - mild to moderate dysarthria  Extinction and Inattention: 0 - no neglect  NIH Stroke Scale Total: 4  [place] Coma Scale:  Best Eye Response: 4 - spontaneous  Best Motor Response: 6 - obeys commands  Best Verbal Response: 5 - oriented  [place] Coma Scale Total: 15  Modified [place] Scale:   Timeline: Prior to symptoms onset  Modified Kelly Score: 2 - slight disability        Laboratory:  CMP:   Recent Labs  Lab 17  0648   CALCIUM 9.3   ALBUMIN 3.7   PROT 7.1      K 3.9   CO2 24       BUN 13   CREATININE 1.1   ALKPHOS 89   ALT 43   AST 28   BILITOT 0.4     CBC:   Recent Labs  Lab 07/26/17  0648   WBC 7.40   RBC 4.84   HGB 14.4   HCT 41.5      MCV 86   MCH 29.7   MCHC 34.6     Lipid Panel:   Recent Labs  Lab 07/26/17  0648   CHOL 164   LDLCALC 104.8   HDL 35*   TRIG 121     Coagulation:   Recent Labs  Lab 07/26/17  0648   INR 1.0     Platelet Aggregation Study: No results for input(s): PLTAGG, PLTAGINTERP, PLTAGREGLACO, ADPPLTAGGREG in the last 168 hours.  Hgb A1C: No results for input(s): HGBA1C in the last 168 hours.  TSH:   Recent Labs  Lab 07/26/17  0648   TSH 7.330*       Diagnostic Results:  Brain Imaging:   CT head 7/26/17  1.  No acute intracranial abnormalities seen.      Echo from last admission 7/14/17  Normal LA   1 - Normal left ventricular systolic function (EF 60-65%).     2 - No wall motion abnormalities.     3 - Normal left ventricular diastolic function.     4 - Normal right ventricular systolic function .     MRI from 7/15/17 - no acute infarct or hemorrhage

## 2017-07-27 NOTE — CONSULTS
PM&R consult received.    Reason for consult:  assess rehabilitation needs    Reviewed patient history and current admission.  Rehab team following.  Full consult to follow.    CODEY Marie, FNP-C  Physical Medicine & Rehabilitation   07/27/2017  Spectralink: [id number]

## 2017-07-27 NOTE — PLAN OF CARE
Problem: Occupational Therapy Goal  Goal: Occupational Therapy Goal  is currently performing ADLs, functional mobility & t/fs without assistance and displays age-appropriate strength, endurance & balance. OT services are not recommended at this time and patient is safe to D/C home.   D/C acute OT services     Comments: Marquez Fung OTR/ARI  7/27/2017

## 2017-07-27 NOTE — PT/OT/SLP EVAL
Physical Therapy  Evaluation    Michael Alonzo   MRN: [id number]   Admitting Diagnosis: Right sided weakness    PT Received On: 07/27/17  PT Start Time: 1400     PT Stop Time: 1415    PT Total Time (min): 15 min       Billable Minutes:  Evaluation 15    Diagnosis: Right sided weakness    Past Medical History:   Diagnosis Date    Arthritis     High cholesterol       Past Surgical History:   Procedure Laterality Date    SPINAL FIXATION SURGERY           General Precautions: Standard,  (fall)         Patient History:  Lives With: spouse, child(mayte), dependent  Living Arrangements: house  Home Accessibility: stairs to enter home  Number of Stairs to Enter Home: 3  Stair Railings at Home: none  Equipment Currently Used at Home: cane, straight      Previous Level of Function:  Ambulation Skills:  (mod I with SC)  Transfer Skills: independent  ADL Skills: independent  Work/Leisure Activity: independent    Subjective:  Communicated with RN prior to session.  Pt c/o back pain from prior injury.  Pain/Comfort  Pain Rating 1: 4/10  Location 1: back  Pain Addressed 1: Reposition      Objective:   Patient found with:  (no attachments)     Cognitive Exam:  Oriented to: Person, Place, Time and Situation    Follows Commands/attention: Follows multistep  commands  Communication: clear/fluent  Safety awareness/insight to disability: intact    Physical Exam:  Sensation:   Impaired  light/touch decreased in RLE    Upper Extremity Range of Motion:  Lower Extremity Range of Motion:  Right Lower Extremity: WFL  Left Lower Extremity: WFL    Lower Extremity Strength:  Right Lower Extremity: Deficits: 4-/5  Left Lower Extremity: WFL       Functional Mobility:  Bed Mobility:   pt found standing in bathroom per OT    Transfers:  Sit <> Stand Assistance: Stand By Assistance  Sit <> Stand Assistive Device: No Assistive Device    Gait:   Gait Distance:  (120 feet)  Assistance 1: Contact Guard Assistance  Gait Assistive Device: No device  Gait  Pattern: 2-point gait  Gait Deviation(s): decreased id[place], increased time in double stance, decreased step length      Balance:   Static Sit: FAIR: Maintains without assist, but unable to take any challenges   Dynamic Sit: FAIR: Cannot move trunk without losing balance  Static Stand: FAIR: Maintains without assist but unable to take challenges  Dynamic stand: FAIR: Needs CONTACT GUARD during gait        AM-PAC 6 CLICK MOBILITY  How much help from another person does this patient currently need?   1 = Unable, Total/Dependent Assistance  2 = A lot, Maximum/Moderate Assistance  3 = A little, Minimum/Contact Guard/Supervision  4 = None, Modified [place]/Independent    Turning over in bed (including adjusting bedclothes, sheets and blankets)?: 3  Sitting down on and standing up from a chair with arms (e.g., wheelchair, bedside commode, etc.): 3  Moving from lying on back to sitting on the side of the bed?: 3  Moving to and from a bed to a chair (including a wheelchair)?: 3  Need to walk in hospital room?: 3  Climbing 3-5 steps with a railing?: 2  Total Score: 17     AM-PAC Raw Score CMS G-Code Modifier Level of Impairment Assistance   6 % Total / Unable   7 - 9 CM 80 - 100% Maximal Assist   10 - 14 CL 60 - 80% Moderate Assist   15 - 19 CK 40 - 60% Moderate Assist   20 - 22 CJ 20 - 40% Minimal Assist   23 CI 1-20% SBA / CGA   24 CH 0% Independent/ Mod I     Patient left seated at EOB with all lines intact and call button in reach.    Assessment:   Michael Alonzo is a 42 y.o. male with a medical diagnosis of Right sided weakness and presents with decreased functional independence secondary to decreased strength.    Rehab identified problem list/impairments: Rehab identified problem list/impairments: weakness, impaired sensation, gait instability, impaired balance, impaired endurance    Rehab potential is good.    Activity tolerance: Good    Discharge recommendations: Discharge Facility/Level Of Care  Needs: outpatient PT     Barriers to discharge: Barriers to Discharge: Inaccessible home environment    Equipment recommendations: Equipment Needed After Discharge: none     GOALS:    Physical Therapy Goals        Problem: Physical Therapy Goal    Goal Priority Disciplines Outcome Goal Variances Interventions   Physical Therapy Goal     PT/OT, PT Ongoing (interventions implemented as appropriate)     Description:  Goals to be met by: 8/3     Patient will increase functional independence with mobility by performin. Supine to sit with Modified [place]  2. Sit to supine with Modified [place]  3. Sit to stand transfer with Modified [place]  4. Gait  x 200 feet with Supervision using Single-point Cane   5. Ascend/descend 3 steps with SBA.                       PLAN:    Patient to be seen 5 x/week to address the above listed problems via therapeutic exercises, therapeutic activities, gait training, neuromuscular re-education  Plan of Care expires: 17  Plan of Care reviewed with: patient          Brandi ROCHELLE Bernabe, PT  2017

## 2017-07-31 ENCOUNTER — PATIENT OUTREACH (OUTPATIENT)
Dept: ADMINISTRATIVE | Facility: CLINIC | Age: 42
End: 2017-07-31

## 2017-07-31 PROBLEM — F44.9 CONVERSION DISORDER: Status: ACTIVE | Noted: 2017-07-31

## 2017-07-31 PROBLEM — F41.9 ANXIETY: Status: ACTIVE | Noted: 2017-07-31

## 2017-07-31 LAB — POCT GLUCOSE: 102 MG/DL (ref 70–110)

## 2017-07-31 NOTE — PATIENT INSTRUCTIONS
Understanding Anxiety Disorders  Almost everyone gets nervous now and then. Its normal to have knots in your stomach before a test, or for your heart to race on a first date. But an anxiety disorder is much more than a case of nerves. In fact, its symptoms may be overwhelming. But treatment can relieve many of these symptoms. Talking to your doctor is the first step.    What are anxiety disorders?  An anxiety disorder causes intense feelings of panic and fear. These feelings may arise for no apparent reason. And they tend to recur again and again. They may prevent you from coping with life and cause you great distress. As a result, you may avoid anything that triggers your fear. In extreme cases, you may never leave the house. Anxiety disorders may cause other symptoms, such as:  · Obsessive thoughts you cant control  · Constant nightmares or painful thoughts of the past  · Nausea, sweating, and muscle tension  · Difficulty sleeping or concentrating  What causes anxiety disorders?  Anxiety disorders tend to run in families. For some people, childhood abuse or neglect may play a role. For others, stressful life events or trauma may trigger anxiety disorders. Anxiety can trigger low self-esteem and poor coping skills.  Common anxiety disorders  · Panic disorder: This causes an intense fear of being in danger.  · Phobias: These are extreme fears of certain objects, places, or events.  · Obsessive-compulsive disorder: This causes you to have unwanted thoughts. You also may perform certain actions over and over.  · Posttraumatic stress disorder: This occurs in people who have survived a terrible ordeal. It can cause nightmares and flashbacks about the event.  · Generalized anxiety disorder: This causes constant worry that can greatly disrupt your life.   Getting better  You may believe that nothing can help you. Or, you might fear what others may think. But most anxiety symptoms can be eased. Having an anxiety  disorder is nothing to be ashamed of. Most people do best with treatment that combines medication and therapy. Although these arent cures, they can help you live a healthier life.  Date Last Reviewed: 2/11/2015  © 2151-2947 [employer]. [address] [place] [address], [place] [address]. All rights reserved. This information is not intended as a substitute for professional medical care. Always follow your healthcare professional's instructions.

## 2017-07-31 NOTE — PROGRESS NOTES
C3 nurse attempted to contact patient. No answer. The following message was left for the patient to return the call:  Good afternoon I am a nurse calling on behalf of [facility] from the [facility].  This is a Transitional Care Call for Michael Alonzo. When you have a moment please contact us at [phone] or [phone] Monday through Friday, between the hours of 8 am to 4 pm. We look forward to speaking with you. On behalf of [facility] have a nice day.    The patient does not have a scheduled HOSFU appointment within 7-14 days post hospital discharge date 07/27/17. Message sent to Physician staff to assist with HOSFU appointment scheduling.

## 2017-10-10 ENCOUNTER — TELEPHONE (OUTPATIENT)
Dept: NEUROSURGERY | Facility: HOSPITAL | Age: 42
End: 2017-10-10

## 2017-10-10 NOTE — TELEPHONE ENCOUNTER
90 Day Modified [place] 3- Moderate disability. Requires some help, but able to walk unassisted.

## 2017-10-13 ENCOUNTER — HOSPITAL ENCOUNTER (EMERGENCY)
Facility: HOSPITAL | Age: 42
Discharge: HOME | End: 2017-10-14
Attending: EMERGENCY MEDICINE
Payer: MEDICARE

## 2017-10-13 VITALS
HEART RATE: 88 BPM | BODY MASS INDEX: 30.09 KG/M2 | HEIGHT: 75 IN | SYSTOLIC BLOOD PRESSURE: 165 MMHG | WEIGHT: 242 LBS | DIASTOLIC BLOOD PRESSURE: 88 MMHG | OXYGEN SATURATION: 98 % | TEMPERATURE: 98 F | RESPIRATION RATE: 16 BRPM

## 2017-10-13 DIAGNOSIS — L02.414 ABSCESS OF LEFT ARM: Primary | ICD-10-CM

## 2017-10-13 PROCEDURE — 99283 EMERGENCY DEPT VISIT LOW MDM: CPT | Mod: 25

## 2017-10-13 PROCEDURE — 96372 THER/PROPH/DIAG INJ SC/IM: CPT

## 2017-10-13 PROCEDURE — 10061 I&D ABSCESS COMP/MULTIPLE: CPT

## 2017-10-13 RX ORDER — HYDROMORPHONE HYDROCHLORIDE 1 MG/ML
1 INJECTION, SOLUTION INTRAMUSCULAR; INTRAVENOUS; SUBCUTANEOUS
Status: COMPLETED | OUTPATIENT
Start: 2017-10-14 | End: 2017-10-13

## 2017-10-13 RX ORDER — CLINDAMYCIN PHOSPHATE 150 MG/ML
600 INJECTION, SOLUTION INTRAVENOUS
Status: COMPLETED | OUTPATIENT
Start: 2017-10-14 | End: 2017-10-13

## 2017-10-13 RX ADMIN — HYDROMORPHONE HYDROCHLORIDE 1 MG: 1 INJECTION, SOLUTION INTRAMUSCULAR; INTRAVENOUS; SUBCUTANEOUS at 11:10

## 2017-10-13 RX ADMIN — CLINDAMYCIN PHOSPHATE 600 MG: 150 INJECTION, SOLUTION INTRAMUSCULAR; INTRAVENOUS at 11:10

## 2017-10-14 PROCEDURE — 63600175 PHARM REV CODE 636 W HCPCS: Performed by: EMERGENCY MEDICINE

## 2017-10-14 PROCEDURE — 25000003 PHARM REV CODE 250: Performed by: EMERGENCY MEDICINE

## 2017-10-14 PROCEDURE — 25000003 PHARM REV CODE 250: Performed by: PHYSICIAN ASSISTANT

## 2017-10-14 PROCEDURE — 10061 I&D ABSCESS COMP/MULTIPLE: CPT

## 2017-10-14 PROCEDURE — S0077 INJECTION, CLINDAMYCIN PHOSP: HCPCS | Performed by: EMERGENCY MEDICINE

## 2017-10-14 RX ORDER — HYDROCODONE BITARTRATE AND ACETAMINOPHEN 5; 325 MG/1; MG/1
1 TABLET ORAL EVERY 8 HOURS PRN
Qty: 12 TABLET | Refills: 0 | Status: SHIPPED | OUTPATIENT
Start: 2017-10-14 | End: 2018-08-16

## 2017-10-14 RX ORDER — CLINDAMYCIN HYDROCHLORIDE 300 MG/1
300 CAPSULE ORAL EVERY 6 HOURS
Qty: 28 CAPSULE | Refills: 0 | Status: SHIPPED | OUTPATIENT
Start: 2017-10-14 | End: 2017-10-21

## 2017-10-14 RX ORDER — LIDOCAINE HYDROCHLORIDE 10 MG/ML
10 INJECTION, SOLUTION EPIDURAL; INFILTRATION; INTRACAUDAL; PERINEURAL
Status: COMPLETED | OUTPATIENT
Start: 2017-10-14 | End: 2017-10-14

## 2017-10-14 RX ADMIN — LIDOCAINE HYDROCHLORIDE 100 MG: 10 INJECTION, SOLUTION EPIDURAL; INFILTRATION; INTRACAUDAL; PERINEURAL at 12:10

## 2017-10-14 NOTE — DISCHARGE INSTRUCTIONS
Keep wound clean and dry.  Remove packing in 2-3 days.  Take antibiotics as prescribed.  For worsening symptoms, chest pain, shortness of breath, increased abdominal pain, high grade fever, stroke or stroke like symptoms, immediately go to the nearest Emergency Room or call 911 as soon as possible.

## 2017-10-14 NOTE — ED PROVIDER NOTES
Encounter Date: 10/13/2017    SCRIBE #1 NOTE: I, Susan Byrd, am scribing for, and in the presence of, Dr. Maldonado.       History     Chief Complaint   Patient presents with    Abscess     c/o insect bite to left forearm with edema and redness    Knee Pain     c/o left knee pain - 5 day old laceration that sutures came out in 2 days       10/13/2017 11:44 PM     Chief complaint: Abscess; Knee Pain      Michael Alonzo is a 42 y.o. male who presents to the ED with c/o left knee pain and abscess. Pt has a 5 day old laceration on left knee secondary to fall. The sutures came out 2 days ago. Pt reports burning sensation and daily use of hydrogen peroxide. Pt also has an abscess to his left forearm with edema and redness. No pertinent medical or surgical history.      The history is provided by the patient.     Review of patient's allergies indicates:   Allergen Reactions    Tramadol Anaphylaxis    Tomato (solanum lycopersicum) Hives     Past Medical History:   Diagnosis Date    Arthritis     High cholesterol      Past Surgical History:   Procedure Laterality Date    SPINAL FIXATION SURGERY       Family History   Problem Relation Age of Onset    Family history unknown: Yes     Social History   Substance Use Topics    Smoking status: Current Every Day Smoker     Packs/day: 0.50     Types: Cigarettes    Smokeless tobacco: Never Used    Alcohol use No     Review of Systems   Constitutional: Negative for fever.   HENT: Negative for sore throat.    Respiratory: Negative for shortness of breath.    Cardiovascular: Negative for chest pain.   Gastrointestinal: Negative for nausea.   Genitourinary: Negative for dysuria.   Musculoskeletal: Negative for back pain.   Skin: Positive for wound (Abscess and old left knee laceration). Negative for rash.   Neurological: Negative for weakness.   Hematological: Does not bruise/bleed easily.       Physical Exam     Initial Vitals [10/13/17 2245]   BP Pulse Resp Temp SpO2   (!)  165/88 88 16 98.3 °F (36.8 °C) 98 %      MAP       113.67         Physical Exam    Nursing note and vitals reviewed.  Constitutional: He appears well-developed and well-nourished.  Non-toxic appearance. No distress.   HENT:   Head: Normocephalic and atraumatic.   Eyes: EOM are normal. Pupils are equal, round, and reactive to light.   Neck: Normal range of motion. Neck supple. No neck rigidity. No JVD present.   Cardiovascular: Normal rate, regular rhythm, normal heart sounds and intact distal pulses. Exam reveals no gallop and no friction rub.    No murmur heard.  Pulmonary/Chest: Breath sounds normal. He has no wheezes. He has no rhonchi. He has no rales.   Abdominal: Soft. Bowel sounds are normal. He exhibits no distension. There is no tenderness. There is no rigidity, no rebound and no guarding.   Musculoskeletal: Normal range of motion.   Neurological: He is alert and oriented to person, place, and time. He has normal strength and normal reflexes. No cranial nerve deficit or sensory deficit. He exhibits normal muscle tone. Coordination normal. GCS eye subscore is 4. GCS verbal subscore is 5. GCS motor subscore is 6.   Skin: Skin is warm and dry. Laceration and abscess noted.   Abscess on left arm. 2 by 2 induration with 8 cm surrounding erythema. Calor and tender to palpation.   Old 3 cm laceration to left knee. No erythema.   Psychiatric: He has a normal mood and affect. His speech is normal and behavior is normal. He is not actively hallucinating.         ED Course   I & D - Incision and Drainage  Date/Time: 10/14/2017 1:34 AM  Performed by: SHANNAN IBANEZ  Authorized by: LISA JORDAN   Type: abscess  Body area: upper extremity  Location details: left arm  Anesthesia: local infiltration    Anesthesia:  Local Anesthetic: lidocaine 1% without epinephrine  Anesthetic total: 2 mL  Risk factor: underlying major vessel  Scalpel size: 11  Incision type: single straight  Complexity:  complex  Drainage: pus  Drainage amount: moderate  Wound treatment: incision,  drainage and  expression of material  Packing material: 1/4 in gauze  Patient tolerance: Patient tolerated the procedure well with no immediate complications        Labs Reviewed - No data to display          Medical Decision Making:   History:   Old Medical Records: I decided to obtain old medical records.  Initial Assessment:   This is an emergent evaluation of a patient with complaints of a skin infection.     I decided to obtain and review the old medical record.    Clinically the patient has an abscess. The patient's abscess has been incised and drained.  The patient will be placed on antibiotics.  The patient has no signs of systemic symptoms or significant cellulitis to warrant admission at this time.  The patient has been instructed to follow up with their regular doctor or the emergency department in 2 - 3 days for packing removal.  I've given the patient specific return precautions.    Wound care has been discussed.      The results and physical exam findings were reviewed with the patient. Pt agrees with assessment, disposition and treatment plan and has no further questions or complaints at this time.    Balta Maldonado M.D. 11:26 PM 10/16/2017                  Scribe Attestation:   Scribe #1: I performed the above scribed service and the documentation accurately describes the services I performed. I attest to the accuracy of the note.    I, Joel Hayes, personally performed the services described in this documentation. All medical record entries made by the scribe were at my direction and in my presence.  I have reviewed the chart and agree that the record reflects my personal performance and is accurate and complete. Balta Maldonado MD.  11:26 PM 10/16/2017          ED Course      Clinical Impression:     1. Abscess of left arm                                 Balta Maldonado MD  10/16/17 [id number]

## 2018-01-04 ENCOUNTER — TELEPHONE (OUTPATIENT)
Dept: ADMINISTRATIVE | Facility: OTHER | Age: 43
End: 2018-01-04

## 2018-08-16 ENCOUNTER — HOSPITAL ENCOUNTER (EMERGENCY)
Facility: HOSPITAL | Age: 43
Discharge: HOME | End: 2018-08-16
Attending: EMERGENCY MEDICINE
Payer: MEDICARE

## 2018-08-16 VITALS
HEIGHT: 74 IN | SYSTOLIC BLOOD PRESSURE: 136 MMHG | BODY MASS INDEX: 30.8 KG/M2 | WEIGHT: 240 LBS | TEMPERATURE: 99 F | OXYGEN SATURATION: 96 % | DIASTOLIC BLOOD PRESSURE: 84 MMHG | HEART RATE: 67 BPM | RESPIRATION RATE: 16 BRPM

## 2018-08-16 DIAGNOSIS — L03.113 CELLULITIS OF RIGHT UPPER EXTREMITY: Primary | ICD-10-CM
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
## 2018-08-16 DIAGNOSIS — S70.02XA CONTUSION OF LEFT HIP, INITIAL ENCOUNTER: ICD-10-CM

## 2018-08-16 DIAGNOSIS — R10.13 EPIGASTRIC PAIN: ICD-10-CM

## 2018-08-16 DIAGNOSIS — S30.1XXA CONTUSION OF ABDOMINAL WALL, INITIAL ENCOUNTER: ICD-10-CM

## 2018-08-16 LAB
ALBUMIN SERPL BCP-MCNC: 3.6 G/DL
ALP SERPL-CCNC: 96 U/L
ALT SERPL W/O P-5'-P-CCNC: 28 U/L
ANION GAP SERPL CALC-SCNC: 8 MMOL/L
AST SERPL-CCNC: 16 U/L
BASOPHILS # BLD AUTO: 0 K/UL
BASOPHILS NFR BLD: 0.5 %
BILIRUB SERPL-MCNC: 0.3 MG/DL
BUN SERPL-MCNC: 5 MG/DL
CALCIUM SERPL-MCNC: 9.7 MG/DL
CHLORIDE SERPL-SCNC: 107 MMOL/L
CO2 SERPL-SCNC: 24 MMOL/L
CREAT SERPL-MCNC: 0.7 MG/DL
DIFFERENTIAL METHOD: ABNORMAL
EOSINOPHIL # BLD AUTO: 0.5 K/UL
EOSINOPHIL NFR BLD: 6.1 %
ERYTHROCYTE [DISTWIDTH] IN BLOOD BY AUTOMATED COUNT: 12.5 %
EST. GFR  (AFRICAN AMERICAN): >60 ML/MIN/1.73 M^2
EST. GFR  (NON AFRICAN AMERICAN): >60 ML/MIN/1.73 M^2
GLUCOSE SERPL-MCNC: 97 MG/DL
HCT VFR BLD AUTO: 40.8 %
HGB BLD-MCNC: 14.3 G/DL
LIPASE SERPL-CCNC: 11 U/L
LYMPHOCYTES # BLD AUTO: 2.7 K/UL
LYMPHOCYTES NFR BLD: 32.7 %
MCH RBC QN AUTO: 30 PG
MCHC RBC AUTO-ENTMCNC: 35 G/DL
MCV RBC AUTO: 86 FL
MONOCYTES # BLD AUTO: 0.5 K/UL
MONOCYTES NFR BLD: 6.4 %
NEUTROPHILS # BLD AUTO: 4.6 K/UL
NEUTROPHILS NFR BLD: 54.3 %
PLATELET # BLD AUTO: 332 K/UL
PMV BLD AUTO: 7.8 FL
POTASSIUM SERPL-SCNC: 3.9 MMOL/L
PROT SERPL-MCNC: 6.9 G/DL
RBC # BLD AUTO: 4.76 M/UL
SODIUM SERPL-SCNC: 139 MMOL/L
WBC # BLD AUTO: 8.3 K/UL

## 2018-08-16 PROCEDURE — 85025 COMPLETE CBC W/AUTO DIFF WBC: CPT

## 2018-08-16 PROCEDURE — S0077 INJECTION, CLINDAMYCIN PHOSP: HCPCS | Performed by: EMERGENCY MEDICINE

## 2018-08-16 PROCEDURE — 96365 THER/PROPH/DIAG IV INF INIT: CPT

## 2018-08-16 PROCEDURE — 99284 EMERGENCY DEPT VISIT MOD MDM: CPT | Mod: 25

## 2018-08-16 PROCEDURE — 96375 TX/PRO/DX INJ NEW DRUG ADDON: CPT

## 2018-08-16 PROCEDURE — 25000003 PHARM REV CODE 250: Performed by: EMERGENCY MEDICINE

## 2018-08-16 PROCEDURE — 63600175 PHARM REV CODE 636 W HCPCS: Performed by: EMERGENCY MEDICINE

## 2018-08-16 PROCEDURE — 83690 ASSAY OF LIPASE: CPT

## 2018-08-16 PROCEDURE — 36415 COLL VENOUS BLD VENIPUNCTURE: CPT

## 2018-08-16 PROCEDURE — 80053 COMPREHEN METABOLIC PANEL: CPT

## 2018-08-16 RX ORDER — ROSUVASTATIN CALCIUM 40 MG/1
TABLET, COATED ORAL
Qty: 90 TABLET | Refills: 1 | OUTPATIENT
Start: 2018-08-16

## 2018-08-16 RX ORDER — CLINDAMYCIN HYDROCHLORIDE 300 MG/1
300 CAPSULE ORAL EVERY 8 HOURS
Qty: 20 CAPSULE | Refills: 0 | Status: SHIPPED | OUTPATIENT
Start: 2018-08-16 | End: 2018-08-23

## 2018-08-16 RX ORDER — DICLOFENAC SODIUM 50 MG/1
50 TABLET, DELAYED RELEASE ORAL 3 TIMES DAILY
Qty: 15 TABLET | Refills: 0 | Status: SHIPPED | OUTPATIENT
Start: 2018-08-16 | End: 2019-08-16

## 2018-08-16 RX ORDER — MORPHINE SULFATE 10 MG/ML
10 INJECTION INTRAMUSCULAR; INTRAVENOUS; SUBCUTANEOUS
Status: COMPLETED | OUTPATIENT
Start: 2018-08-16 | End: 2018-08-16

## 2018-08-16 RX ORDER — CLINDAMYCIN PHOSPHATE 600 MG/50ML
600 INJECTION, SOLUTION INTRAVENOUS
Status: COMPLETED | OUTPATIENT
Start: 2018-08-16 | End: 2018-08-16

## 2018-08-16 RX ADMIN — MORPHINE SULFATE 10 MG: 10 INJECTION INTRAVENOUS at 09:08

## 2018-08-16 RX ADMIN — CLINDAMYCIN IN 5 PERCENT DEXTROSE 600 MG: 12 INJECTION, SOLUTION INTRAVENOUS at 10:08

## 2018-08-16 RX ADMIN — BACITRACIN ZINC NEOMYCIN SULFATE POLYMYXIN B SULFATE 15 G: 400; 3.5; 5 OINTMENT TOPICAL at 10:08
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
## 2020-12-23 ENCOUNTER — OFFICE VISIT (OUTPATIENT)
Dept: FAMILY MEDICINE | Facility: CLINIC | Age: 45
End: 2020-12-23
Payer: MEDICARE

## 2020-12-23 VITALS
TEMPERATURE: 97 F | OXYGEN SATURATION: 97 % | DIASTOLIC BLOOD PRESSURE: 80 MMHG | SYSTOLIC BLOOD PRESSURE: 122 MMHG | BODY MASS INDEX: 33.47 KG/M2 | HEIGHT: 75 IN | WEIGHT: 269.19 LBS | HEART RATE: 71 BPM

## 2020-12-23 DIAGNOSIS — M54.10 RADICULOPATHY AFFECTING UPPER EXTREMITY: ICD-10-CM

## 2020-12-23 DIAGNOSIS — E78.2 MIXED HYPERLIPIDEMIA: Primary | ICD-10-CM

## 2020-12-23 DIAGNOSIS — M51.36 DEGENERATION OF LUMBAR INTERVERTEBRAL DISC: ICD-10-CM

## 2020-12-23 PROBLEM — I63.9 ARTERIAL ISCHEMIC STROKE: Status: ACTIVE | Noted: 2017-07-20

## 2020-12-23 PROBLEM — E03.9 HYPOTHYROIDISM: Status: ACTIVE | Noted: 2017-07-20

## 2020-12-23 PROBLEM — M54.9 CHRONIC BACK PAIN: Status: ACTIVE | Noted: 2018-06-15

## 2020-12-23 PROBLEM — G89.29 CHRONIC BACK PAIN: Status: ACTIVE | Noted: 2018-06-15

## 2020-12-23 PROBLEM — F32.A DEPRESSIVE DISORDER: Status: ACTIVE | Noted: 2020-09-24

## 2020-12-23 PROBLEM — F17.200 NICOTINE DEPENDENCE: Status: ACTIVE | Noted: 2017-07-20

## 2020-12-23 PROCEDURE — 99214 PR OFFICE/OUTPT VISIT, EST, LEVL IV, 30-39 MIN: ICD-10-PCS | Mod: S$GLB,,, | Performed by: FAMILY MEDICINE

## 2020-12-23 PROCEDURE — 99214 OFFICE O/P EST MOD 30 MIN: CPT | Mod: S$GLB,,, | Performed by: FAMILY MEDICINE

## 2020-12-23 RX ORDER — DICLOFENAC SODIUM 10 MG/G
GEL TOPICAL
COMMUNITY
End: 2020-12-23

## 2020-12-23 RX ORDER — OXYCODONE HYDROCHLORIDE 10 MG/1
10 TABLET ORAL EVERY 12 HOURS PRN
Qty: 60 TABLET | Refills: 0 | Status: SHIPPED | OUTPATIENT
Start: 2021-01-22 | End: 2021-03-08

## 2020-12-23 RX ORDER — ATORVASTATIN CALCIUM 10 MG/1
TABLET, FILM COATED ORAL
COMMUNITY
End: 2020-12-23

## 2020-12-23 RX ORDER — OXYCODONE HYDROCHLORIDE 10 MG/1
10 TABLET ORAL 2 TIMES DAILY PRN
COMMUNITY
Start: 2020-11-25 | End: 2020-12-23 | Stop reason: SDUPTHER

## 2020-12-23 RX ORDER — ALBUTEROL SULFATE 90 UG/1
AEROSOL, METERED RESPIRATORY (INHALATION)
COMMUNITY
End: 2021-05-25 | Stop reason: SDUPTHER

## 2020-12-23 RX ORDER — BUSPIRONE HYDROCHLORIDE 15 MG/1
15 TABLET ORAL 2 TIMES DAILY
COMMUNITY
Start: 2020-12-19 | End: 2021-05-25 | Stop reason: SDUPTHER

## 2020-12-23 RX ORDER — TRAZODONE HYDROCHLORIDE 50 MG/1
100 TABLET ORAL NIGHTLY
COMMUNITY
Start: 2020-12-19 | End: 2020-12-23

## 2020-12-23 RX ORDER — CELECOXIB 200 MG/1
200 CAPSULE ORAL EVERY MORNING
COMMUNITY
Start: 2020-12-19 | End: 2020-12-23

## 2020-12-23 RX ORDER — TRAZODONE HYDROCHLORIDE 50 MG/1
TABLET ORAL
COMMUNITY
End: 2020-12-23

## 2020-12-23 RX ORDER — ROSUVASTATIN CALCIUM 20 MG/1
20 TABLET, COATED ORAL DAILY
COMMUNITY
Start: 2020-10-24 | End: 2021-05-25 | Stop reason: SDUPTHER

## 2020-12-23 RX ORDER — DICLOFENAC SODIUM 10 MG/G
GEL TOPICAL
COMMUNITY
Start: 2020-12-21 | End: 2021-02-23 | Stop reason: SDUPTHER

## 2020-12-23 RX ORDER — CYCLOBENZAPRINE HCL 10 MG
TABLET ORAL
COMMUNITY
End: 2021-05-25

## 2020-12-23 RX ORDER — OXYCODONE HYDROCHLORIDE 10 MG/1
10 TABLET ORAL EVERY 12 HOURS PRN
Qty: 60 TABLET | Refills: 0 | Status: SHIPPED | OUTPATIENT
Start: 2020-12-23 | End: 2021-02-23 | Stop reason: SDUPTHER

## 2020-12-23 RX ORDER — CELECOXIB 200 MG/1
CAPSULE ORAL
COMMUNITY
End: 2021-05-25 | Stop reason: SDUPTHER

## 2020-12-23 RX ORDER — ACETAMINOPHEN 325 MG/1
TABLET ORAL
COMMUNITY
End: 2020-12-23

## 2020-12-23 NOTE — PROGRESS NOTES
SUBJECTIVE:    Patient ID: Michael Alonzo Sr. is a 45 y.o. male.    Chief Complaint: Follow-up (refill oxycodone)    HPI    No visits with results within 6 Month(s) from this visit.   Latest known visit with results is:   Admission on 08/16/2018, Discharged on 08/16/2018   Component Date Value Ref Range Status    Sodium 08/16/2018 139  136 - 145 mmol/L Final    Potassium 08/16/2018 3.9  3.5 - 5.1 mmol/L Final    Chloride 08/16/2018 107  95 - 110 mmol/L Final    CO2 08/16/2018 24  23 - 29 mmol/L Final    Glucose 08/16/2018 97  70 - 110 mg/dL Final    BUN 08/16/2018 5* 6 - 20 mg/dL Final    Creatinine 08/16/2018 0.7  0.5 - 1.4 mg/dL Final    Calcium 08/16/2018 9.7  8.7 - 10.5 mg/dL Final    Total Protein 08/16/2018 6.9  6.0 - 8.4 g/dL Final    Albumin 08/16/2018 3.6  3.5 - 5.2 g/dL Final    Total Bilirubin 08/16/2018 0.3  0.1 - 1.0 mg/dL Final    Alkaline Phosphatase 08/16/2018 96  55 - 135 U/L Final    AST 08/16/2018 16  10 - 40 U/L Final    ALT 08/16/2018 28  10 - 44 U/L Final    Anion Gap 08/16/2018 8  8 - 16 mmol/L Final    eGFR if African American 08/16/2018 >60  >60 mL/min/1.73 m^2 Final    eGFR if non African American 08/16/2018 >60  >60 mL/min/1.73 m^2 Final    WBC 08/16/2018 8.30  3.90 - 12.70 K/uL Final    RBC 08/16/2018 4.76  4.60 - 6.20 M/uL Final    Hemoglobin 08/16/2018 14.3  14.0 - 18.0 g/dL Final    Hematocrit 08/16/2018 40.8  40.0 - 54.0 % Final    MCV 08/16/2018 86  82 - 98 fL Final    MCH 08/16/2018 30.0  27.0 - 31.0 pg Final    MCHC 08/16/2018 35.0  32.0 - 36.0 g/dL Final    RDW 08/16/2018 12.5  11.5 - 14.5 % Final    Platelets 08/16/2018 332  150 - 350 K/uL Final    MPV 08/16/2018 7.8* 9.2 - 12.9 fL Final    Gran # (ANC) 08/16/2018 4.6  1.8 - 7.7 K/uL Final    Lymph # 08/16/2018 2.7  1.0 - 4.8 K/uL Final    Mono # 08/16/2018 0.5  0.3 - 1.0 K/uL Final    Eos # 08/16/2018 0.5  0.0 - 0.5 K/uL Final    Baso # 08/16/2018 0.00  0.00 - 0.20 K/uL Final    Gran %  08/16/2018 54.3  38.0 - 73.0 % Final    Lymph % 08/16/2018 32.7  18.0 - 48.0 % Final    Mono % 08/16/2018 6.4  4.0 - 15.0 % Final    Eosinophil % 08/16/2018 6.1  0.0 - 8.0 % Final    Basophil % 08/16/2018 0.5  0.0 - 1.9 % Final    Differential Method 08/16/2018 Automated   Final    Lipase 08/16/2018 11  4 - 60 U/L Final       Past Medical History:   Diagnosis Date    Arthritis     High cholesterol     Stroke     Thyroid disease      Social History     Socioeconomic History    Marital status:      Spouse name: Not on file    Number of children: Not on file    Years of education: Not on file    Highest education level: Not on file   Occupational History    Not on file   Tobacco Use    Smoking status: Current Every Day Smoker     Packs/day: 0.50     Types: Cigarettes    Smokeless tobacco: Never Used   Substance and Sexual Activity    Alcohol use: No    Drug use: No    Sexual activity: Yes     Partners: Female   Other Topics Concern    Not on file   Social History Narrative    Not on file     Social Determinants of Health     Financial Resource Strain:     Difficulty of Paying Living Expenses:    Food Insecurity:     Worried About Running Out of Food in the Last Year:     Ran Out of Food in the Last Year:    Transportation Needs:     Lack of Transportation (Medical):     Lack of Transportation (Non-Medical):    Physical Activity:     Days of Exercise per Week:     Minutes of Exercise per Session:    Stress:     Feeling of Stress :    Social Connections:     Frequency of Communication with Friends and Family:     Frequency of Social Gatherings with Friends and Family:     Attends Sikhism Services:     Active Member of Clubs or Organizations:     Attends Club or Organization Meetings:     Marital Status:      Past Surgical History:   Procedure Laterality Date    OTHER SURGICAL HISTORY      left hand    SPINAL FIXATION SURGERY       Family History   Problem Relation Age of  Onset    Stroke Mother     Diabetes Father        Review of patient's allergies indicates:   Allergen Reactions    Amitriptyline Anaphylaxis and Itching    Decadron [dexamethasone sodium phosphate] Anaphylaxis    Paroxetine Anaphylaxis    Tramadol Anaphylaxis    Tomato (solanum lycopersicum) Hives       Current Outpatient Medications:     gabapentin (NEURONTIN) 600 MG tablet, Take 600 mg by mouth 3 (three) times daily. 1 tablet QAM, 1 tablet at noon, and 2 tablets QHS, Disp: , Rfl:     albuterol (VENTOLIN HFA) 90 mcg/actuation inhaler, Ventolin HFA 90 mcg/actuation aerosol inhaler  INHALE TWO PUFFS INTO THE LUNGS EVERY 4 HOURS, Disp: , Rfl:     aspirin 325 MG tablet, Take 1 tablet (325 mg total) by mouth once daily., Disp: , Rfl: 0    busPIRone (BUSPAR) 15 MG tablet, Take 15 mg by mouth 2 (two) times daily., Disp: , Rfl:     celecoxib (CELEBREX) 200 MG capsule, celecoxib 200 mg capsule, Disp: , Rfl:     cyclobenzaprine (FLEXERIL) 10 MG tablet, cyclobenzaprine 10 mg tablet, Disp: , Rfl:     diclofenac sodium (VOLTAREN) 1 % Gel, APPLY TWO grams to the AFFECTED area(s) TOPICALLY FOUR TIMES DAILY, Disp: , Rfl:     diphenhydrAMINE-acetaminophen (TYLENOL PM EXTRA STRENGTH)  mg Tab, Tylenol PM  2 HS, Disp: , Rfl:     oxyCODONE (ROXICODONE) 10 mg Tab immediate release tablet, Take 1 tablet (10 mg total) by mouth every 12 (twelve) hours as needed., Disp: 60 tablet, Rfl: 0    oxyCODONE (ROXICODONE) 10 mg Tab immediate release tablet, Take 1 tablet (10 mg total) by mouth every 12 (twelve) hours as needed., Disp: 60 tablet, Rfl: 0    rosuvastatin (CRESTOR) 20 MG tablet, Take 20 mg by mouth once daily., Disp: , Rfl:     traZODone (DESYREL) 50 MG tablet, trazodone 50 mg tablet  TAKE TWO TABLETS BY MOUTH AT BEDTIME, Disp: , Rfl:     Review of Systems        Objective:      Vitals:    12/23/20 1256   BP: 122/80   Pulse: 71   Temp: 97.3 °F (36.3 °C)   SpO2: 97%   Weight: 122.1 kg (269 lb 3.2 oz)   Height: 6'  "3" (1.905 m)     Physical Exam  Vitals signs and nursing note reviewed.   Constitutional:       Appearance: Normal appearance.   Cardiovascular:      Rate and Rhythm: Normal rate and regular rhythm.      Heart sounds: Normal heart sounds.   Pulmonary:      Effort: Pulmonary effort is normal.      Breath sounds: Normal breath sounds.   Musculoskeletal:      Right lower leg: No edema.      Left lower leg: No edema.   Neurological:      Mental Status: He is alert.           Assessment:       1. Mixed hyperlipidemia    2. Radiculopathy affecting upper extremity    3. Degeneration of lumbar intervertebral disc         Plan:       Mixed hyperlipidemia  -     Lipid Panel; Future; Expected date: 12/23/2020    Radiculopathy affecting upper extremity  -     oxyCODONE (ROXICODONE) 10 mg Tab immediate release tablet; Take 1 tablet (10 mg total) by mouth every 12 (twelve) hours as needed.  Dispense: 60 tablet; Refill: 0  -     oxyCODONE (ROXICODONE) 10 mg Tab immediate release tablet; Take 1 tablet (10 mg total) by mouth every 12 (twelve) hours as needed.  Dispense: 60 tablet; Refill: 0    Degeneration of lumbar intervertebral disc  -     oxyCODONE (ROXICODONE) 10 mg Tab immediate release tablet; Take 1 tablet (10 mg total) by mouth every 12 (twelve) hours as needed.  Dispense: 60 tablet; Refill: 0  -     oxyCODONE (ROXICODONE) 10 mg Tab immediate release tablet; Take 1 tablet (10 mg total) by mouth every 12 (twelve) hours as needed.  Dispense: 60 tablet; Refill: 0      Follow up in about 2 months (around 2/23/2021).        2/23/2021 José Miguel Chapman M.D.      "

## 2021-01-13 ENCOUNTER — TELEPHONE (OUTPATIENT)
Dept: FAMILY MEDICINE | Facility: CLINIC | Age: 46
End: 2021-01-13

## 2021-01-25 ENCOUNTER — LAB VISIT (OUTPATIENT)
Dept: LAB | Facility: CLINIC | Age: 46
End: 2021-01-25
Payer: MEDICARE

## 2021-01-25 DIAGNOSIS — E78.2 MIXED HYPERLIPIDEMIA: ICD-10-CM

## 2021-01-25 LAB
CHOLEST SERPL-MCNC: 170 MG/DL (ref 120–199)
CHOLEST/HDLC SERPL: 5.3 {RATIO} (ref 2–5)
HDLC SERPL-MCNC: 32 MG/DL (ref 40–75)
HDLC SERPL: 18.8 % (ref 20–50)
LDLC SERPL CALC-MCNC: 102.6 MG/DL (ref 63–159)
NONHDLC SERPL-MCNC: 138 MG/DL
TRIGL SERPL-MCNC: 177 MG/DL (ref 30–150)

## 2021-01-25 PROCEDURE — 80061 LIPID PANEL: CPT

## 2021-02-23 ENCOUNTER — OFFICE VISIT (OUTPATIENT)
Dept: FAMILY MEDICINE | Facility: CLINIC | Age: 46
End: 2021-02-23
Payer: MEDICARE

## 2021-02-23 ENCOUNTER — LAB VISIT (OUTPATIENT)
Dept: LAB | Facility: CLINIC | Age: 46
End: 2021-02-23
Payer: MEDICARE

## 2021-02-23 VITALS
HEIGHT: 75 IN | DIASTOLIC BLOOD PRESSURE: 74 MMHG | SYSTOLIC BLOOD PRESSURE: 130 MMHG | WEIGHT: 267.81 LBS | TEMPERATURE: 98 F | BODY MASS INDEX: 33.3 KG/M2 | OXYGEN SATURATION: 95 % | HEART RATE: 77 BPM

## 2021-02-23 DIAGNOSIS — R73.03 PREDIABETES: ICD-10-CM

## 2021-02-23 DIAGNOSIS — I63.9 ARTERIAL ISCHEMIC STROKE: Primary | ICD-10-CM

## 2021-02-23 DIAGNOSIS — M54.10 RADICULOPATHY AFFECTING UPPER EXTREMITY: ICD-10-CM

## 2021-02-23 DIAGNOSIS — E03.9 HYPOTHYROIDISM, UNSPECIFIED TYPE: ICD-10-CM

## 2021-02-23 DIAGNOSIS — F32.A DEPRESSIVE DISORDER: ICD-10-CM

## 2021-02-23 DIAGNOSIS — M51.36 DEGENERATION OF LUMBAR INTERVERTEBRAL DISC: ICD-10-CM

## 2021-02-23 LAB
GLUCOSE SERPL-MCNC: 115 MG/DL (ref 70–110)
HBA1C MFR BLD: NORMAL %
TSH SERPL DL<=0.005 MIU/L-ACNC: 2.98 UIU/ML (ref 0.4–4)

## 2021-02-23 PROCEDURE — 82962 GLUCOSE BLOOD TEST: CPT | Mod: ,,, | Performed by: FAMILY MEDICINE

## 2021-02-23 PROCEDURE — 82962 POCT GLUCOSE, HAND-HELD DEVICE: ICD-10-PCS | Mod: ,,, | Performed by: FAMILY MEDICINE

## 2021-02-23 PROCEDURE — 99214 OFFICE O/P EST MOD 30 MIN: CPT | Mod: S$GLB,,, | Performed by: FAMILY MEDICINE

## 2021-02-23 PROCEDURE — 36415 PR COLLECTION VENOUS BLOOD,VENIPUNCTURE: ICD-10-PCS | Mod: ,,, | Performed by: FAMILY MEDICINE

## 2021-02-23 PROCEDURE — 99214 PR OFFICE/OUTPT VISIT, EST, LEVL IV, 30-39 MIN: ICD-10-PCS | Mod: S$GLB,,, | Performed by: FAMILY MEDICINE

## 2021-02-23 PROCEDURE — 84443 ASSAY THYROID STIM HORMONE: CPT

## 2021-02-23 PROCEDURE — 83036 POCT HEMOGLOBIN A1C: ICD-10-PCS | Mod: QW,,, | Performed by: FAMILY MEDICINE

## 2021-02-23 PROCEDURE — 83036 HEMOGLOBIN GLYCOSYLATED A1C: CPT | Mod: QW,,, | Performed by: FAMILY MEDICINE

## 2021-02-23 PROCEDURE — 36415 COLL VENOUS BLD VENIPUNCTURE: CPT | Mod: ,,, | Performed by: FAMILY MEDICINE

## 2021-02-23 RX ORDER — OXYCODONE HYDROCHLORIDE 10 MG/1
10 TABLET ORAL EVERY 12 HOURS PRN
Qty: 60 TABLET | Refills: 0 | Status: SHIPPED | OUTPATIENT
Start: 2021-02-23 | End: 2021-03-08

## 2021-02-23 RX ORDER — GABAPENTIN 600 MG/1
600 TABLET ORAL
Qty: 450 TABLET | Refills: 3 | Status: SHIPPED | OUTPATIENT
Start: 2021-02-23 | End: 2021-05-25 | Stop reason: SDUPTHER

## 2021-02-23 RX ORDER — TRAZODONE HYDROCHLORIDE 50 MG/1
TABLET ORAL
COMMUNITY
End: 2021-05-25 | Stop reason: SDUPTHER

## 2021-02-23 RX ORDER — DICLOFENAC SODIUM 10 MG/G
GEL TOPICAL 4 TIMES DAILY
Qty: 350 G | Refills: 3 | Status: SHIPPED | OUTPATIENT
Start: 2021-02-23 | End: 2021-05-25 | Stop reason: SDUPTHER

## 2021-03-08 ENCOUNTER — TELEPHONE (OUTPATIENT)
Dept: PAIN MEDICINE | Facility: CLINIC | Age: 46
End: 2021-03-08

## 2021-03-08 ENCOUNTER — OFFICE VISIT (OUTPATIENT)
Dept: PAIN MEDICINE | Facility: CLINIC | Age: 46
End: 2021-03-08
Payer: MEDICARE

## 2021-03-08 VITALS
SYSTOLIC BLOOD PRESSURE: 142 MMHG | HEART RATE: 70 BPM | WEIGHT: 267 LBS | DIASTOLIC BLOOD PRESSURE: 80 MMHG | BODY MASS INDEX: 33.2 KG/M2 | HEIGHT: 75 IN

## 2021-03-08 DIAGNOSIS — F11.90 CHRONIC, CONTINUOUS USE OF OPIOIDS: ICD-10-CM

## 2021-03-08 DIAGNOSIS — G89.4 CHRONIC PAIN SYNDROME: ICD-10-CM

## 2021-03-08 DIAGNOSIS — M54.16 CHRONIC LUMBAR RADICULOPATHY: ICD-10-CM

## 2021-03-08 DIAGNOSIS — M96.1 POST LAMINECTOMY SYNDROME: Primary | ICD-10-CM

## 2021-03-08 PROCEDURE — 99214 OFFICE O/P EST MOD 30 MIN: CPT | Mod: PBBFAC,PN | Performed by: ANESTHESIOLOGY

## 2021-03-08 PROCEDURE — 99999 PR PBB SHADOW E&M-EST. PATIENT-LVL IV: CPT | Mod: PBBFAC,,, | Performed by: ANESTHESIOLOGY

## 2021-03-08 PROCEDURE — 80307 DRUG TEST PRSMV CHEM ANLYZR: CPT | Performed by: ANESTHESIOLOGY

## 2021-03-08 PROCEDURE — 99999 PR PBB SHADOW E&M-EST. PATIENT-LVL IV: ICD-10-PCS | Mod: PBBFAC,,, | Performed by: ANESTHESIOLOGY

## 2021-03-08 PROCEDURE — 99204 OFFICE O/P NEW MOD 45 MIN: CPT | Mod: S$GLB,,, | Performed by: ANESTHESIOLOGY

## 2021-03-08 PROCEDURE — 99204 PR OFFICE/OUTPT VISIT, NEW, LEVL IV, 45-59 MIN: ICD-10-PCS | Mod: S$GLB,,, | Performed by: ANESTHESIOLOGY

## 2021-03-08 RX ORDER — OXYCODONE HYDROCHLORIDE 10 MG/1
10 TABLET ORAL EVERY 12 HOURS PRN
Qty: 90 TABLET | Refills: 0 | Status: SHIPPED | OUTPATIENT
Start: 2021-03-24 | End: 2021-10-12

## 2021-03-15 ENCOUNTER — TELEPHONE (OUTPATIENT)
Dept: PAIN MEDICINE | Facility: CLINIC | Age: 46
End: 2021-03-15

## 2021-03-18 LAB
6MAM UR QL: NOT DETECTED
7AMINOCLONAZEPAM UR QL: NOT DETECTED
A-OH ALPRAZ UR QL: NOT DETECTED
ALPHA-OH-MIDAZOLAM: NOT DETECTED
ALPRAZ UR QL: NOT DETECTED
AMPHET UR QL SCN: NOT DETECTED
ANNOTATION COMMENT IMP: NORMAL
ANNOTATION COMMENT IMP: NORMAL
BARBITURATES UR QL: PRESENT
BUPRENORPHINE UR QL: PRESENT
BZE UR QL: NOT DETECTED
CARBOXYTHC UR QL: NOT DETECTED
CARISOPRODOL UR QL: NOT DETECTED
CLONAZEPAM UR QL: NOT DETECTED
CODEINE UR QL: NOT DETECTED
CREAT UR-MCNC: 300.6 MG/DL (ref 20–400)
DIAZEPAM UR QL: NOT DETECTED
ETHYL GLUCURONIDE UR QL: NOT DETECTED
FENTANYL UR QL: NOT DETECTED
GABAPENTIN: PRESENT
HYDROCODONE UR QL: PRESENT
HYDROMORPHONE UR QL: PRESENT
LORAZEPAM UR QL: NOT DETECTED
MDA UR QL: NOT DETECTED
MDEA UR QL: NOT DETECTED
MDMA UR QL: NOT DETECTED
ME-PHENIDATE UR QL: NOT DETECTED
MEPERIDINE UR QL: NOT DETECTED
METHADONE UR QL: NOT DETECTED
METHAMPHET UR QL: NOT DETECTED
MIDAZOLAM UR QL SCN: NOT DETECTED
MORPHINE UR QL: NOT DETECTED
NALOXONE: NOT DETECTED
NORBUPRENORPHINE UR QL CFM: PRESENT
NORDIAZEPAM UR QL: PRESENT
NORFENTANYL UR QL: NOT DETECTED
NORHYDROCODONE UR QL CFM: PRESENT
NOROXYCODONE UR QL CFM: NOT DETECTED
NOROXYMORPHONE: NOT DETECTED
OXAZEPAM UR QL: PRESENT
OXYCODONE UR QL: NOT DETECTED
OXYMORPHONE UR QL: NOT DETECTED
PATHOLOGY STUDY: NORMAL
PCP UR QL: NOT DETECTED
PHENTERMINE UR QL: NOT DETECTED
PREGABALIN: NOT DETECTED
PROPOXYPH UR QL: NORMAL
SERVICE CMNT-IMP: NORMAL
TAPENTADOL UR QL SCN: NOT DETECTED
TAPENTADOL-O-SULF: NOT DETECTED
TEMAZEPAM UR QL: PRESENT
TRAMADOL UR QL: NOT DETECTED
ZOLPIDEM METABOLITE: NOT DETECTED
ZOLPIDEM UR QL: NOT DETECTED

## 2021-03-24 ENCOUNTER — TELEPHONE (OUTPATIENT)
Dept: PAIN MEDICINE | Facility: CLINIC | Age: 46
End: 2021-03-24

## 2021-03-24 ENCOUNTER — TELEPHONE (OUTPATIENT)
Dept: FAMILY MEDICINE | Facility: CLINIC | Age: 46
End: 2021-03-24

## 2021-03-30 ENCOUNTER — TELEPHONE (OUTPATIENT)
Dept: FAMILY MEDICINE | Facility: CLINIC | Age: 46
End: 2021-03-30

## 2021-03-30 DIAGNOSIS — M51.36 DEGENERATION OF LUMBAR INTERVERTEBRAL DISC: Primary | ICD-10-CM

## 2021-04-15 ENCOUNTER — TELEPHONE (OUTPATIENT)
Dept: FAMILY MEDICINE | Facility: CLINIC | Age: 46
End: 2021-04-15

## 2021-05-02 DIAGNOSIS — M54.10 RADICULOPATHY AFFECTING UPPER EXTREMITY: Primary | ICD-10-CM

## 2021-05-13 ENCOUNTER — TELEPHONE (OUTPATIENT)
Dept: FAMILY MEDICINE | Facility: CLINIC | Age: 46
End: 2021-05-13

## 2021-05-18 ENCOUNTER — TELEPHONE (OUTPATIENT)
Dept: FAMILY MEDICINE | Facility: CLINIC | Age: 46
End: 2021-05-18

## 2021-05-25 ENCOUNTER — OFFICE VISIT (OUTPATIENT)
Dept: FAMILY MEDICINE | Facility: CLINIC | Age: 46
End: 2021-05-25
Payer: MEDICARE

## 2021-05-25 VITALS
DIASTOLIC BLOOD PRESSURE: 80 MMHG | HEART RATE: 94 BPM | WEIGHT: 258 LBS | OXYGEN SATURATION: 96 % | BODY MASS INDEX: 32.08 KG/M2 | HEIGHT: 75 IN | TEMPERATURE: 98 F | SYSTOLIC BLOOD PRESSURE: 122 MMHG

## 2021-05-25 DIAGNOSIS — R53.1 RIGHT SIDED WEAKNESS: ICD-10-CM

## 2021-05-25 DIAGNOSIS — E78.2 MIXED HYPERLIPIDEMIA: ICD-10-CM

## 2021-05-25 DIAGNOSIS — E03.9 HYPOTHYROIDISM, UNSPECIFIED TYPE: ICD-10-CM

## 2021-05-25 DIAGNOSIS — G89.4 CHRONIC PAIN SYNDROME: ICD-10-CM

## 2021-05-25 DIAGNOSIS — R21 RASH: ICD-10-CM

## 2021-05-25 DIAGNOSIS — M51.36 DEGENERATION OF LUMBAR INTERVERTEBRAL DISC: ICD-10-CM

## 2021-05-25 DIAGNOSIS — R73.03 PREDIABETES: ICD-10-CM

## 2021-05-25 DIAGNOSIS — F51.01 PRIMARY INSOMNIA: ICD-10-CM

## 2021-05-25 DIAGNOSIS — F41.9 ANXIETY: ICD-10-CM

## 2021-05-25 DIAGNOSIS — F32.A DEPRESSIVE DISORDER: ICD-10-CM

## 2021-05-25 DIAGNOSIS — Z79.891 ENCOUNTER FOR LONG-TERM USE OF OPIATE ANALGESIC: ICD-10-CM

## 2021-05-25 DIAGNOSIS — J43.2 CENTRILOBULAR EMPHYSEMA: ICD-10-CM

## 2021-05-25 DIAGNOSIS — F41.1 GENERALIZED ANXIETY DISORDER: ICD-10-CM

## 2021-05-25 DIAGNOSIS — Z86.73 HISTORY OF STROKE: Primary | ICD-10-CM

## 2021-05-25 DIAGNOSIS — F33.1 MODERATE EPISODE OF RECURRENT MAJOR DEPRESSIVE DISORDER: ICD-10-CM

## 2021-05-25 PROBLEM — Z92.82 STATUS POST ADMINISTRATION OF TPA (RTPA) IN A DIFFERENT FACILITY WITHIN THE LAST 24 HOURS PRIOR TO ADMISSION TO CURRENT FACILITY: Status: RESOLVED | Noted: 2017-07-14 | Resolved: 2021-05-25

## 2021-05-25 PROBLEM — F17.200 CURRENT EVERY DAY SMOKER: Status: RESOLVED | Noted: 2017-07-14 | Resolved: 2021-05-25

## 2021-05-25 PROBLEM — I63.312 THROMBOTIC STROKE INVOLVING LEFT MIDDLE CEREBRAL ARTERY: Status: RESOLVED | Noted: 2017-07-14 | Resolved: 2021-05-25

## 2021-05-25 PROCEDURE — 3008F PR BODY MASS INDEX (BMI) DOCUMENTED: ICD-10-PCS | Mod: CPTII,S$GLB,, | Performed by: STUDENT IN AN ORGANIZED HEALTH CARE EDUCATION/TRAINING PROGRAM

## 2021-05-25 PROCEDURE — 99214 OFFICE O/P EST MOD 30 MIN: CPT | Mod: S$GLB,,, | Performed by: STUDENT IN AN ORGANIZED HEALTH CARE EDUCATION/TRAINING PROGRAM

## 2021-05-25 PROCEDURE — 99214 PR OFFICE/OUTPT VISIT, EST, LEVL IV, 30-39 MIN: ICD-10-PCS | Mod: S$GLB,,, | Performed by: STUDENT IN AN ORGANIZED HEALTH CARE EDUCATION/TRAINING PROGRAM

## 2021-05-25 PROCEDURE — 1125F PR PAIN SEVERITY QUANTIFIED, PAIN PRESENT: ICD-10-PCS | Mod: S$GLB,,, | Performed by: STUDENT IN AN ORGANIZED HEALTH CARE EDUCATION/TRAINING PROGRAM

## 2021-05-25 PROCEDURE — 1125F AMNT PAIN NOTED PAIN PRSNT: CPT | Mod: S$GLB,,, | Performed by: STUDENT IN AN ORGANIZED HEALTH CARE EDUCATION/TRAINING PROGRAM

## 2021-05-25 PROCEDURE — 3008F BODY MASS INDEX DOCD: CPT | Mod: CPTII,S$GLB,, | Performed by: STUDENT IN AN ORGANIZED HEALTH CARE EDUCATION/TRAINING PROGRAM

## 2021-05-25 RX ORDER — ASPIRIN 81 MG/1
1 TABLET ORAL DAILY
COMMUNITY

## 2021-05-25 RX ORDER — CELECOXIB 200 MG/1
200 CAPSULE ORAL DAILY
Qty: 30 CAPSULE | Refills: 3 | Status: SHIPPED | OUTPATIENT
Start: 2021-05-25 | End: 2021-08-16

## 2021-05-25 RX ORDER — MUPIROCIN 20 MG/G
OINTMENT TOPICAL 3 TIMES DAILY
Qty: 30 G | Refills: 0 | Status: ON HOLD | OUTPATIENT
Start: 2021-05-25 | End: 2022-03-30 | Stop reason: HOSPADM

## 2021-05-25 RX ORDER — KETOROLAC TROMETHAMINE 10 MG/1
10 TABLET, FILM COATED ORAL DAILY
COMMUNITY
Start: 2021-04-18 | End: 2021-05-25

## 2021-05-25 RX ORDER — DICLOFENAC SODIUM 10 MG/G
GEL TOPICAL 4 TIMES DAILY
Qty: 350 G | Refills: 3 | Status: SHIPPED | OUTPATIENT
Start: 2021-05-25 | End: 2021-10-12 | Stop reason: SDUPTHER

## 2021-05-25 RX ORDER — DOCUSATE SODIUM 100 MG/1
1 CAPSULE, LIQUID FILLED ORAL DAILY PRN
COMMUNITY

## 2021-05-25 RX ORDER — ALBUTEROL SULFATE 90 UG/1
2 AEROSOL, METERED RESPIRATORY (INHALATION) EVERY 4 HOURS PRN
Qty: 18 G | Refills: 1 | Status: SHIPPED | OUTPATIENT
Start: 2021-05-25 | End: 2021-10-12 | Stop reason: SDUPTHER

## 2021-05-25 RX ORDER — ROSUVASTATIN CALCIUM 20 MG/1
20 TABLET, COATED ORAL DAILY
Qty: 30 TABLET | Refills: 3 | Status: SHIPPED | OUTPATIENT
Start: 2021-05-25 | End: 2021-07-20

## 2021-05-25 RX ORDER — METFORMIN HYDROCHLORIDE 1000 MG/1
1000 TABLET ORAL DAILY
COMMUNITY
End: 2021-05-25 | Stop reason: SDUPTHER

## 2021-05-25 RX ORDER — LEVOTHYROXINE SODIUM 25 UG/1
25 TABLET ORAL DAILY
Qty: 30 TABLET | Refills: 3 | Status: SHIPPED | OUTPATIENT
Start: 2021-05-25 | End: 2021-08-16

## 2021-05-25 RX ORDER — GABAPENTIN 600 MG/1
600 TABLET ORAL 3 TIMES DAILY
Qty: 90 TABLET | Refills: 3 | Status: SHIPPED | OUTPATIENT
Start: 2021-05-25 | End: 2021-10-12 | Stop reason: SDUPTHER

## 2021-05-25 RX ORDER — DULOXETIN HYDROCHLORIDE 30 MG/1
30 CAPSULE, DELAYED RELEASE ORAL DAILY
Qty: 30 CAPSULE | Refills: 3 | Status: SHIPPED | OUTPATIENT
Start: 2021-05-25 | End: 2021-08-16

## 2021-05-25 RX ORDER — METFORMIN HYDROCHLORIDE 1000 MG/1
1000 TABLET ORAL DAILY
Qty: 30 TABLET | Refills: 3 | Status: SHIPPED | OUTPATIENT
Start: 2021-05-25 | End: 2021-07-20

## 2021-05-25 RX ORDER — TRAZODONE HYDROCHLORIDE 50 MG/1
50 TABLET ORAL NIGHTLY PRN
Qty: 30 TABLET | Refills: 3 | Status: SHIPPED | OUTPATIENT
Start: 2021-05-25 | End: 2021-08-16

## 2021-05-25 RX ORDER — LEVOTHYROXINE SODIUM 25 UG/1
1 TABLET ORAL DAILY
COMMUNITY
End: 2021-05-25 | Stop reason: SDUPTHER

## 2021-05-25 RX ORDER — FLUTICASONE PROPIONATE 50 MCG
1 SPRAY, SUSPENSION (ML) NASAL DAILY PRN
Status: ON HOLD | COMMUNITY
End: 2022-03-30 | Stop reason: HOSPADM

## 2021-05-25 RX ORDER — BUSPIRONE HYDROCHLORIDE 15 MG/1
15 TABLET ORAL 2 TIMES DAILY
Qty: 60 TABLET | Refills: 3 | Status: SHIPPED | OUTPATIENT
Start: 2021-05-25 | End: 2021-10-11

## 2021-05-26 ENCOUNTER — TELEPHONE (OUTPATIENT)
Dept: FAMILY MEDICINE | Facility: CLINIC | Age: 46
End: 2021-05-26

## 2021-05-28 ENCOUNTER — TELEPHONE (OUTPATIENT)
Dept: FAMILY MEDICINE | Facility: CLINIC | Age: 46
End: 2021-05-28

## 2021-05-31 ENCOUNTER — PATIENT OUTREACH (OUTPATIENT)
Dept: ADMINISTRATIVE | Facility: OTHER | Age: 46
End: 2021-05-31

## 2021-06-01 ENCOUNTER — TELEPHONE (OUTPATIENT)
Dept: PAIN MEDICINE | Facility: CLINIC | Age: 46
End: 2021-06-01

## 2021-07-20 ENCOUNTER — TELEPHONE (OUTPATIENT)
Dept: FAMILY MEDICINE | Facility: CLINIC | Age: 46
End: 2021-07-20

## 2021-07-20 DIAGNOSIS — R73.03 PREDIABETES: ICD-10-CM

## 2021-07-20 DIAGNOSIS — M96.1 POST LAMINECTOMY SYNDROME: ICD-10-CM

## 2021-07-20 RX ORDER — METFORMIN HYDROCHLORIDE 1000 MG/1
TABLET ORAL
Qty: 30 TABLET | Refills: 3 | Status: SHIPPED | OUTPATIENT
Start: 2021-07-20 | End: 2021-09-13

## 2021-08-04 ENCOUNTER — PATIENT MESSAGE (OUTPATIENT)
Dept: FAMILY MEDICINE | Facility: CLINIC | Age: 46
End: 2021-08-04

## 2021-08-04 ENCOUNTER — TELEPHONE (OUTPATIENT)
Dept: FAMILY MEDICINE | Facility: CLINIC | Age: 46
End: 2021-08-04

## 2021-09-15 ENCOUNTER — TELEPHONE (OUTPATIENT)
Dept: FAMILY MEDICINE | Facility: CLINIC | Age: 46
End: 2021-09-15

## 2021-09-17 ENCOUNTER — TELEPHONE (OUTPATIENT)
Dept: FAMILY MEDICINE | Facility: CLINIC | Age: 46
End: 2021-09-17

## 2021-10-12 ENCOUNTER — OFFICE VISIT (OUTPATIENT)
Dept: FAMILY MEDICINE | Facility: CLINIC | Age: 46
End: 2021-10-12
Payer: MEDICARE

## 2021-10-12 VITALS
WEIGHT: 261 LBS | HEART RATE: 80 BPM | DIASTOLIC BLOOD PRESSURE: 92 MMHG | BODY MASS INDEX: 32.45 KG/M2 | OXYGEN SATURATION: 96 % | HEIGHT: 75 IN | SYSTOLIC BLOOD PRESSURE: 164 MMHG | TEMPERATURE: 98 F

## 2021-10-12 DIAGNOSIS — J43.2 CENTRILOBULAR EMPHYSEMA: ICD-10-CM

## 2021-10-12 DIAGNOSIS — M51.36 DEGENERATION OF LUMBAR INTERVERTEBRAL DISC: ICD-10-CM

## 2021-10-12 DIAGNOSIS — T14.8XXA NONHEALING NONSURGICAL WOUND: ICD-10-CM

## 2021-10-12 DIAGNOSIS — F41.9 ANXIETY: ICD-10-CM

## 2021-10-12 DIAGNOSIS — F32.A DEPRESSIVE DISORDER: ICD-10-CM

## 2021-10-12 DIAGNOSIS — G89.29 CHRONIC BILATERAL LOW BACK PAIN WITH LEFT-SIDED SCIATICA: ICD-10-CM

## 2021-10-12 DIAGNOSIS — F41.1 GENERALIZED ANXIETY DISORDER: ICD-10-CM

## 2021-10-12 DIAGNOSIS — E03.9 HYPOTHYROIDISM, UNSPECIFIED TYPE: ICD-10-CM

## 2021-10-12 DIAGNOSIS — G89.4 CHRONIC PAIN SYNDROME: ICD-10-CM

## 2021-10-12 DIAGNOSIS — E03.9 ACQUIRED HYPOTHYROIDISM: ICD-10-CM

## 2021-10-12 DIAGNOSIS — Z86.73 HISTORY OF STROKE: Primary | ICD-10-CM

## 2021-10-12 DIAGNOSIS — F51.01 PRIMARY INSOMNIA: ICD-10-CM

## 2021-10-12 DIAGNOSIS — E78.2 MIXED HYPERLIPIDEMIA: ICD-10-CM

## 2021-10-12 DIAGNOSIS — R73.03 PREDIABETES: ICD-10-CM

## 2021-10-12 DIAGNOSIS — F33.1 MODERATE EPISODE OF RECURRENT MAJOR DEPRESSIVE DISORDER: ICD-10-CM

## 2021-10-12 DIAGNOSIS — M54.42 CHRONIC BILATERAL LOW BACK PAIN WITH LEFT-SIDED SCIATICA: ICD-10-CM

## 2021-10-12 PROCEDURE — 1159F MED LIST DOCD IN RCRD: CPT | Mod: CPTII,S$GLB,, | Performed by: STUDENT IN AN ORGANIZED HEALTH CARE EDUCATION/TRAINING PROGRAM

## 2021-10-12 PROCEDURE — 3080F DIAST BP >= 90 MM HG: CPT | Mod: CPTII,S$GLB,, | Performed by: STUDENT IN AN ORGANIZED HEALTH CARE EDUCATION/TRAINING PROGRAM

## 2021-10-12 PROCEDURE — 3077F PR MOST RECENT SYSTOLIC BLOOD PRESSURE >= 140 MM HG: ICD-10-PCS | Mod: CPTII,S$GLB,, | Performed by: STUDENT IN AN ORGANIZED HEALTH CARE EDUCATION/TRAINING PROGRAM

## 2021-10-12 PROCEDURE — 3046F PR MOST RECENT HEMOGLOBIN A1C LEVEL > 9.0%: ICD-10-PCS | Mod: CPTII,S$GLB,, | Performed by: STUDENT IN AN ORGANIZED HEALTH CARE EDUCATION/TRAINING PROGRAM

## 2021-10-12 PROCEDURE — 1160F PR REVIEW ALL MEDS BY PRESCRIBER/CLIN PHARMACIST DOCUMENTED: ICD-10-PCS | Mod: CPTII,S$GLB,, | Performed by: STUDENT IN AN ORGANIZED HEALTH CARE EDUCATION/TRAINING PROGRAM

## 2021-10-12 PROCEDURE — 3046F HEMOGLOBIN A1C LEVEL >9.0%: CPT | Mod: CPTII,S$GLB,, | Performed by: STUDENT IN AN ORGANIZED HEALTH CARE EDUCATION/TRAINING PROGRAM

## 2021-10-12 PROCEDURE — 3008F PR BODY MASS INDEX (BMI) DOCUMENTED: ICD-10-PCS | Mod: CPTII,S$GLB,, | Performed by: STUDENT IN AN ORGANIZED HEALTH CARE EDUCATION/TRAINING PROGRAM

## 2021-10-12 PROCEDURE — 1160F RVW MEDS BY RX/DR IN RCRD: CPT | Mod: CPTII,S$GLB,, | Performed by: STUDENT IN AN ORGANIZED HEALTH CARE EDUCATION/TRAINING PROGRAM

## 2021-10-12 PROCEDURE — 3008F BODY MASS INDEX DOCD: CPT | Mod: CPTII,S$GLB,, | Performed by: STUDENT IN AN ORGANIZED HEALTH CARE EDUCATION/TRAINING PROGRAM

## 2021-10-12 PROCEDURE — 1159F PR MEDICATION LIST DOCUMENTED IN MEDICAL RECORD: ICD-10-PCS | Mod: CPTII,S$GLB,, | Performed by: STUDENT IN AN ORGANIZED HEALTH CARE EDUCATION/TRAINING PROGRAM

## 2021-10-12 PROCEDURE — 99214 PR OFFICE/OUTPT VISIT, EST, LEVL IV, 30-39 MIN: ICD-10-PCS | Mod: S$GLB,,, | Performed by: STUDENT IN AN ORGANIZED HEALTH CARE EDUCATION/TRAINING PROGRAM

## 2021-10-12 PROCEDURE — 99214 OFFICE O/P EST MOD 30 MIN: CPT | Mod: S$GLB,,, | Performed by: STUDENT IN AN ORGANIZED HEALTH CARE EDUCATION/TRAINING PROGRAM

## 2021-10-12 PROCEDURE — 3077F SYST BP >= 140 MM HG: CPT | Mod: CPTII,S$GLB,, | Performed by: STUDENT IN AN ORGANIZED HEALTH CARE EDUCATION/TRAINING PROGRAM

## 2021-10-12 PROCEDURE — 3080F PR MOST RECENT DIASTOLIC BLOOD PRESSURE >= 90 MM HG: ICD-10-PCS | Mod: CPTII,S$GLB,, | Performed by: STUDENT IN AN ORGANIZED HEALTH CARE EDUCATION/TRAINING PROGRAM

## 2021-10-12 RX ORDER — TRAZODONE HYDROCHLORIDE 50 MG/1
50 TABLET ORAL NIGHTLY PRN
Qty: 30 TABLET | Refills: 3 | Status: SHIPPED | OUTPATIENT
Start: 2021-10-12 | End: 2021-12-06

## 2021-10-12 RX ORDER — LEVOTHYROXINE SODIUM 25 UG/1
25 TABLET ORAL DAILY
Qty: 30 TABLET | Refills: 3 | Status: SHIPPED | OUTPATIENT
Start: 2021-10-12 | End: 2021-12-06

## 2021-10-12 RX ORDER — MELOXICAM 7.5 MG/1
7.5 TABLET ORAL DAILY
COMMUNITY
Start: 2021-08-13 | End: 2021-12-06 | Stop reason: SDUPTHER

## 2021-10-12 RX ORDER — ALBUTEROL SULFATE 90 UG/1
2 AEROSOL, METERED RESPIRATORY (INHALATION) EVERY 4 HOURS PRN
Qty: 18 G | Refills: 1 | Status: SHIPPED | OUTPATIENT
Start: 2021-10-12 | End: 2021-12-06

## 2021-10-12 RX ORDER — DICLOFENAC SODIUM 10 MG/G
GEL TOPICAL 4 TIMES DAILY
Qty: 350 G | Refills: 3 | Status: SHIPPED | OUTPATIENT
Start: 2021-10-12 | End: 2022-05-09

## 2021-10-12 RX ORDER — ETODOLAC 200 MG/1
200 CAPSULE ORAL
COMMUNITY
Start: 2021-09-18 | End: 2021-12-06 | Stop reason: SDUPTHER

## 2021-10-12 RX ORDER — GABAPENTIN 600 MG/1
600 TABLET ORAL 3 TIMES DAILY
Qty: 90 TABLET | Refills: 3 | Status: SHIPPED | OUTPATIENT
Start: 2021-10-12 | End: 2022-02-02

## 2021-10-12 RX ORDER — METHOCARBAMOL 500 MG/1
500 TABLET, FILM COATED ORAL 3 TIMES DAILY
Status: ON HOLD | COMMUNITY
Start: 2021-08-16 | End: 2022-03-30 | Stop reason: HOSPADM

## 2021-10-12 RX ORDER — BUSPIRONE HYDROCHLORIDE 15 MG/1
15 TABLET ORAL 2 TIMES DAILY
Qty: 60 TABLET | Refills: 3 | Status: SHIPPED | OUTPATIENT
Start: 2021-10-12 | End: 2022-06-14 | Stop reason: SDUPTHER

## 2021-10-12 RX ORDER — DULOXETIN HYDROCHLORIDE 30 MG/1
30 CAPSULE, DELAYED RELEASE ORAL DAILY
Qty: 30 CAPSULE | Refills: 3 | Status: SHIPPED | OUTPATIENT
Start: 2021-10-12 | End: 2021-12-06

## 2021-10-12 RX ORDER — CELECOXIB 200 MG/1
200 CAPSULE ORAL DAILY
Qty: 30 CAPSULE | Refills: 3 | Status: SHIPPED | OUTPATIENT
Start: 2021-10-12 | End: 2021-12-06

## 2022-01-21 DIAGNOSIS — J43.2 CENTRILOBULAR EMPHYSEMA: ICD-10-CM

## 2022-01-21 RX ORDER — ALBUTEROL SULFATE 90 UG/1
2 AEROSOL, METERED RESPIRATORY (INHALATION) EVERY 6 HOURS PRN
COMMUNITY
End: 2022-01-21 | Stop reason: SDUPTHER

## 2022-01-21 RX ORDER — ALBUTEROL SULFATE 90 UG/1
2 AEROSOL, METERED RESPIRATORY (INHALATION) EVERY 6 HOURS PRN
Qty: 18 G | Refills: 3 | Status: SHIPPED | OUTPATIENT
Start: 2022-01-21 | End: 2022-02-01

## 2022-02-01 DIAGNOSIS — J43.2 CENTRILOBULAR EMPHYSEMA: Primary | ICD-10-CM

## 2022-02-01 RX ORDER — ALBUTEROL SULFATE 90 UG/1
2 AEROSOL, METERED RESPIRATORY (INHALATION) EVERY 6 HOURS PRN
Qty: 17 G | Refills: 11 | Status: SHIPPED | OUTPATIENT
Start: 2022-02-01 | End: 2023-03-30 | Stop reason: SDUPTHER

## 2022-02-05 PROCEDURE — 99283 EMERGENCY DEPT VISIT LOW MDM: CPT | Mod: 25

## 2022-02-06 ENCOUNTER — HOSPITAL ENCOUNTER (EMERGENCY)
Facility: HOSPITAL | Age: 47
Discharge: HOME | End: 2022-02-06
Attending: EMERGENCY MEDICINE
Payer: MEDICARE

## 2022-02-06 VITALS
RESPIRATION RATE: 16 BRPM | DIASTOLIC BLOOD PRESSURE: 78 MMHG | BODY MASS INDEX: 32.33 KG/M2 | SYSTOLIC BLOOD PRESSURE: 158 MMHG | WEIGHT: 260 LBS | HEART RATE: 72 BPM | HEIGHT: 75 IN | TEMPERATURE: 98 F | OXYGEN SATURATION: 98 %

## 2022-02-06 DIAGNOSIS — M25.511 ACUTE PAIN OF RIGHT SHOULDER: Primary | ICD-10-CM

## 2022-02-06 PROCEDURE — 25000003 PHARM REV CODE 250: Performed by: EMERGENCY MEDICINE

## 2022-02-06 RX ORDER — HYDROCODONE BITARTRATE AND ACETAMINOPHEN 10; 325 MG/1; MG/1
1 TABLET ORAL
Status: COMPLETED | OUTPATIENT
Start: 2022-02-06 | End: 2022-02-06

## 2022-02-06 RX ORDER — HYDROCODONE BITARTRATE AND ACETAMINOPHEN 10; 325 MG/1; MG/1
1 TABLET ORAL EVERY 4 HOURS PRN
Qty: 12 TABLET | Refills: 0 | Status: ON HOLD | OUTPATIENT
Start: 2022-02-06 | End: 2022-03-27

## 2022-02-06 RX ADMIN — HYDROCODONE BITARTRATE AND ACETAMINOPHEN 1 TABLET: 10; 325 TABLET ORAL at 02:02
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
## 2022-02-07 ENCOUNTER — TELEPHONE (OUTPATIENT)
Dept: FAMILY MEDICINE | Facility: CLINIC | Age: 47
End: 2022-02-07
Payer: MEDICAID

## 2022-02-07 NOTE — TELEPHONE ENCOUNTER
Contacted pt to reschedule 2/14/2022 appointment due to Dr. Warren being out of clinic. No VM. Portal message sent

## 2022-03-18 ENCOUNTER — PATIENT MESSAGE (OUTPATIENT)
Dept: ADMINISTRATIVE | Facility: HOSPITAL | Age: 47
End: 2022-03-18
Payer: MEDICAID

## 2022-03-27 ENCOUNTER — HOSPITAL ENCOUNTER (OUTPATIENT)
Facility: HOSPITAL | Age: 47
Discharge: HOME | End: 2022-03-28
Attending: EMERGENCY MEDICINE | Admitting: HOSPITALIST
Payer: MEDICARE

## 2022-03-27 DIAGNOSIS — T14.8XXA WOUND INFECTION: Primary | ICD-10-CM

## 2022-03-27 DIAGNOSIS — L08.9 WOUND INFECTION: Primary | ICD-10-CM

## 2022-03-27 DIAGNOSIS — L03.90 CELLULITIS: ICD-10-CM

## 2022-03-27 PROBLEM — L03.114 CELLULITIS OF LEFT UPPER EXTREMITY: Status: ACTIVE | Noted: 2022-03-27

## 2022-03-27 LAB
ALBUMIN SERPL BCP-MCNC: 3.5 G/DL (ref 3.5–5.2)
ALP SERPL-CCNC: 88 U/L (ref 55–135)
ALT SERPL W/O P-5'-P-CCNC: 28 U/L (ref 10–44)
ANION GAP SERPL CALC-SCNC: 9 MMOL/L (ref 8–16)
AST SERPL-CCNC: 35 U/L (ref 10–40)
BASOPHILS # BLD AUTO: 0.06 K/UL (ref 0–0.2)
BASOPHILS NFR BLD: 1 % (ref 0–1.9)
BILIRUB SERPL-MCNC: 0.5 MG/DL (ref 0.1–1)
BUN SERPL-MCNC: 10 MG/DL (ref 6–20)
CALCIUM SERPL-MCNC: 9.2 MG/DL (ref 8.7–10.5)
CHLORIDE SERPL-SCNC: 105 MMOL/L (ref 95–110)
CO2 SERPL-SCNC: 26 MMOL/L (ref 23–29)
CREAT SERPL-MCNC: 0.8 MG/DL (ref 0.5–1.4)
DIFFERENTIAL METHOD: ABNORMAL
EOSINOPHIL # BLD AUTO: 0.2 K/UL (ref 0–0.5)
EOSINOPHIL NFR BLD: 3.8 % (ref 0–8)
ERYTHROCYTE [DISTWIDTH] IN BLOOD BY AUTOMATED COUNT: 12.2 % (ref 11.5–14.5)
EST. GFR  (AFRICAN AMERICAN): >60 ML/MIN/1.73 M^2
EST. GFR  (NON AFRICAN AMERICAN): >60 ML/MIN/1.73 M^2
GLUCOSE SERPL-MCNC: 105 MG/DL (ref 70–110)
HCT VFR BLD AUTO: 37.7 % (ref 40–54)
HGB BLD-MCNC: 12.8 G/DL (ref 14–18)
IMM GRANULOCYTES # BLD AUTO: 0.01 K/UL (ref 0–0.04)
IMM GRANULOCYTES NFR BLD AUTO: 0.2 % (ref 0–0.5)
LYMPHOCYTES # BLD AUTO: 2 K/UL (ref 1–4.8)
LYMPHOCYTES NFR BLD: 31 % (ref 18–48)
MCH RBC QN AUTO: 29.2 PG (ref 27–31)
MCHC RBC AUTO-ENTMCNC: 34 G/DL (ref 32–36)
MCV RBC AUTO: 86 FL (ref 82–98)
MONOCYTES # BLD AUTO: 0.9 K/UL (ref 0.3–1)
MONOCYTES NFR BLD: 13.5 % (ref 4–15)
NEUTROPHILS # BLD AUTO: 3.2 K/UL (ref 1.8–7.7)
NEUTROPHILS NFR BLD: 50.5 % (ref 38–73)
NRBC BLD-RTO: 0 /100 WBC
PLATELET # BLD AUTO: 233 K/UL (ref 150–450)
PMV BLD AUTO: 9.3 FL (ref 9.2–12.9)
POCT GLUCOSE: 192 MG/DL (ref 70–110)
POTASSIUM SERPL-SCNC: 3.8 MMOL/L (ref 3.5–5.1)
PROT SERPL-MCNC: 6.8 G/DL (ref 6–8.4)
RBC # BLD AUTO: 4.38 M/UL (ref 4.6–6.2)
SARS-COV-2 RDRP RESP QL NAA+PROBE: NEGATIVE
SODIUM SERPL-SCNC: 140 MMOL/L (ref 136–145)
WBC # BLD AUTO: 6.3 K/UL (ref 3.9–12.7)

## 2022-03-27 PROCEDURE — 96375 TX/PRO/DX INJ NEW DRUG ADDON: CPT

## 2022-03-27 PROCEDURE — G0378 HOSPITAL OBSERVATION PER HR: HCPCS

## 2022-03-27 PROCEDURE — 99285 EMERGENCY DEPT VISIT HI MDM: CPT | Mod: 25

## 2022-03-27 PROCEDURE — 25000003 PHARM REV CODE 250: Performed by: EMERGENCY MEDICINE

## 2022-03-27 PROCEDURE — 85025 COMPLETE CBC W/AUTO DIFF WBC: CPT | Performed by: EMERGENCY MEDICINE

## 2022-03-27 PROCEDURE — 63600175 PHARM REV CODE 636 W HCPCS: Performed by: EMERGENCY MEDICINE

## 2022-03-27 PROCEDURE — 63600175 PHARM REV CODE 636 W HCPCS

## 2022-03-27 PROCEDURE — U0002 COVID-19 LAB TEST NON-CDC: HCPCS | Performed by: EMERGENCY MEDICINE

## 2022-03-27 PROCEDURE — 96372 THER/PROPH/DIAG INJ SC/IM: CPT

## 2022-03-27 PROCEDURE — S4991 NICOTINE PATCH NONLEGEND: HCPCS

## 2022-03-27 PROCEDURE — 36415 COLL VENOUS BLD VENIPUNCTURE: CPT | Performed by: EMERGENCY MEDICINE

## 2022-03-27 PROCEDURE — 96365 THER/PROPH/DIAG IV INF INIT: CPT

## 2022-03-27 PROCEDURE — 80053 COMPREHEN METABOLIC PANEL: CPT | Performed by: EMERGENCY MEDICINE

## 2022-03-27 PROCEDURE — 25000003 PHARM REV CODE 250

## 2022-03-27 RX ORDER — ATORVASTATIN CALCIUM 40 MG/1
80 TABLET, FILM COATED ORAL DAILY
Refills: 3 | Status: DISCONTINUED | OUTPATIENT
Start: 2022-03-28 | End: 2022-03-28 | Stop reason: HOSPADM

## 2022-03-27 RX ORDER — CLINDAMYCIN PHOSPHATE 900 MG/50ML
900 INJECTION, SOLUTION INTRAVENOUS
Status: DISCONTINUED | OUTPATIENT
Start: 2022-03-28 | End: 2022-03-28 | Stop reason: HOSPADM

## 2022-03-27 RX ORDER — HYDROCODONE BITARTRATE AND ACETAMINOPHEN 10; 325 MG/1; MG/1
1 TABLET ORAL EVERY 6 HOURS PRN
Status: DISCONTINUED | OUTPATIENT
Start: 2022-03-27 | End: 2022-03-28 | Stop reason: HOSPADM

## 2022-03-27 RX ORDER — INSULIN ASPART 100 [IU]/ML
0-5 INJECTION, SOLUTION INTRAVENOUS; SUBCUTANEOUS
Status: DISCONTINUED | OUTPATIENT
Start: 2022-03-27 | End: 2022-03-28 | Stop reason: HOSPADM

## 2022-03-27 RX ORDER — ONDANSETRON 2 MG/ML
4 INJECTION INTRAMUSCULAR; INTRAVENOUS
Status: COMPLETED | OUTPATIENT
Start: 2022-03-27 | End: 2022-03-27

## 2022-03-27 RX ORDER — LANOLIN ALCOHOL/MO/W.PET/CERES
800 CREAM (GRAM) TOPICAL
Status: DISCONTINUED | OUTPATIENT
Start: 2022-03-27 | End: 2022-03-28 | Stop reason: HOSPADM

## 2022-03-27 RX ORDER — CLINDAMYCIN PHOSPHATE 900 MG/50ML
900 INJECTION, SOLUTION INTRAVENOUS
Status: COMPLETED | OUTPATIENT
Start: 2022-03-27 | End: 2022-03-27

## 2022-03-27 RX ORDER — KETOROLAC TROMETHAMINE 30 MG/ML
15 INJECTION, SOLUTION INTRAMUSCULAR; INTRAVENOUS ONCE
Status: COMPLETED | OUTPATIENT
Start: 2022-03-27 | End: 2022-03-27

## 2022-03-27 RX ORDER — ONDANSETRON 2 MG/ML
4 INJECTION INTRAMUSCULAR; INTRAVENOUS EVERY 6 HOURS PRN
Status: DISCONTINUED | OUTPATIENT
Start: 2022-03-27 | End: 2022-03-28 | Stop reason: HOSPADM

## 2022-03-27 RX ORDER — TALC
9 POWDER (GRAM) TOPICAL NIGHTLY PRN
Status: DISCONTINUED | OUTPATIENT
Start: 2022-03-27 | End: 2022-03-28 | Stop reason: HOSPADM

## 2022-03-27 RX ORDER — DULOXETIN HYDROCHLORIDE 30 MG/1
30 CAPSULE, DELAYED RELEASE ORAL DAILY
Status: DISCONTINUED | OUTPATIENT
Start: 2022-03-28 | End: 2022-03-28 | Stop reason: HOSPADM

## 2022-03-27 RX ORDER — SODIUM CHLORIDE 0.9 % (FLUSH) 0.9 %
10 SYRINGE (ML) INJECTION EVERY 12 HOURS PRN
Status: DISCONTINUED | OUTPATIENT
Start: 2022-03-27 | End: 2022-03-28 | Stop reason: HOSPADM

## 2022-03-27 RX ORDER — ENOXAPARIN SODIUM 100 MG/ML
40 INJECTION SUBCUTANEOUS EVERY 24 HOURS
Status: DISCONTINUED | OUTPATIENT
Start: 2022-03-27 | End: 2022-03-28 | Stop reason: HOSPADM

## 2022-03-27 RX ORDER — DOCUSATE SODIUM 100 MG/1
100 CAPSULE, LIQUID FILLED ORAL DAILY PRN
Status: DISCONTINUED | OUTPATIENT
Start: 2022-03-27 | End: 2022-03-28 | Stop reason: HOSPADM

## 2022-03-27 RX ORDER — LEVOTHYROXINE SODIUM 25 UG/1
25 TABLET ORAL DAILY
Status: DISCONTINUED | OUTPATIENT
Start: 2022-03-28 | End: 2022-03-28 | Stop reason: HOSPADM

## 2022-03-27 RX ORDER — HYDROMORPHONE HYDROCHLORIDE 2 MG/ML
1 INJECTION, SOLUTION INTRAMUSCULAR; INTRAVENOUS; SUBCUTANEOUS
Status: COMPLETED | OUTPATIENT
Start: 2022-03-27 | End: 2022-03-27

## 2022-03-27 RX ORDER — NALOXONE HCL 0.4 MG/ML
0.02 VIAL (ML) INJECTION
Status: DISCONTINUED | OUTPATIENT
Start: 2022-03-27 | End: 2022-03-28 | Stop reason: HOSPADM

## 2022-03-27 RX ORDER — IPRATROPIUM BROMIDE AND ALBUTEROL SULFATE 2.5; .5 MG/3ML; MG/3ML
3 SOLUTION RESPIRATORY (INHALATION) EVERY 6 HOURS PRN
Status: DISCONTINUED | OUTPATIENT
Start: 2022-03-27 | End: 2022-03-28 | Stop reason: HOSPADM

## 2022-03-27 RX ORDER — IBUPROFEN 200 MG
1 TABLET ORAL DAILY
Status: DISCONTINUED | OUTPATIENT
Start: 2022-03-27 | End: 2022-03-28 | Stop reason: HOSPADM

## 2022-03-27 RX ORDER — IBUPROFEN 200 MG
16 TABLET ORAL
Status: DISCONTINUED | OUTPATIENT
Start: 2022-03-27 | End: 2022-03-28 | Stop reason: HOSPADM

## 2022-03-27 RX ORDER — GLUCAGON 1 MG
1 KIT INJECTION
Status: DISCONTINUED | OUTPATIENT
Start: 2022-03-27 | End: 2022-03-28 | Stop reason: HOSPADM

## 2022-03-27 RX ORDER — ACETAMINOPHEN 325 MG/1
650 TABLET ORAL EVERY 6 HOURS PRN
Status: DISCONTINUED | OUTPATIENT
Start: 2022-03-27 | End: 2022-03-28 | Stop reason: HOSPADM

## 2022-03-27 RX ORDER — AMOXICILLIN 250 MG
1 CAPSULE ORAL 2 TIMES DAILY
Status: DISCONTINUED | OUTPATIENT
Start: 2022-03-27 | End: 2022-03-28 | Stop reason: HOSPADM

## 2022-03-27 RX ORDER — ACETAMINOPHEN 325 MG/1
650 TABLET ORAL EVERY 4 HOURS PRN
Status: DISCONTINUED | OUTPATIENT
Start: 2022-03-27 | End: 2022-03-28 | Stop reason: HOSPADM

## 2022-03-27 RX ORDER — ASPIRIN 81 MG/1
81 TABLET ORAL DAILY
Status: DISCONTINUED | OUTPATIENT
Start: 2022-03-28 | End: 2022-03-28 | Stop reason: HOSPADM

## 2022-03-27 RX ORDER — DIPHENHYDRAMINE HCL 25 MG
25 TABLET ORAL NIGHTLY PRN
Status: ON HOLD | COMMUNITY
End: 2022-03-30 | Stop reason: HOSPADM

## 2022-03-27 RX ORDER — MORPHINE SULFATE 4 MG/ML
4 INJECTION, SOLUTION INTRAMUSCULAR; INTRAVENOUS EVERY 4 HOURS PRN
Status: DISCONTINUED | OUTPATIENT
Start: 2022-03-27 | End: 2022-03-28 | Stop reason: HOSPADM

## 2022-03-27 RX ORDER — IBUPROFEN 200 MG
24 TABLET ORAL
Status: DISCONTINUED | OUTPATIENT
Start: 2022-03-27 | End: 2022-03-28 | Stop reason: HOSPADM

## 2022-03-27 RX ORDER — GABAPENTIN 300 MG/1
600 CAPSULE ORAL 3 TIMES DAILY
Refills: 3 | Status: DISCONTINUED | OUTPATIENT
Start: 2022-03-28 | End: 2022-03-28 | Stop reason: HOSPADM

## 2022-03-27 RX ADMIN — BUSPIRONE HYDROCHLORIDE 15 MG: 10 TABLET ORAL at 10:03

## 2022-03-27 RX ADMIN — SODIUM CHLORIDE 1000 ML: 0.9 INJECTION, SOLUTION INTRAVENOUS at 07:03

## 2022-03-27 RX ADMIN — CLINDAMYCIN PHOSPHATE 900 MG: 900 INJECTION, SOLUTION INTRAVENOUS at 07:03

## 2022-03-27 RX ADMIN — Medication 1 PATCH: at 11:03

## 2022-03-27 RX ADMIN — KETOROLAC TROMETHAMINE 15 MG: 30 INJECTION, SOLUTION INTRAMUSCULAR at 10:03

## 2022-03-27 RX ADMIN — HYDROMORPHONE HYDROCHLORIDE 1 MG: 2 INJECTION, SOLUTION INTRAMUSCULAR; INTRAVENOUS; SUBCUTANEOUS at 07:03

## 2022-03-27 RX ADMIN — DOCUSATE SODIUM AND SENNOSIDES 1 TABLET: 8.6; 5 TABLET, FILM COATED ORAL at 10:03

## 2022-03-27 RX ADMIN — ENOXAPARIN SODIUM 40 MG: 100 INJECTION SUBCUTANEOUS at 10:03

## 2022-03-27 RX ADMIN — ONDANSETRON 4 MG: 2 INJECTION INTRAMUSCULAR; INTRAVENOUS at 07:03

## 2022-03-27 NOTE — ED TRIAGE NOTES
Pt seen at outside facility for laceration to left hand. Pt states was sutured inside and out. Received TDAP and sent home to take PO antibiotics. Pt taking meds as prescribed. Pt reports decrease ROM to leeft thumb and pointer finger. Cap refill brisk. Swelling and red noted to wound.

## 2022-03-27 NOTE — ED PROVIDER NOTES
Encounter Date: 3/27/2022    SCRIBE #1 NOTE: INorberto, am scribing for, and in the presence of, Lukas Hutson MD.       History     Chief Complaint   Patient presents with    Hand Injury     Had a large laceration on right hand on Friday, had stitiches placed at [place].  Now with swelling to that hand and pain to that area.  States that he has passed out 3 times since then    Loss of Consciousness     Time seen by provider: 6:46 PM on 03/27/2022    Michael Alonzo  is a 46 y.o. male who presents to the ED with left hand pain s/p a laceration. The Pt states that he cut his hand 2 days ago with a  and was sutured at [facility] and given amoxicillin. He states that he has since had worsening right hand swelling and pain, stating he has lost consciousness and vomited due to the pain. He says that the pain shoots up his arm. The patient denies fever, chills, or any other symptoms at this time. PMHx of arthritis. PSHx of spinal fixation surgery.       The history is provided by the patient.     Review of patient's allergies indicates:   Allergen Reactions    Amitriptyline Anaphylaxis and Itching    Decadron [dexamethasone sodium phosphate] Anaphylaxis    Paroxetine Anaphylaxis    Tramadol Anaphylaxis    Tomato (solanum lycopersicum) Hives     Past Medical History:   Diagnosis Date    Arthritis     High cholesterol     Stroke     Thyroid disease      Past Surgical History:   Procedure Laterality Date    OTHER SURGICAL HISTORY      left hand    SPINAL FIXATION SURGERY       Family History   Problem Relation Age of Onset    Stroke Mother     Diabetes Father     Brain cancer Paternal Grandmother     Lung cancer Paternal Grandfather      Social History     Tobacco Use    Smoking status: Current Every Day Smoker     Packs/day: 0.50     Types: Cigarettes    Smokeless tobacco: Never Used   Substance Use Topics    Alcohol use: No    Drug use: No     Review of Systems    Constitutional: Negative for chills and fever.   HENT: Negative for sore throat.    Respiratory: Negative for shortness of breath.    Cardiovascular: Negative for chest pain.   Gastrointestinal: Positive for nausea and vomiting.   Musculoskeletal: Positive for joint swelling and myalgias. Negative for back pain.   Skin: Positive for wound. Negative for rash.   Neurological: Positive for syncope (due to pain). Negative for weakness.   Hematological: Does not bruise/bleed easily.   All other systems reviewed and are negative.      Physical Exam     Initial Vitals [03/27/22 1801]   BP Pulse Resp Temp SpO2   (!) 137/97 81 18 97.9 °F (36.6 °C) 98 %      MAP       --         Physical Exam    Nursing note and vitals reviewed.  Constitutional: He appears well-developed and well-nourished. He is not diaphoretic.  Non-toxic appearance. He does not have a sickly appearance. He does not appear ill. No distress.   HENT:   Head: Normocephalic and atraumatic.   Eyes: EOM are normal.   Neck: Neck supple.   Normal range of motion.  Cardiovascular: Normal rate, regular rhythm and normal heart sounds. Exam reveals no gallop and no friction rub.    No murmur heard.  Pulmonary/Chest: Breath sounds normal. No respiratory distress. He has no wheezes. He has no rhonchi. He has no rales.   Musculoskeletal:         General: Normal range of motion.      Cervical back: Normal range of motion and neck supple. No rigidity. Normal range of motion.     Neurological: He is alert and oriented to person, place, and time.   Skin: Skin is warm and dry. Laceration noted. No rash noted.   U-shaped laceration over dorsum of left hand with swelling, tenderness, and warmth.   Psychiatric: He has a normal mood and affect. His behavior is normal. Judgment and thought content normal.                   ED Course   Procedures  Labs Reviewed   CBC W/ AUTO DIFFERENTIAL - Abnormal; Notable for the following components:       Result Value    RBC 4.38 (*)      Hemoglobin 12.8 (*)     Hematocrit 37.7 (*)     All other components within normal limits   COMPREHENSIVE METABOLIC PANEL   SARS-COV-2 RNA AMPLIFICATION, QUAL          Imaging Results    None          Medications   HYDROmorphone (PF) injection 1 mg (1 mg Intravenous Given 3/27/22 1940)   clindamycin in D5W 900 mg/50 mL IVPB 900 mg (0 mg Intravenous Stopped 3/27/22 2026)   ondansetron injection 4 mg (4 mg Intravenous Given 3/27/22 1922)   sodium chloride 0.9% bolus 1,000 mL (0 mLs Intravenous Stopped 3/27/22 2023)     Medical Decision Making:   History:   Old Medical Records: I decided to obtain old medical records.  Clinical Tests:   Lab Tests: Ordered and Reviewed          Scribe Attestation:   Scribe #1: I performed the above scribed service and the documentation accurately describes the services I performed. I attest to the accuracy of the note.         I, Dr. Hutson, personally performed the services described in this documentation. All medical record entries made by the scribe were at my direction and in my presence.  I have reviewed the chart and agree that the record reflects my personal performance and is accurate and complete.9:03 PM 03/27/2022      ED Course as of 03/27/22 2103   Sun Mar 27, 2022   1844 SpO2: 98 % [EF]   1844 Resp: 18 [EF]   1844 Pulse: 81 [EF]   1844 Temp src: Oral [EF]   1844 Temp: 97.9 °F (36.6 °C) [EF]   1844 BP(!): 137/97 [EF]   2009 [facility] to admit for wound infection [EF]      ED Course User Index  [EF] Lukas Hutson MD             Clinical Impression:   Final diagnoses:  [T14.8XXA, L08.9] Wound infection (Primary)          ED Disposition Condition    Observation               46-year-old male presents with left hand pain and swelling at the site of her recent laceration repair.  He reports compliance with p.o. antibiotics.  Wound is swollen tender and warm consistent with an infection.  I see no drainage to suggest an abscess at this time.  He will be admitted to Hospital  Medicine for IV antibiotics.     Lukas Hutson MD  03/27/22 2103

## 2022-03-28 ENCOUNTER — HOSPITAL ENCOUNTER (INPATIENT)
Facility: OTHER | Age: 47
LOS: 2 days | Discharge: HOME | DRG: 914 | End: 2022-03-30
Attending: HOSPITALIST | Admitting: HOSPITALIST
Payer: MEDICARE

## 2022-03-28 VITALS
HEART RATE: 55 BPM | HEIGHT: 75 IN | DIASTOLIC BLOOD PRESSURE: 91 MMHG | TEMPERATURE: 98 F | RESPIRATION RATE: 19 BRPM | WEIGHT: 245.13 LBS | SYSTOLIC BLOOD PRESSURE: 140 MMHG | OXYGEN SATURATION: 98 % | BODY MASS INDEX: 30.48 KG/M2

## 2022-03-28 DIAGNOSIS — L08.9: ICD-10-CM

## 2022-03-28 DIAGNOSIS — L03.114 CELLULITIS OF HAND, LEFT: Primary | ICD-10-CM

## 2022-03-28 DIAGNOSIS — S61.412A: ICD-10-CM

## 2022-03-28 DIAGNOSIS — R00.1 BRADYCARDIA: ICD-10-CM

## 2022-03-28 DIAGNOSIS — F51.01 PRIMARY INSOMNIA: ICD-10-CM

## 2022-03-28 LAB
ALBUMIN SERPL BCP-MCNC: 3.3 G/DL (ref 3.5–5.2)
ALP SERPL-CCNC: 96 U/L (ref 55–135)
ALT SERPL W/O P-5'-P-CCNC: 29 U/L (ref 10–44)
ANION GAP SERPL CALC-SCNC: 10 MMOL/L (ref 8–16)
AST SERPL-CCNC: 30 U/L (ref 10–40)
BASOPHILS # BLD AUTO: 0.06 K/UL (ref 0–0.2)
BASOPHILS NFR BLD: 1.1 % (ref 0–1.9)
BILIRUB SERPL-MCNC: 0.3 MG/DL (ref 0.1–1)
BUN SERPL-MCNC: 11 MG/DL (ref 6–20)
CALCIUM SERPL-MCNC: 9 MG/DL (ref 8.7–10.5)
CHLORIDE SERPL-SCNC: 106 MMOL/L (ref 95–110)
CO2 SERPL-SCNC: 27 MMOL/L (ref 23–29)
CREAT SERPL-MCNC: 0.8 MG/DL (ref 0.5–1.4)
DIFFERENTIAL METHOD: ABNORMAL
EOSINOPHIL # BLD AUTO: 0.3 K/UL (ref 0–0.5)
EOSINOPHIL NFR BLD: 5.4 % (ref 0–8)
ERYTHROCYTE [DISTWIDTH] IN BLOOD BY AUTOMATED COUNT: 12.4 % (ref 11.5–14.5)
EST. GFR  (AFRICAN AMERICAN): >60 ML/MIN/1.73 M^2
EST. GFR  (NON AFRICAN AMERICAN): >60 ML/MIN/1.73 M^2
GLUCOSE SERPL-MCNC: 163 MG/DL (ref 70–110)
HCT VFR BLD AUTO: 39.3 % (ref 40–54)
HGB BLD-MCNC: 13 G/DL (ref 14–18)
IMM GRANULOCYTES # BLD AUTO: 0.01 K/UL (ref 0–0.04)
IMM GRANULOCYTES NFR BLD AUTO: 0.2 % (ref 0–0.5)
LYMPHOCYTES # BLD AUTO: 2.2 K/UL (ref 1–4.8)
LYMPHOCYTES NFR BLD: 39.9 % (ref 18–48)
MAGNESIUM SERPL-MCNC: 2.4 MG/DL (ref 1.6–2.6)
MCH RBC QN AUTO: 29 PG (ref 27–31)
MCHC RBC AUTO-ENTMCNC: 33.1 G/DL (ref 32–36)
MCV RBC AUTO: 88 FL (ref 82–98)
MONOCYTES # BLD AUTO: 0.6 K/UL (ref 0.3–1)
MONOCYTES NFR BLD: 11.5 % (ref 4–15)
NEUTROPHILS # BLD AUTO: 2.3 K/UL (ref 1.8–7.7)
NEUTROPHILS NFR BLD: 41.9 % (ref 38–73)
NRBC BLD-RTO: 0 /100 WBC
PHOSPHATE SERPL-MCNC: 4.5 MG/DL (ref 2.7–4.5)
PLATELET # BLD AUTO: 230 K/UL (ref 150–450)
PMV BLD AUTO: 10.2 FL (ref 9.2–12.9)
POCT GLUCOSE: 112 MG/DL (ref 70–110)
POCT GLUCOSE: 116 MG/DL (ref 70–110)
POCT GLUCOSE: 116 MG/DL (ref 70–110)
POCT GLUCOSE: 145 MG/DL (ref 70–110)
POCT GLUCOSE: 162 MG/DL (ref 70–110)
POTASSIUM SERPL-SCNC: 3.8 MMOL/L (ref 3.5–5.1)
PROT SERPL-MCNC: 6.5 G/DL (ref 6–8.4)
RBC # BLD AUTO: 4.48 M/UL (ref 4.6–6.2)
SODIUM SERPL-SCNC: 143 MMOL/L (ref 136–145)
WBC # BLD AUTO: 5.39 K/UL (ref 3.9–12.7)

## 2022-03-28 PROCEDURE — 96375 TX/PRO/DX INJ NEW DRUG ADDON: CPT

## 2022-03-28 PROCEDURE — 80053 COMPREHEN METABOLIC PANEL: CPT

## 2022-03-28 PROCEDURE — 99900035 HC TECH TIME PER 15 MIN (STAT)

## 2022-03-28 PROCEDURE — 96366 THER/PROPH/DIAG IV INF ADDON: CPT

## 2022-03-28 PROCEDURE — 94761 N-INVAS EAR/PLS OXIMETRY MLT: CPT

## 2022-03-28 PROCEDURE — 99223 1ST HOSP IP/OBS HIGH 75: CPT | Mod: ,,, | Performed by: NURSE PRACTITIONER

## 2022-03-28 PROCEDURE — 85025 COMPLETE CBC W/AUTO DIFF WBC: CPT

## 2022-03-28 PROCEDURE — S4991 NICOTINE PATCH NONLEGEND: HCPCS

## 2022-03-28 PROCEDURE — 96361 HYDRATE IV INFUSION ADD-ON: CPT

## 2022-03-28 PROCEDURE — G0378 HOSPITAL OBSERVATION PER HR: HCPCS

## 2022-03-28 PROCEDURE — 25000003 PHARM REV CODE 250

## 2022-03-28 PROCEDURE — 25000003 PHARM REV CODE 250: Performed by: NURSE PRACTITIONER

## 2022-03-28 PROCEDURE — 11000001 HC ACUTE MED/SURG PRIVATE ROOM

## 2022-03-28 PROCEDURE — 21400001 HC TELEMETRY ROOM

## 2022-03-28 PROCEDURE — 84100 ASSAY OF PHOSPHORUS: CPT

## 2022-03-28 PROCEDURE — 96372 THER/PROPH/DIAG INJ SC/IM: CPT

## 2022-03-28 PROCEDURE — 63600175 PHARM REV CODE 636 W HCPCS

## 2022-03-28 PROCEDURE — 99223 PR INITIAL HOSPITAL CARE,LEVL III: ICD-10-PCS | Mod: ,,, | Performed by: NURSE PRACTITIONER

## 2022-03-28 PROCEDURE — 83735 ASSAY OF MAGNESIUM: CPT

## 2022-03-28 PROCEDURE — 36415 COLL VENOUS BLD VENIPUNCTURE: CPT

## 2022-03-28 PROCEDURE — 25000003 PHARM REV CODE 250: Performed by: HOSPITALIST

## 2022-03-28 PROCEDURE — 96376 TX/PRO/DX INJ SAME DRUG ADON: CPT

## 2022-03-28 RX ORDER — IBUPROFEN 200 MG
24 TABLET ORAL
Status: DISCONTINUED | OUTPATIENT
Start: 2022-03-28 | End: 2022-03-28

## 2022-03-28 RX ORDER — ASPIRIN 81 MG/1
81 TABLET ORAL DAILY
Status: DISCONTINUED | OUTPATIENT
Start: 2022-03-29 | End: 2022-03-28

## 2022-03-28 RX ORDER — LANOLIN ALCOHOL/MO/W.PET/CERES
800 CREAM (GRAM) TOPICAL
Status: CANCELLED | OUTPATIENT
Start: 2022-03-28

## 2022-03-28 RX ORDER — ENOXAPARIN SODIUM 100 MG/ML
40 INJECTION SUBCUTANEOUS EVERY 24 HOURS
Status: CANCELLED | OUTPATIENT
Start: 2022-03-29

## 2022-03-28 RX ORDER — BUSPIRONE HYDROCHLORIDE 7.5 MG/1
15 TABLET ORAL 2 TIMES DAILY
Status: DISCONTINUED | OUTPATIENT
Start: 2022-03-28 | End: 2022-03-30 | Stop reason: HOSPADM

## 2022-03-28 RX ORDER — DOCUSATE SODIUM 100 MG/1
100 CAPSULE, LIQUID FILLED ORAL DAILY PRN
Status: DISCONTINUED | OUTPATIENT
Start: 2022-03-28 | End: 2022-03-28

## 2022-03-28 RX ORDER — DOCUSATE SODIUM 100 MG/1
100 CAPSULE, LIQUID FILLED ORAL DAILY PRN
Status: CANCELLED | OUTPATIENT
Start: 2022-03-28

## 2022-03-28 RX ORDER — DULOXETIN HYDROCHLORIDE 30 MG/1
30 CAPSULE, DELAYED RELEASE ORAL DAILY
Status: CANCELLED | OUTPATIENT
Start: 2022-03-29

## 2022-03-28 RX ORDER — IPRATROPIUM BROMIDE AND ALBUTEROL SULFATE 2.5; .5 MG/3ML; MG/3ML
3 SOLUTION RESPIRATORY (INHALATION) EVERY 6 HOURS PRN
Status: CANCELLED | OUTPATIENT
Start: 2022-03-28

## 2022-03-28 RX ORDER — ACETAMINOPHEN 325 MG/1
650 TABLET ORAL EVERY 4 HOURS PRN
Status: DISCONTINUED | OUTPATIENT
Start: 2022-03-28 | End: 2022-03-30 | Stop reason: HOSPADM

## 2022-03-28 RX ORDER — DOCUSATE SODIUM 100 MG/1
100 CAPSULE, LIQUID FILLED ORAL DAILY PRN
Status: DISCONTINUED | OUTPATIENT
Start: 2022-03-28 | End: 2022-03-30 | Stop reason: HOSPADM

## 2022-03-28 RX ORDER — TALC
9 POWDER (GRAM) TOPICAL NIGHTLY PRN
Status: DISCONTINUED | OUTPATIENT
Start: 2022-03-28 | End: 2022-03-30 | Stop reason: HOSPADM

## 2022-03-28 RX ORDER — ACETAMINOPHEN 325 MG/1
650 TABLET ORAL EVERY 6 HOURS PRN
Status: CANCELLED | OUTPATIENT
Start: 2022-03-28

## 2022-03-28 RX ORDER — GLUCAGON 1 MG
1 KIT INJECTION
Status: DISCONTINUED | OUTPATIENT
Start: 2022-03-28 | End: 2022-03-30 | Stop reason: HOSPADM

## 2022-03-28 RX ORDER — INSULIN ASPART 100 [IU]/ML
1-10 INJECTION, SOLUTION INTRAVENOUS; SUBCUTANEOUS EVERY 6 HOURS PRN
Status: DISCONTINUED | OUTPATIENT
Start: 2022-03-28 | End: 2022-03-30 | Stop reason: HOSPADM

## 2022-03-28 RX ORDER — MORPHINE SULFATE 2 MG/ML
4 INJECTION, SOLUTION INTRAMUSCULAR; INTRAVENOUS EVERY 4 HOURS PRN
Status: CANCELLED | OUTPATIENT
Start: 2022-03-28

## 2022-03-28 RX ORDER — MORPHINE SULFATE 4 MG/ML
4 INJECTION, SOLUTION INTRAMUSCULAR; INTRAVENOUS EVERY 4 HOURS PRN
Status: DISCONTINUED | OUTPATIENT
Start: 2022-03-28 | End: 2022-03-28

## 2022-03-28 RX ORDER — LANOLIN ALCOHOL/MO/W.PET/CERES
800 CREAM (GRAM) TOPICAL
Status: DISCONTINUED | OUTPATIENT
Start: 2022-03-28 | End: 2022-03-30 | Stop reason: HOSPADM

## 2022-03-28 RX ORDER — SODIUM CHLORIDE 9 MG/ML
INJECTION, SOLUTION INTRAVENOUS CONTINUOUS
Status: DISCONTINUED | OUTPATIENT
Start: 2022-03-28 | End: 2022-03-28 | Stop reason: HOSPADM

## 2022-03-28 RX ORDER — DULOXETIN HYDROCHLORIDE 30 MG/1
30 CAPSULE, DELAYED RELEASE ORAL DAILY
Status: DISCONTINUED | OUTPATIENT
Start: 2022-03-29 | End: 2022-03-29

## 2022-03-28 RX ORDER — GABAPENTIN 300 MG/1
600 CAPSULE ORAL 3 TIMES DAILY
Status: DISCONTINUED | OUTPATIENT
Start: 2022-03-29 | End: 2022-03-30 | Stop reason: HOSPADM

## 2022-03-28 RX ORDER — CLINDAMYCIN PHOSPHATE 900 MG/50ML
900 INJECTION, SOLUTION INTRAVENOUS
Status: CANCELLED | OUTPATIENT
Start: 2022-03-28

## 2022-03-28 RX ORDER — ACETAMINOPHEN 325 MG/1
650 TABLET ORAL EVERY 6 HOURS PRN
Status: DISCONTINUED | OUTPATIENT
Start: 2022-03-28 | End: 2022-03-28

## 2022-03-28 RX ORDER — SODIUM CHLORIDE 9 MG/ML
INJECTION, SOLUTION INTRAVENOUS CONTINUOUS
Status: CANCELLED | OUTPATIENT
Start: 2022-03-28

## 2022-03-28 RX ORDER — SODIUM CHLORIDE 9 MG/ML
INJECTION, SOLUTION INTRAVENOUS CONTINUOUS
Status: DISCONTINUED | OUTPATIENT
Start: 2022-03-28 | End: 2022-03-28

## 2022-03-28 RX ORDER — HYDROCODONE BITARTRATE AND ACETAMINOPHEN 10; 325 MG/1; MG/1
1 TABLET ORAL EVERY 6 HOURS PRN
Status: CANCELLED | OUTPATIENT
Start: 2022-03-28

## 2022-03-28 RX ORDER — DULOXETIN HYDROCHLORIDE 30 MG/1
30 CAPSULE, DELAYED RELEASE ORAL DAILY
Status: DISCONTINUED | OUTPATIENT
Start: 2022-03-29 | End: 2022-03-28

## 2022-03-28 RX ORDER — AMOXICILLIN 250 MG
1 CAPSULE ORAL 2 TIMES DAILY
Status: CANCELLED | OUTPATIENT
Start: 2022-03-28

## 2022-03-28 RX ORDER — CLINDAMYCIN PHOSPHATE 900 MG/50ML
900 INJECTION, SOLUTION INTRAVENOUS
Status: DISCONTINUED | OUTPATIENT
Start: 2022-03-29 | End: 2022-03-30 | Stop reason: HOSPADM

## 2022-03-28 RX ORDER — BUSPIRONE HYDROCHLORIDE 7.5 MG/1
15 TABLET ORAL 2 TIMES DAILY
Status: DISCONTINUED | OUTPATIENT
Start: 2022-03-29 | End: 2022-03-28

## 2022-03-28 RX ORDER — IBUPROFEN 200 MG
1 TABLET ORAL DAILY
Status: DISCONTINUED | OUTPATIENT
Start: 2022-03-29 | End: 2022-03-30 | Stop reason: HOSPADM

## 2022-03-28 RX ORDER — INSULIN ASPART 100 [IU]/ML
0-5 INJECTION, SOLUTION INTRAVENOUS; SUBCUTANEOUS
Status: DISCONTINUED | OUTPATIENT
Start: 2022-03-28 | End: 2022-03-28

## 2022-03-28 RX ORDER — TALC
9 POWDER (GRAM) TOPICAL NIGHTLY PRN
Status: CANCELLED | OUTPATIENT
Start: 2022-03-28

## 2022-03-28 RX ORDER — ACETAMINOPHEN 325 MG/1
650 TABLET ORAL EVERY 4 HOURS PRN
Status: DISCONTINUED | OUTPATIENT
Start: 2022-03-28 | End: 2022-03-28

## 2022-03-28 RX ORDER — IPRATROPIUM BROMIDE AND ALBUTEROL SULFATE 2.5; .5 MG/3ML; MG/3ML
3 SOLUTION RESPIRATORY (INHALATION) EVERY 6 HOURS PRN
Status: DISCONTINUED | OUTPATIENT
Start: 2022-03-28 | End: 2022-03-28

## 2022-03-28 RX ORDER — NALOXONE HCL 0.4 MG/ML
0.02 VIAL (ML) INJECTION
Status: DISCONTINUED | OUTPATIENT
Start: 2022-03-28 | End: 2022-03-29

## 2022-03-28 RX ORDER — ACETAMINOPHEN 325 MG/1
650 TABLET ORAL EVERY 4 HOURS PRN
Status: CANCELLED | OUTPATIENT
Start: 2022-03-28

## 2022-03-28 RX ORDER — LEVOTHYROXINE SODIUM 25 UG/1
25 TABLET ORAL DAILY
Status: DISCONTINUED | OUTPATIENT
Start: 2022-03-29 | End: 2022-03-28

## 2022-03-28 RX ORDER — TRAZODONE HYDROCHLORIDE 50 MG/1
50 TABLET ORAL NIGHTLY PRN
Status: DISCONTINUED | OUTPATIENT
Start: 2022-03-28 | End: 2022-03-28

## 2022-03-28 RX ORDER — ATORVASTATIN CALCIUM 20 MG/1
80 TABLET, FILM COATED ORAL DAILY
Status: DISCONTINUED | OUTPATIENT
Start: 2022-03-29 | End: 2022-03-28

## 2022-03-28 RX ORDER — IBUPROFEN 200 MG
24 TABLET ORAL
Status: CANCELLED | OUTPATIENT
Start: 2022-03-28

## 2022-03-28 RX ORDER — LEVOTHYROXINE SODIUM 25 UG/1
25 TABLET ORAL DAILY
Status: CANCELLED | OUTPATIENT
Start: 2022-03-29

## 2022-03-28 RX ORDER — AMOXICILLIN 250 MG
1 CAPSULE ORAL 2 TIMES DAILY
Status: DISCONTINUED | OUTPATIENT
Start: 2022-03-29 | End: 2022-03-28

## 2022-03-28 RX ORDER — ATORVASTATIN CALCIUM 40 MG/1
80 TABLET, FILM COATED ORAL DAILY
Status: CANCELLED | OUTPATIENT
Start: 2022-03-29

## 2022-03-28 RX ORDER — ATORVASTATIN CALCIUM 20 MG/1
80 TABLET, FILM COATED ORAL DAILY
Refills: 3 | Status: DISCONTINUED | OUTPATIENT
Start: 2022-03-29 | End: 2022-03-30 | Stop reason: HOSPADM

## 2022-03-28 RX ORDER — NALOXONE HCL 0.4 MG/ML
0.02 VIAL (ML) INJECTION
Status: CANCELLED | OUTPATIENT
Start: 2022-03-28

## 2022-03-28 RX ORDER — IBUPROFEN 200 MG
16 TABLET ORAL
Status: CANCELLED | OUTPATIENT
Start: 2022-03-28

## 2022-03-28 RX ORDER — GABAPENTIN 300 MG/1
600 CAPSULE ORAL 3 TIMES DAILY
Status: CANCELLED | OUTPATIENT
Start: 2022-03-28

## 2022-03-28 RX ORDER — HYDROCODONE BITARTRATE AND ACETAMINOPHEN 10; 325 MG/1; MG/1
1 TABLET ORAL EVERY 6 HOURS PRN
Status: DISCONTINUED | OUTPATIENT
Start: 2022-03-28 | End: 2022-03-30

## 2022-03-28 RX ORDER — ASPIRIN 81 MG/1
81 TABLET ORAL DAILY
Status: CANCELLED | OUTPATIENT
Start: 2022-03-29

## 2022-03-28 RX ORDER — IBUPROFEN 200 MG
1 TABLET ORAL DAILY
Status: CANCELLED | OUTPATIENT
Start: 2022-03-29

## 2022-03-28 RX ORDER — SODIUM CHLORIDE 0.9 % (FLUSH) 0.9 %
10 SYRINGE (ML) INJECTION EVERY 12 HOURS PRN
Status: DISCONTINUED | OUTPATIENT
Start: 2022-03-28 | End: 2022-03-30 | Stop reason: HOSPADM

## 2022-03-28 RX ORDER — GLUCAGON 1 MG
1 KIT INJECTION
Status: CANCELLED | OUTPATIENT
Start: 2022-03-28

## 2022-03-28 RX ORDER — ENOXAPARIN SODIUM 100 MG/ML
40 INJECTION SUBCUTANEOUS EVERY 24 HOURS
Status: DISCONTINUED | OUTPATIENT
Start: 2022-03-29 | End: 2022-03-28

## 2022-03-28 RX ORDER — GABAPENTIN 300 MG/1
600 CAPSULE ORAL 3 TIMES DAILY
Status: DISCONTINUED | OUTPATIENT
Start: 2022-03-29 | End: 2022-03-28

## 2022-03-28 RX ORDER — SODIUM CHLORIDE 0.9 % (FLUSH) 0.9 %
10 SYRINGE (ML) INJECTION EVERY 12 HOURS PRN
Status: CANCELLED | OUTPATIENT
Start: 2022-03-28

## 2022-03-28 RX ORDER — ONDANSETRON 2 MG/ML
4 INJECTION INTRAMUSCULAR; INTRAVENOUS EVERY 6 HOURS PRN
Status: DISCONTINUED | OUTPATIENT
Start: 2022-03-28 | End: 2022-03-30 | Stop reason: HOSPADM

## 2022-03-28 RX ORDER — LEVOTHYROXINE SODIUM 25 UG/1
25 TABLET ORAL DAILY
Status: DISCONTINUED | OUTPATIENT
Start: 2022-03-29 | End: 2022-03-30 | Stop reason: HOSPADM

## 2022-03-28 RX ORDER — GLUCAGON 1 MG
1 KIT INJECTION
Status: DISCONTINUED | OUTPATIENT
Start: 2022-03-28 | End: 2022-03-28

## 2022-03-28 RX ORDER — ONDANSETRON 2 MG/ML
4 INJECTION INTRAMUSCULAR; INTRAVENOUS EVERY 6 HOURS PRN
Status: CANCELLED | OUTPATIENT
Start: 2022-03-28

## 2022-03-28 RX ORDER — INSULIN ASPART 100 [IU]/ML
0-5 INJECTION, SOLUTION INTRAVENOUS; SUBCUTANEOUS
Status: CANCELLED | OUTPATIENT
Start: 2022-03-28

## 2022-03-28 RX ORDER — IBUPROFEN 200 MG
16 TABLET ORAL
Status: DISCONTINUED | OUTPATIENT
Start: 2022-03-28 | End: 2022-03-28

## 2022-03-28 RX ADMIN — HYDROCODONE BITARTRATE AND ACETAMINOPHEN 1 TABLET: 10; 325 TABLET ORAL at 05:03

## 2022-03-28 RX ADMIN — DULOXETINE HYDROCHLORIDE 30 MG: 30 CAPSULE, DELAYED RELEASE ORAL at 09:03

## 2022-03-28 RX ADMIN — CLINDAMYCIN PHOSPHATE 900 MG: 900 INJECTION, SOLUTION INTRAVENOUS at 08:03

## 2022-03-28 RX ADMIN — ENOXAPARIN SODIUM 40 MG: 100 INJECTION SUBCUTANEOUS at 05:03

## 2022-03-28 RX ADMIN — GABAPENTIN 600 MG: 300 CAPSULE ORAL at 09:03

## 2022-03-28 RX ADMIN — HYDROCODONE BITARTRATE AND ACETAMINOPHEN 1 TABLET: 10; 325 TABLET ORAL at 12:03

## 2022-03-28 RX ADMIN — MORPHINE SULFATE 4 MG: 4 INJECTION INTRAVENOUS at 03:03

## 2022-03-28 RX ADMIN — CLINDAMYCIN PHOSPHATE 900 MG: 900 INJECTION, SOLUTION INTRAVENOUS at 03:03

## 2022-03-28 RX ADMIN — BUSPIRONE HYDROCHLORIDE 15 MG: 10 TABLET ORAL at 08:03

## 2022-03-28 RX ADMIN — HYDROCODONE BITARTRATE AND ACETAMINOPHEN 1 TABLET: 10; 325 TABLET ORAL at 10:03

## 2022-03-28 RX ADMIN — MORPHINE SULFATE 4 MG: 4 INJECTION INTRAVENOUS at 07:03

## 2022-03-28 RX ADMIN — ASPIRIN 81 MG: 81 TABLET, COATED ORAL at 09:03

## 2022-03-28 RX ADMIN — GABAPENTIN 600 MG: 300 CAPSULE ORAL at 08:03

## 2022-03-28 RX ADMIN — ATORVASTATIN CALCIUM 80 MG: 40 TABLET, FILM COATED ORAL at 09:03

## 2022-03-28 RX ADMIN — MORPHINE SULFATE 4 MG: 4 INJECTION INTRAVENOUS at 06:03

## 2022-03-28 RX ADMIN — SODIUM CHLORIDE: 0.9 INJECTION, SOLUTION INTRAVENOUS at 03:03

## 2022-03-28 RX ADMIN — Medication 1 PATCH: at 12:03

## 2022-03-28 RX ADMIN — GABAPENTIN 600 MG: 300 CAPSULE ORAL at 03:03

## 2022-03-28 RX ADMIN — DOCUSATE SODIUM AND SENNOSIDES 1 TABLET: 8.6; 5 TABLET, FILM COATED ORAL at 09:03

## 2022-03-28 RX ADMIN — MORPHINE SULFATE 4 MG: 4 INJECTION INTRAVENOUS at 01:03

## 2022-03-28 RX ADMIN — CLINDAMYCIN PHOSPHATE 900 MG: 900 INJECTION, SOLUTION INTRAVENOUS at 12:03

## 2022-03-28 RX ADMIN — LEVOTHYROXINE SODIUM 25 MCG: 0.03 TABLET ORAL at 09:03

## 2022-03-28 RX ADMIN — BUSPIRONE HYDROCHLORIDE 15 MG: 10 TABLET ORAL at 09:03

## 2022-03-28 RX ADMIN — DOCUSATE SODIUM AND SENNOSIDES 1 TABLET: 8.6; 5 TABLET, FILM COATED ORAL at 08:03

## 2022-03-28 NOTE — SUBJECTIVE & OBJECTIVE
Past Medical History:   Diagnosis Date    Arthritis     High cholesterol     Stroke     Thyroid disease        Past Surgical History:   Procedure Laterality Date    OTHER SURGICAL HISTORY      left hand    SPINAL FIXATION SURGERY         Review of patient's allergies indicates:   Allergen Reactions    Amitriptyline Anaphylaxis and Itching    Decadron [dexamethasone sodium phosphate] Anaphylaxis    Paroxetine Anaphylaxis    Tramadol Anaphylaxis    Tomato (solanum lycopersicum) Hives       No current facility-administered medications on file prior to encounter.     Current Outpatient Medications on File Prior to Encounter   Medication Sig    albuterol (PROAIR HFA) 90 mcg/actuation inhaler Inhale 2 puffs into the lungs every 6 (six) hours as needed for Wheezing. Rescue    aspirin (ECOTRIN) 81 MG EC tablet Take 1 tablet by mouth once daily.    busPIRone (BUSPAR) 15 MG tablet Take 1 tablet (15 mg total) by mouth 2 (two) times daily.    celecoxib (CELEBREX) 200 MG capsule TAKE ONE CAPSULE BY MOUTH ONCE DAILY    diclofenac sodium (VOLTAREN) 1 % Gel Apply topically 4 (four) times daily.    diphenhydrAMINE-acetaminophen (TYLENOL PM EXTRA STRENGTH)  mg Tab Tylenol PM   2 HS    docusate sodium (STOOL SOFTENER) 100 MG capsule Take 1 capsule by mouth daily as needed.    DULoxetine (CYMBALTA) 30 MG capsule TAKE ONE CAPSULE BY MOUTH ONCE DAILY    fluticasone propionate (FLONASE) 50 mcg/actuation nasal spray 1 spray by Each Nostril route daily as needed.    gabapentin (NEURONTIN) 600 MG tablet TAKE ONE TABLET BY MOUTH THREE TIMES DAILY    HYDROcodone-acetaminophen (NORCO)  mg per tablet Take 1 tablet by mouth every 4 (four) hours as needed for Pain.    levothyroxine (SYNTHROID) 25 MCG tablet TAKE ONE TABLET BY MOUTH ONCE DAILY    metFORMIN (GLUCOPHAGE) 1000 MG tablet TAKE ONE TABLET BY MOUTH ONCE DAILY    methocarbamoL (ROBAXIN) 500 MG Tab Take 500 mg by mouth 3 (three) times daily.    mupirocin (BACTROBAN) 2 % ointment  Apply topically 3 (three) times daily.    POLYETHYLENE GLYCOL 3350 ORAL 1 Dose by NOT APPLICABLE route.    rosuvastatin (CRESTOR) 20 MG tablet TAKE ONE TABLET BY MOUTH ONCE DAILY    traZODone (DESYREL) 50 MG tablet TAKE ONE TABLET BY MOUTH NIGHTLY AS NEEDED FOR INSOMNIA     Family History       Problem Relation (Age of Onset)    Brain cancer Paternal Grandmother    Diabetes Father    Lung cancer Paternal Grandfather    Stroke Mother          Tobacco Use    Smoking status: Current Every Day Smoker     Packs/day: 0.50     Types: Cigarettes    Smokeless tobacco: Never Used   Substance and Sexual Activity    Alcohol use: No    Drug use: No    Sexual activity: Yes     Partners: Female     Review of Systems   Constitutional:  Positive for appetite change. Negative for chills (decreased), fatigue and fever.   HENT:  Positive for congestion. Negative for sore throat and trouble swallowing.    Respiratory:  Negative for cough, shortness of breath and wheezing.    Cardiovascular:  Positive for palpitations (intermitttent; resolved). Negative for chest pain and leg swelling.   Gastrointestinal:  Positive for nausea and vomiting. Negative for abdominal pain.   Genitourinary:  Negative for difficulty urinating, dysuria and hematuria.   Musculoskeletal:  Positive for myalgias.        Left hand pain     Skin:  Positive for wound.   Neurological:  Positive for light-headedness (resolved). Negative for dizziness and headaches.   Psychiatric/Behavioral:  Negative for confusion.    Objective:     Vital Signs (Most Recent):  Temp: 97.9 °F (36.6 °C) (03/27/22 1801)  Pulse: 81 (03/27/22 1801)  Resp: 18 (03/27/22 1940)  BP: 137/65 (03/27/22 1941)  SpO2: 98 % (03/27/22 1801) Vital Signs (24h Range):  Temp:  [97.9 °F (36.6 °C)] 97.9 °F (36.6 °C)  Pulse:  [81] 81  Resp:  [18] 18  SpO2:  [98 %] 98 %  BP: (137)/(65-97) 137/65     Weight: 108.9 kg (240 lb)  Body mass index is 30 kg/m².    Physical Exam  Vitals and nursing note reviewed.    Constitutional:       General: He is not in acute distress.     Appearance: Normal appearance. He is obese.   HENT:      Head: Normocephalic and atraumatic.      Mouth/Throat:      Mouth: Mucous membranes are moist.      Pharynx: Oropharynx is clear.   Eyes:      Extraocular Movements: Extraocular movements intact.      Pupils: Pupils are equal, round, and reactive to light.   Cardiovascular:      Rate and Rhythm: Normal rate and regular rhythm.      Pulses: Normal pulses.      Heart sounds: Normal heart sounds.   Pulmonary:      Effort: Pulmonary effort is normal. No respiratory distress.      Breath sounds: Normal breath sounds. No wheezing, rhonchi or rales.   Abdominal:      General: Abdomen is flat. Bowel sounds are normal. There is no distension.      Palpations: Abdomen is soft.      Tenderness: There is no abdominal tenderness.   Musculoskeletal:         General: Normal range of motion.      Cervical back: Normal range of motion and neck supple.   Skin:     General: Skin is warm.      Capillary Refill: Capillary refill takes 2 to 3 seconds.      Comments: Warmth, edema, erythema and tenderness noted to left hand. No drainage or fluctuance. See photo.    Neurological:      General: No focal deficit present.      Mental Status: He is alert and oriented to person, place, and time. Mental status is at baseline.   Psychiatric:         Mood and Affect: Mood normal.         Behavior: Behavior normal.         CRANIAL NERVES     CN III, IV, VI   Pupils are equal, round, and reactive to light.             Significant Labs: All pertinent labs within the past 24 hours have been reviewed.  CBC:   Recent Labs   Lab 03/27/22 1934   WBC 6.30   HGB 12.8*   HCT 37.7*        CMP:   Recent Labs   Lab 03/27/22 1934      K 3.8      CO2 26      BUN 10   CREATININE 0.8   CALCIUM 9.2   PROT 6.8   ALBUMIN 3.5   BILITOT 0.5   ALKPHOS 88   AST 35   ALT 28   ANIONGAP 9   EGFRNONAA >60

## 2022-03-28 NOTE — H&P
[facility] Hospital Medicine  History & Physical    Patient Name: Michael Alonzo Sr.  MRN: [id number]  Patient Class: OP- Observation  Admission Date: 3/27/2022  Attending Physician: Elo Arnold MD   Primary Care Provider: Kirstal Warren MD         Patient information was obtained from patient, relative(s), past medical records and ER records.     Subjective:     Principal Problem:Cellulitis of left upper extremity    Chief Complaint:   Chief Complaint   Patient presents with    Hand Injury     Had a large laceration on right hand on Friday, had stitiches placed at [place].  Now with swelling to that hand and pain to that area.  States that he has passed out 3 times since then    Loss of Consciousness        HPI: Michael Alonzo Sr. Is a 46 y.o. male with PMHx of T2DM, HTN, HLD, and hypothyroidism who presented to ED with increased pain to left hand s/p laceration repair. Patient cut his hand with a  on 3/25 and presented to [facility] where laceration was sutured. He was prescribed Amoxicillin and reports compliance. Tetanus UTD. He reports increased pain and swelling yesterday and noted erythema today. Denies drainage. He describes the pain as a sharp, stabbing sensation. He reports exacerbating factors include dangling arm by side while standing. Denies alleviating factors. Pain radiates up forearm. Pain was 10/10 at its worst and reports improvement with pain medications. He also reports n/v (5-6 episodes) which he attributes 2/2 pain, decreased appetite, intermittent palpitations, and LOC 2/2 pain at time of laceration. He denies CP, SOB, fevers, chills, and diarrhea. ED workup is significant for mild anemia and cellulitis. The patient was placed in observation under the care of Hospital Medicine.             Past Medical History:   Diagnosis Date    Arthritis     High cholesterol     Stroke     Thyroid disease        Past Surgical History:   Procedure  Laterality Date    OTHER SURGICAL HISTORY      left hand    SPINAL FIXATION SURGERY         Review of patient's allergies indicates:   Allergen Reactions    Amitriptyline Anaphylaxis and Itching    Decadron [dexamethasone sodium phosphate] Anaphylaxis    Paroxetine Anaphylaxis    Tramadol Anaphylaxis    Tomato (solanum lycopersicum) Hives       No current facility-administered medications on file prior to encounter.     Current Outpatient Medications on File Prior to Encounter   Medication Sig    albuterol (PROAIR HFA) 90 mcg/actuation inhaler Inhale 2 puffs into the lungs every 6 (six) hours as needed for Wheezing. Rescue    aspirin (ECOTRIN) 81 MG EC tablet Take 1 tablet by mouth once daily.    busPIRone (BUSPAR) 15 MG tablet Take 1 tablet (15 mg total) by mouth 2 (two) times daily.    celecoxib (CELEBREX) 200 MG capsule TAKE ONE CAPSULE BY MOUTH ONCE DAILY    diclofenac sodium (VOLTAREN) 1 % Gel Apply topically 4 (four) times daily.    diphenhydrAMINE-acetaminophen (TYLENOL PM EXTRA STRENGTH)  mg Tab Tylenol PM   2 HS    docusate sodium (STOOL SOFTENER) 100 MG capsule Take 1 capsule by mouth daily as needed.    DULoxetine (CYMBALTA) 30 MG capsule TAKE ONE CAPSULE BY MOUTH ONCE DAILY    fluticasone propionate (FLONASE) 50 mcg/actuation nasal spray 1 spray by Each Nostril route daily as needed.    gabapentin (NEURONTIN) 600 MG tablet TAKE ONE TABLET BY MOUTH THREE TIMES DAILY    HYDROcodone-acetaminophen (NORCO)  mg per tablet Take 1 tablet by mouth every 4 (four) hours as needed for Pain.    levothyroxine (SYNTHROID) 25 MCG tablet TAKE ONE TABLET BY MOUTH ONCE DAILY    metFORMIN (GLUCOPHAGE) 1000 MG tablet TAKE ONE TABLET BY MOUTH ONCE DAILY    methocarbamoL (ROBAXIN) 500 MG Tab Take 500 mg by mouth 3 (three) times daily.    mupirocin (BACTROBAN) 2 % ointment Apply topically 3 (three) times daily.    POLYETHYLENE GLYCOL 3350 ORAL 1 Dose by NOT APPLICABLE route.    rosuvastatin  (CRESTOR) 20 MG tablet TAKE ONE TABLET BY MOUTH ONCE DAILY    traZODone (DESYREL) 50 MG tablet TAKE ONE TABLET BY MOUTH NIGHTLY AS NEEDED FOR INSOMNIA     Family History       Problem Relation (Age of Onset)    Brain cancer Paternal Grandmother    Diabetes Father    Lung cancer Paternal Grandfather    Stroke Mother          Tobacco Use    Smoking status: Current Every Day Smoker     Packs/day: 0.50     Types: Cigarettes    Smokeless tobacco: Never Used   Substance and Sexual Activity    Alcohol use: No    Drug use: No    Sexual activity: Yes     Partners: Female     Review of Systems   Constitutional:  Positive for appetite change. Negative for chills (decreased), fatigue and fever.   HENT:  Positive for congestion. Negative for sore throat and trouble swallowing.    Respiratory:  Negative for cough, shortness of breath and wheezing.    Cardiovascular:  Positive for palpitations (intermitttent; resolved). Negative for chest pain and leg swelling.   Gastrointestinal:  Positive for nausea and vomiting. Negative for abdominal pain.   Genitourinary:  Negative for difficulty urinating, dysuria and hematuria.   Musculoskeletal:  Positive for myalgias.        Left hand pain     Skin:  Positive for wound.   Neurological:  Positive for light-headedness (resolved). Negative for dizziness and headaches.   Psychiatric/Behavioral:  Negative for confusion.    Objective:     Vital Signs (Most Recent):  Temp: 97.9 °F (36.6 °C) (03/27/22 1801)  Pulse: 81 (03/27/22 1801)  Resp: 18 (03/27/22 1940)  BP: 137/65 (03/27/22 1941)  SpO2: 98 % (03/27/22 1801) Vital Signs (24h Range):  Temp:  [97.9 °F (36.6 °C)] 97.9 °F (36.6 °C)  Pulse:  [81] 81  Resp:  [18] 18  SpO2:  [98 %] 98 %  BP: (137)/(65-97) 137/65     Weight: 108.9 kg (240 lb)  Body mass index is 30 kg/m².    Physical Exam  Vitals and nursing note reviewed.   Constitutional:       General: He is not in acute distress.     Appearance: Normal appearance. He is obese.   HENT:       Head: Normocephalic and atraumatic.      Mouth/Throat:      Mouth: Mucous membranes are moist.      Pharynx: Oropharynx is clear.   Eyes:      Extraocular Movements: Extraocular movements intact.      Pupils: Pupils are equal, round, and reactive to light.   Cardiovascular:      Rate and Rhythm: Normal rate and regular rhythm.      Pulses: Normal pulses.      Heart sounds: Normal heart sounds.   Pulmonary:      Effort: Pulmonary effort is normal. No respiratory distress.      Breath sounds: Normal breath sounds. No wheezing, rhonchi or rales.   Abdominal:      General: Abdomen is flat. Bowel sounds are normal. There is no distension.      Palpations: Abdomen is soft.      Tenderness: There is no abdominal tenderness.   Musculoskeletal:         General: Normal range of motion.      Cervical back: Normal range of motion and neck supple.   Skin:     General: Skin is warm.      Capillary Refill: Capillary refill takes 2 to 3 seconds.      Comments: Warmth, edema, erythema and tenderness noted to left hand. No drainage or fluctuance. See photo.    Neurological:      General: No focal deficit present.      Mental Status: He is alert and oriented to person, place, and time. Mental status is at baseline.   Psychiatric:         Mood and Affect: Mood normal.         Behavior: Behavior normal.         CRANIAL NERVES     CN III, IV, VI   Pupils are equal, round, and reactive to light.             Significant Labs: All pertinent labs within the past 24 hours have been reviewed.  CBC:   Recent Labs   Lab 03/27/22 1934   WBC 6.30   HGB 12.8*   HCT 37.7*        CMP:   Recent Labs   Lab 03/27/22 1934      K 3.8      CO2 26      BUN 10   CREATININE 0.8   CALCIUM 9.2   PROT 6.8   ALBUMIN 3.5   BILITOT 0.5   ALKPHOS 88   AST 35   ALT 28   ANIONGAP 9   EGFRNONAA >60           Assessment/Plan:     * Cellulitis of left upper extremity  Acute:  - s/p cutting hand on a  3/25  -Tetanus UTD  - IV abx  -  [facility]        Nicotine dependence  Dangers of cigarette smoking were reviewed with patient in detail for 10 minutes and patient was encouraged to quit. Nicotine replacement options were discussed.        Hypothyroidism  Patient has chronic hypothyroidism. TFTs reviewed-   Lab Results   Component Value Date    TSH 2.984 02/23/2021   . Will continue chronic levothyroxine and adjust for and clinical changes.        Chronic back pain  Noted.       Generalized anxiety disorder  Chronic:  - continue home medicaitons        VTE Risk Mitigation (From admission, onward)         Ordered     enoxaparin injection 40 mg  Daily         03/27/22 2119     IP VTE HIGH RISK PATIENT  Once         03/27/22 2119     Place sequential compression device  Until discontinued         03/27/22 2119                   Lashell Simpson PA-C  Department of Hospital Medicine   [facility]

## 2022-03-28 NOTE — CONSULTS
Patient with left hand incision site with sutures intact. There is gross swelling noted to the hand but there is less redness noted today as per the patient. Patient reports his pain is just as bad today as it has been. Patient reports he feels like there is fiberglass or something else inside due to the level of pain that has not improved. Patient is unable to move his index finger at this assessment. Painted the left hand with betadine along the suture line and left open to air due to pain and swelling patient will not likely tolerate a dressing over the sutures. Patient encouraged to elevate the left hand as much as possible.           Left hand

## 2022-03-28 NOTE — ASSESSMENT & PLAN NOTE
Patient has chronic hypothyroidism. TFTs reviewed-   Lab Results   Component Value Date    TSH 2.984 02/23/2021   . Will continue chronic levothyroxine and adjust for and clinical changes.

## 2022-03-28 NOTE — SUBJECTIVE & OBJECTIVE
Interval History: Patient seen and examined. Reports pain is controlled. Reports worsening of hand swelling despite being on PO antibiotics at home. ID and ortho consulted.    Review of Systems   Constitutional:  Negative for chills, fatigue and fever.   HENT:  Negative for congestion, sore throat and trouble swallowing.    Respiratory:  Negative for cough, shortness of breath and wheezing.    Cardiovascular:  Negative for chest pain, palpitations and leg swelling.   Gastrointestinal:  Positive for nausea. Negative for abdominal pain and vomiting.   Genitourinary:  Negative for difficulty urinating, dysuria and hematuria.   Musculoskeletal:  Positive for myalgias.        Left hand pain     Skin:  Positive for wound.   Neurological:  Negative for dizziness, light-headedness and headaches.   Psychiatric/Behavioral:  Negative for confusion.    Objective:     Vital Signs (Most Recent):  Temp: 96.7 °F (35.9 °C) (03/28/22 0823)  Pulse: (!) 54 (03/28/22 0903)  Resp: 18 (03/28/22 0903)  BP: (!) 143/73 (03/28/22 0823)  SpO2: 99 % (03/28/22 0903)   Vital Signs (24h Range):  Temp:  [96.7 °F (35.9 °C)-98.3 °F (36.8 °C)] 96.7 °F (35.9 °C)  Pulse:  [54-82] 54  Resp:  [18-78] 18  SpO2:  [95 %-99 %] 99 %  BP: (129-166)/(65-97) 143/73     Weight: 111.2 kg (245 lb 2.4 oz)  Body mass index is 30.64 kg/m².    Intake/Output Summary (Last 24 hours) at 3/28/2022 1057  Last data filed at 3/28/2022 0635  Gross per 24 hour   Intake 708.15 ml   Output --   Net 708.15 ml      Physical Exam  Vitals and nursing note reviewed.   Constitutional:       General: He is not in acute distress.     Appearance: Normal appearance. He is obese.   HENT:      Head: Normocephalic and atraumatic.      Mouth/Throat:      Mouth: Mucous membranes are moist.      Pharynx: Oropharynx is clear.   Eyes:      Extraocular Movements: Extraocular movements intact.      Pupils: Pupils are equal, round, and reactive to light.   Cardiovascular:      Rate and Rhythm: Normal  rate and regular rhythm.      Pulses: Normal pulses.      Heart sounds: Normal heart sounds.   Pulmonary:      Effort: Pulmonary effort is normal. No respiratory distress.      Breath sounds: Normal breath sounds. No wheezing, rhonchi or rales.   Abdominal:      General: Abdomen is flat. Bowel sounds are normal. There is no distension.      Palpations: Abdomen is soft.      Tenderness: There is no abdominal tenderness.   Musculoskeletal:         General: Normal range of motion.      Cervical back: Normal range of motion and neck supple.   Skin:     General: Skin is warm.      Capillary Refill: Capillary refill takes 2 to 3 seconds.      Comments: Warmth, edema, erythema and tenderness noted to left hand surrounding stitches.. No drainage or fluctuance. Decreased sensation and movement of thumb and pointer finger   Neurological:      General: No focal deficit present.      Mental Status: He is alert and oriented to person, place, and time. Mental status is at baseline.   Psychiatric:         Mood and Affect: Mood normal.         Behavior: Behavior normal.       Significant Labs: All pertinent labs within the past 24 hours have been reviewed.    Significant Imaging: I have reviewed all pertinent imaging results/findings within the past 24 hours.

## 2022-03-28 NOTE — PLAN OF CARE
"   Outside Transfer Acceptance Note / Regional Referral Center    Referring facility: [facility]   Referring provider: JITENDRA RAND  Accepting facility: [facility] LOCATION ([facility])  Accepting provider: Evelin  Admitting provider: Jl Diego  Reason for transfer: Higher Level of Care   Transfer diagnosis: hand infection  Transfer specialty requested: Hand Surgery  Transfer specialty notified: yes  Transfer level: NUMBER 1-5: 2  Bed type requested: Med-surge  Isolation status: No active isolations   Admission class or status: IP- Inpatient      Narrative      Mr. Alonzo is a 47yo man with a past medical history of DM2, HTN, HLD and hypothyroidism.  He also has a history of remote left finger amputations with residual metal in place (cannot get MRI).  He recently sustained a left hand injury this past Friday, 3/25/22, after cutting his hand on a .  He went to [place] and had stiches placed at that time, then discharged on Augmentin after local washout.       He returned to the ED at [facility] on late yesterday due to worsening cellulitis and local swelling and inflammation about the suture area.  He was placed on Clindamycin, and ID was consulted as well.  He is also developing new increased pain and decreased range of motion to the left hand, especially the thumb.     His current VS's show BP (!) 144/75 (Patient Position: Lying)   Pulse (!) 50   Temp 97.1 °F (36.2 °C) (Axillary)   Resp 16   Ht 6' 3" (1.905 m)   Wt 111.2 kg (245 lb 2.4 oz)   SpO2 98%   BMI 30.64 kg/m² .  Labs today show WBC 5.3, Hg 13, Cr 0.8, otherwise normal CMP.  Xray of the hand showed a metallic foreign body identified in the palmar surface of the left hand unchanged since at least 2016, and soft tissue swelling of the dorsum.     Local ID feels he may need re-exploration of the hand wound to rule out abscess or tenosynovitis.  Hand Surgeon at [facility], Dr Claude Williams, was contacted and is agreeable to assisting " in consultation on the case.  I asked that he be kept NPO at midnight.  He has received no Vancomycin since admission.    Objective     Vitals:   Temp:  [96.3 °F (35.7 °C)-98.3 °F (36.8 °C)] 97.1 °F (36.2 °C)  Pulse:  [50-82] 50  Resp:  [16-78] 16  SpO2:  [95 %-99 %] 98 %  BP: (123-166)/(58-81) 144/75     Recent Labs:   Recent Results (from the past 24 hour(s))   COVID-19 Rapid Screening    Collection Time: 03/27/22  6:58 PM   Result Value Ref Range    SARS-CoV-2 RNA, Amplification, Qual Negative Negative   CBC auto differential    Collection Time: 03/27/22  7:34 PM   Result Value Ref Range    WBC 6.30 3.90 - 12.70 K/uL    RBC 4.38 (L) 4.60 - 6.20 M/uL    Hemoglobin 12.8 (L) 14.0 - 18.0 g/dL    Hematocrit 37.7 (L) 40.0 - 54.0 %    MCV 86 82 - 98 fL    MCH 29.2 27.0 - 31.0 pg    MCHC 34.0 32.0 - 36.0 g/dL    RDW 12.2 11.5 - 14.5 %    Platelets 233 150 - 450 K/uL    MPV 9.3 9.2 - 12.9 fL    Immature Granulocytes 0.2 0.0 - 0.5 %    Gran # (ANC) 3.2 1.8 - 7.7 K/uL    Immature Grans (Abs) 0.01 0.00 - 0.04 K/uL    Lymph # 2.0 1.0 - 4.8 K/uL    Mono # 0.9 0.3 - 1.0 K/uL    Eos # 0.2 0.0 - 0.5 K/uL    Baso # 0.06 0.00 - 0.20 K/uL    nRBC 0 0 /100 WBC    Gran % 50.5 38.0 - 73.0 %    Lymph % 31.0 18.0 - 48.0 %    Mono % 13.5 4.0 - 15.0 %    Eosinophil % 3.8 0.0 - 8.0 %    Basophil % 1.0 0.0 - 1.9 %    Differential Method Automated    Comprehensive metabolic panel    Collection Time: 03/27/22  7:34 PM   Result Value Ref Range    Sodium 140 136 - 145 mmol/L    Potassium 3.8 3.5 - 5.1 mmol/L    Chloride 105 95 - 110 mmol/L    CO2 26 23 - 29 mmol/L    Glucose 105 70 - 110 mg/dL    BUN 10 6 - 20 mg/dL    Creatinine 0.8 0.5 - 1.4 mg/dL    Calcium 9.2 8.7 - 10.5 mg/dL    Total Protein 6.8 6.0 - 8.4 g/dL    Albumin 3.5 3.5 - 5.2 g/dL    Total Bilirubin 0.5 0.1 - 1.0 mg/dL    Alkaline Phosphatase 88 55 - 135 U/L    AST 35 10 - 40 U/L    ALT 28 10 - 44 U/L    Anion Gap 9 8 - 16 mmol/L    eGFR if African American >60 >60 mL/min/1.73 m^2     eGFR if non African American >60 >60 mL/min/1.73 m^2   POCT glucose    Collection Time: 03/27/22 10:18 PM   Result Value Ref Range    POCT Glucose 192 (H) 70 - 110 mg/dL   Comprehensive Metabolic Panel (CMP)    Collection Time: 03/28/22  5:12 AM   Result Value Ref Range    Sodium 143 136 - 145 mmol/L    Potassium 3.8 3.5 - 5.1 mmol/L    Chloride 106 95 - 110 mmol/L    CO2 27 23 - 29 mmol/L    Glucose 163 (H) 70 - 110 mg/dL    BUN 11 6 - 20 mg/dL    Creatinine 0.8 0.5 - 1.4 mg/dL    Calcium 9.0 8.7 - 10.5 mg/dL    Total Protein 6.5 6.0 - 8.4 g/dL    Albumin 3.3 (L) 3.5 - 5.2 g/dL    Total Bilirubin 0.3 0.1 - 1.0 mg/dL    Alkaline Phosphatase 96 55 - 135 U/L    AST 30 10 - 40 U/L    ALT 29 10 - 44 U/L    Anion Gap 10 8 - 16 mmol/L    eGFR if African American >60 >60 mL/min/1.73 m^2    eGFR if non African American >60 >60 mL/min/1.73 m^2   Magnesium    Collection Time: 03/28/22  5:12 AM   Result Value Ref Range    Magnesium 2.4 1.6 - 2.6 mg/dL   Phosphorus    Collection Time: 03/28/22  5:12 AM   Result Value Ref Range    Phosphorus 4.5 2.7 - 4.5 mg/dL   CBC with Automated Differential    Collection Time: 03/28/22  5:12 AM   Result Value Ref Range    WBC 5.39 3.90 - 12.70 K/uL    RBC 4.48 (L) 4.60 - 6.20 M/uL    Hemoglobin 13.0 (L) 14.0 - 18.0 g/dL    Hematocrit 39.3 (L) 40.0 - 54.0 %    MCV 88 82 - 98 fL    MCH 29.0 27.0 - 31.0 pg    MCHC 33.1 32.0 - 36.0 g/dL    RDW 12.4 11.5 - 14.5 %    Platelets 230 150 - 450 K/uL    MPV 10.2 9.2 - 12.9 fL    Immature Granulocytes 0.2 0.0 - 0.5 %    Gran # (ANC) 2.3 1.8 - 7.7 K/uL    Immature Grans (Abs) 0.01 0.00 - 0.04 K/uL    Lymph # 2.2 1.0 - 4.8 K/uL    Mono # 0.6 0.3 - 1.0 K/uL    Eos # 0.3 0.0 - 0.5 K/uL    Baso # 0.06 0.00 - 0.20 K/uL    nRBC 0 0 /100 WBC    Gran % 41.9 38.0 - 73.0 %    Lymph % 39.9 18.0 - 48.0 %    Mono % 11.5 4.0 - 15.0 %    Eosinophil % 5.4 0.0 - 8.0 %    Basophil % 1.1 0.0 - 1.9 %    Differential Method Automated    POCT glucose    Collection Time:  03/28/22  5:46 AM   Result Value Ref Range    POCT Glucose 162 (H) 70 - 110 mg/dL   POCT glucose    Collection Time: 03/28/22  7:32 AM   Result Value Ref Range    POCT Glucose 145 (H) 70 - 110 mg/dL   POCT glucose    Collection Time: 03/28/22 12:04 PM   Result Value Ref Range    POCT Glucose 112 (H) 70 - 110 mg/dL   POCT glucose    Collection Time: 03/28/22  5:08 PM   Result Value Ref Range    POCT Glucose 116 (H) 70 - 110 mg/dL        IV access:        Peripheral IV - Single Lumen 03/27/22 1908 20 G Right Forearm (Active)   Site Assessment Clean;Dry;Intact;No redness;No swelling 03/28/22 0733   Extremity Assessment Distal to IV No redness;No swelling 03/28/22 0733   Line Status Blood return noted;Flushed 03/27/22 2158   Dressing Status Clean;Dry;Intact 03/28/22 0733   Dressing Intervention Integrity maintained 03/28/22 0733   Reason Not Rotated Not due 03/27/22 2158     Infusions: None  Allergies:   Review of patient's allergies indicates:   Allergen Reactions    Amitriptyline Anaphylaxis and Itching    Decadron [dexamethasone sodium phosphate] Anaphylaxis    Paroxetine Anaphylaxis    Tramadol Anaphylaxis    Tomato (solanum lycopersicum) Hives      NPO: No      Anticoagulation:   Anticoagulants     None           Instructions      [facility]  Admit to Hospital Medicine  Upon patient arrival to floor, please contact Hospital Medicine on call.

## 2022-03-28 NOTE — CARE UPDATE
03/28/22 0903   Patient Assessment/Suction   Level of Consciousness (AVPU) alert   All Lung Fields Breath Sounds diminished   PRE-TX-O2   O2 Device (Oxygen Therapy) room air   SpO2 99 %   Pulse Oximetry Type Intermittent   $ Pulse Oximetry - Multiple Charge Pulse Oximetry - Multiple   Pulse (!) 54   Resp 18   Aerosol Therapy   $ Aerosol Therapy Charges PRN treatment not required   Respiratory Treatment Status (SVN) PRN treatment not required

## 2022-03-28 NOTE — PLAN OF CARE
POC reviewed with patient; understanding verbalized. Tele in place with NSR. PRN pain medication administered, as needed. Scheduled IV antibiotics administered. Accuchecks ACHS with no coverage needed. Free from falls and injuries this shift. Pt with nonskid footwear on, bed in lowest position, and locked with bed rails up x 2. Pt has call light and personal items within reach. VSS and afebrile this shift. All questions and concerns addressed at this time. Will continue to monitor.

## 2022-03-28 NOTE — PROGRESS NOTES
[facility] Hospital Medicine  Progress Note    Patient Name: Michael Alonzo Sr.  MRN: [id number]  Patient Class: OP- Observation   Admission Date: 3/27/2022  Length of Stay: 0 days  Attending Physician: Elo Arnold MD  Primary Care Provider: Kristal Warren MD        Subjective:     Principal Problem:Cellulitis of left upper extremity        HPI:  Michael Alonzo Sr. Is a 46 y.o. male with PMHx of T2DM, HTN, HLD, and hypothyroidism who presented to ED with increased pain to left hand s/p laceration repair. Patient cut his hand with a  on 3/25 and presented to [facility] where laceration was sutured. He was prescribed Amoxicillin and reports compliance. Tetanus UTD. He reports increased pain and swelling yesterday and noted erythema today. Denies drainage. He describes the pain as a sharp, stabbing sensation. He reports exacerbating factors include dangling arm by side while standing. Denies alleviating factors. Pain radiates up forearm. Pain was 10/10 at its worst and reports improvement with pain medications. He also reports n/v (5-6 episodes) which he attributes 2/2 pain, decreased appetite, intermittent palpitations, and LOC 2/2 pain at time of laceration. He denies CP, SOB, fevers, chills, and diarrhea. ED workup is significant for mild anemia and cellulitis. The patient was placed in observation under the care of Hospital Medicine.             Overview/Hospital Course:  No notes on file    Interval History: Patient seen and examined. Reports pain is controlled. Reports worsening of hand swelling despite being on PO antibiotics at home. ID and ortho consulted.    Review of Systems   Constitutional:  Negative for chills, fatigue and fever.   HENT:  Negative for congestion, sore throat and trouble swallowing.    Respiratory:  Negative for cough, shortness of breath and wheezing.    Cardiovascular:  Negative for chest pain, palpitations and leg swelling.    Gastrointestinal:  Positive for nausea. Negative for abdominal pain and vomiting.   Genitourinary:  Negative for difficulty urinating, dysuria and hematuria.   Musculoskeletal:  Positive for myalgias.        Left hand pain     Skin:  Positive for wound.   Neurological:  Negative for dizziness, light-headedness and headaches.   Psychiatric/Behavioral:  Negative for confusion.    Objective:     Vital Signs (Most Recent):  Temp: 96.7 °F (35.9 °C) (03/28/22 0823)  Pulse: (!) 54 (03/28/22 0903)  Resp: 18 (03/28/22 0903)  BP: (!) 143/73 (03/28/22 0823)  SpO2: 99 % (03/28/22 0903)   Vital Signs (24h Range):  Temp:  [96.7 °F (35.9 °C)-98.3 °F (36.8 °C)] 96.7 °F (35.9 °C)  Pulse:  [54-82] 54  Resp:  [18-78] 18  SpO2:  [95 %-99 %] 99 %  BP: (129-166)/(65-97) 143/73     Weight: 111.2 kg (245 lb 2.4 oz)  Body mass index is 30.64 kg/m².    Intake/Output Summary (Last 24 hours) at 3/28/2022 1057  Last data filed at 3/28/2022 0635  Gross per 24 hour   Intake 708.15 ml   Output --   Net 708.15 ml      Physical Exam  Vitals and nursing note reviewed.   Constitutional:       General: He is not in acute distress.     Appearance: Normal appearance. He is obese.   HENT:      Head: Normocephalic and atraumatic.      Mouth/Throat:      Mouth: Mucous membranes are moist.      Pharynx: Oropharynx is clear.   Eyes:      Extraocular Movements: Extraocular movements intact.      Pupils: Pupils are equal, round, and reactive to light.   Cardiovascular:      Rate and Rhythm: Normal rate and regular rhythm.      Pulses: Normal pulses.      Heart sounds: Normal heart sounds.   Pulmonary:      Effort: Pulmonary effort is normal. No respiratory distress.      Breath sounds: Normal breath sounds. No wheezing, rhonchi or rales.   Abdominal:      General: Abdomen is flat. Bowel sounds are normal. There is no distension.      Palpations: Abdomen is soft.      Tenderness: There is no abdominal tenderness.   Musculoskeletal:         General: Normal  range of motion.      Cervical back: Normal range of motion and neck supple.   Skin:     General: Skin is warm.      Capillary Refill: Capillary refill takes 2 to 3 seconds.      Comments: Warmth, edema, erythema and tenderness noted to left hand surrounding stitches.. No drainage or fluctuance. Decreased sensation and movement of thumb and pointer finger   Neurological:      General: No focal deficit present.      Mental Status: He is alert and oriented to person, place, and time. Mental status is at baseline.   Psychiatric:         Mood and Affect: Mood normal.         Behavior: Behavior normal.       Significant Labs: All pertinent labs within the past 24 hours have been reviewed.    Significant Imaging: I have reviewed all pertinent imaging results/findings within the past 24 hours.      Assessment/Plan:      * Cellulitis of left upper extremity  Acute:  - s/p cutting hand on a  3/25  -Tetanus UTD  - IV abx  - IVF  -Consult ID and Ortho        Nicotine dependence  Dangers of cigarette smoking were reviewed with patient in detail for 10 minutes and patient was encouraged to quit. Nicotine replacement options were discussed.        Hypothyroidism  Patient has chronic hypothyroidism. TFTs reviewed-   Lab Results   Component Value Date    TSH 2.984 02/23/2021   . Will continue chronic levothyroxine and adjust for and clinical changes.        Chronic back pain  Noted.       Generalized anxiety disorder  Chronic:  - continue home medicaitons      Mixed hyperlipidemia          VTE Risk Mitigation (From admission, onward)         Ordered     enoxaparin injection 40 mg  Daily         03/27/22 2119     IP VTE HIGH RISK PATIENT  Once         03/27/22 2119     Place sequential compression device  Until discontinued         03/27/22 2119                Discharge Planning   CECY:      Code Status: Full Code   Is the patient medically ready for discharge?:     Reason for patient still in hospital (select all that apply):  Patient trending condition and Treatment  Discharge Plan A: Home with family                  Elo Arnold MD  Department of Hospital Medicine   [facility]

## 2022-03-28 NOTE — CONSULTS
"Consult Note  Infectious Disease    Reason for Consult:  Cellulitis of the right hand    HPI: Michael Alonzo Sr. is a 46 y.o. male     3/24/22 from [place] ER notes  46-year-old male presenting today with laceration to the left hand. Patient states that he was working on his truck when he turned to a  on and the  broke causing a laceration to his hand. Patient has had several syncopal episodes since that time because of the site of his own blood. He does present with a very low blood pressure. Patient has had prior trauma to that hand in the past. Has had partial amputations to the first 2 fingers that he had repaired. Currently denies any tingling or numbness. States he is able to move his hand. Denies any other injuries. Denies any chest pain, shortness of breath, nausea, vomiting, diarrhea, fever, or chills. No acute distress noted at this time  Wound exploration: wound explored through full range of motion   Wound extent: fascia violated, foreign bodies/material and muscle damage   Wound extent: no underlying fracture noted and no vascular damage noted   Foreign bodies/material: Grass and black metal fragments"    The wound was closed. He tells me that he was advised that there was tendon injury.   He was given augmentin but  Presented with persistent erythema and complaints that there are still foreign bodies in his hand. He was admitted and placed on clindamycin and he reports improvement, though there may have been some benefit of confinement and elevation of hand. .       Review of patient's allergies indicates:   Allergen Reactions    Amitriptyline Anaphylaxis and Itching    Decadron [dexamethasone sodium phosphate] Anaphylaxis    Paroxetine Anaphylaxis    Tramadol Anaphylaxis    Tomato (solanum lycopersicum) Hives     Past Medical History:   Diagnosis Date    Arthritis     High cholesterol     Stroke     Thyroid disease      Past Surgical History:   Procedure Laterality Date "    OTHER SURGICAL HISTORY      left hand    SPINAL FIXATION SURGERY       Social History     Socioeconomic History    Marital status:    Tobacco Use    Smoking status: Current Every Day Smoker     Packs/day: 0.50     Types: Cigarettes    Smokeless tobacco: Never Used   Substance and Sexual Activity    Alcohol use: No    Drug use: No    Sexual activity: Yes     Partners: Female     Family History   Problem Relation Age of Onset    Stroke Mother     Diabetes Father     Brain cancer Paternal Grandmother     Lung cancer Paternal Grandfather          Review of Systems:   No chills, fever, sweats   No chest pain,  But he did have syncope from the sight of his own blood.   No   shortness of breath,but is an everyday smoker    nausea, vomiting,  Which he attributes to pain and not the augmentin. no focal abd pain,       Other than left hand, No swelling of joints, redness of joints, injuries, or new focal pain  No unusual headaches,    No diabetes  No new rashes, and no recent Staph skin infections    EXAM & DIAGNOSTICS REVIEWED:   Vitals:     Temp:  [96.3 °F (35.7 °C)-98.3 °F (36.8 °C)]   Temp: 97.1 °F (36.2 °C) (03/28/22 1657)  Pulse: (!) 50 (03/28/22 1657)  Resp: 20 (03/28/22 1657)  BP: (!) 144/75 (03/28/22 1657)  SpO2: 98 % (03/28/22 1657)    Intake/Output Summary (Last 24 hours) at 3/28/2022 1710  Last data filed at 3/28/2022 0635  Gross per 24 hour   Intake 708.15 ml   Output --   Net 708.15 ml       General:  In NAD. Alert and attentive, cooperative, comfortable  Eyes:  Anicteric,  , EOMI  ENT:  No ulcers, exudates, thrush, nares patent   Neck:  supple,    Lungs: Clear, no consolidation, rales, wheezes, rub  Heart:  RRR, no gallop/murmur/rub noted  Abd:  Soft, NT, ND, normal BS, no masses or organomegaly appreciated.  :  Voids   Musc:  Excluding left hand, Joints without effusion, swelling, erythema, synovitis, muscle wasting. Cannot dorsiflex index finger. And barely can palmar flex his index  finger. I believe there is voluntary guarding however.   Skin:  No rashes.    Neuro:             Alert, attentive, speech fluent, face symmetric, moves all extremities, no focal weakness. Ambulatory  Psych: Calm, cooperative     Extrem: No edema, erythema, phlebitis, cellulitis, warm and well perfused  VAD:  peripheral     Isolation:    Wound:  3/27    3/28    And at the time of my exam, there is no redness at all.          General Labs reviewed:  Recent Labs   Lab 03/27/22 1934 03/28/22  0512   WBC 6.30 5.39   HGB 12.8* 13.0*   HCT 37.7* 39.3*    230       Recent Labs   Lab 03/27/22 1934 03/28/22  0512    143   K 3.8 3.8    106   CO2 26 27   BUN 10 11   CREATININE 0.8 0.8   CALCIUM 9.2 9.0   PROT 6.8 6.5   BILITOT 0.5 0.3   ALKPHOS 88 96   ALT 28 29   AST 35 30     No results for input(s): CRP in the last 168 hours.      Micro:  Microbiology Results (last 7 days)     ** No results found for the last 168 hours. **          Imaging Reviewed:   Xray left hand    Cardiology:    IMPRESSION & PLAN   1. S/p laceration with tendon injury and inability to dorsiflex his left index finger  2. Cellulitis, which seems to be improved.   3. smoker      Recommendations:  Continue clindamycin  Agree with transfer for hand surgeon    D/w patient, wife and dr. roberson    Medical Decision Making during this encounter was  [_] Low Complexity  [_] Moderate Complexity  [ xx ] High Complexity

## 2022-03-28 NOTE — NURSING
Ortho consult called into Dr. Desai  for hand infection- eval for need for washout. Dr. Desai saw pt and recommended a hand surgeon. Attending notified.

## 2022-03-28 NOTE — PLAN OF CARE
met with patient at bedside to explain the MOON. Patient verbalized his understanding of the MOON and signed.       03/28/22 0934   AREVALO Message   Medicare Outpatient and Observation Notification regarding financial responsibility Explained to patient/caregiver;Signed/date by patient/caregiver   Date AREVALO was signed 03/28/22   Time AREVALO was signed 0934

## 2022-03-28 NOTE — PLAN OF CARE
POC reviewed with patient. PT verbalized understandins. PT on room air. Continued complaints of pain. Hand is edematous. No discharge from wound. Pt educated concerning call light, incentive spirometer and all meds given. PT remains free of falls.Bed is low and locked rails up x2.

## 2022-03-28 NOTE — NURSING
Inpatient consult to Infectious Diseases, Dr. Ngo for worsening hand cellulitis after cutting hand on a  a few days,

## 2022-03-28 NOTE — HPI
Michael Alonzo . Is a 46 y.o. male with PMHx of T2DM, HTN, HLD, and hypothyroidism who presented to ED with increased pain to left hand s/p laceration repair. Patient cut his hand with a  on 3/25 and presented to [facility] where laceration was sutured. He was prescribed Amoxicillin and reports compliance. Tetanus UTD. He reports increased pain and swelling yesterday and noted erythema today. Denies drainage. He describes the pain as a sharp, stabbing sensation. He reports exacerbating factors include dangling arm by side while standing. Denies alleviating factors. Pain radiates up forearm. Pain was 10/10 at its worst and reports improvement with pain medications. He also reports n/v (5-6 episodes) which he attributes 2/2 pain, decreased appetite, intermittent palpitations, and LOC 2/2 pain at time of laceration. He denies CP, SOB, fevers, chills, and diarrhea. ED workup is significant for mild anemia and cellulitis. The patient was placed in observation under the care of Hospital Medicine.

## 2022-03-28 NOTE — PLAN OF CARE
[facility]  Initial Discharge Assessment       Primary Care Provider: Kristal Warren MD    Admission Diagnosis: Cellulitis [L03.90]  Wound infection [T14.8XXA, L08.9]    Admission Date: 3/27/2022  Expected Discharge Date:       met with patient at bedside to complete assessment. Demographics, PCP and insurance verified. No current HH. No dialysis. No DME at home. No hospitalization last 30 days. Case management to assist with any additional needs upon discharge. No further needs to discuss at this time.    Discharge Barriers Identified: None    Payor: [facility] MCARE / Plan: [facility] DUAL COMPLETE HMO SNP / Product Type: Medicare Advantage /     Extended Emergency Contact Information  Primary Emergency Contact: Liz Alonzo  Address: [address] [place] [address] [address]           [place] [address] [facility]  Mobile Phone: [phone]  Relation: Spouse    Discharge Plan A: Home with family  Discharge Plan B: Home      [facility] [address]  Phone: [phone] Fax: [phone]      Initial Assessment (most recent)     Adult Discharge Assessment - 03/28/22 1016        Discharge Assessment    Assessment Type Discharge Planning Assessment     Confirmed/corrected address, phone number and insurance Yes     Confirmed Demographics Correct on Facesheet     Source of Information patient     Does patient/caregiver understand observation status Yes     Lives With spouse     Do you expect to return to your current living situation? Yes     Do you have help at home or someone to help you manage your care at home? Yes     Who are your caregiver(s) and their phone number(s)? Liz Alonzo (Spouse)   [phone] (Mobile)     Prior to hospitilization cognitive status: Alert/Oriented     Current cognitive status: Alert/Oriented     Walking or Climbing Stairs Difficulty none     Dressing/Bathing  Difficulty none     Home Accessibility wheelchair accessible     Equipment Currently Used at Home none     Patient currently being followed by outpatient case management? No     Do you currently have service(s) that help you manage your care at home? No     Do you take prescription medications? Yes     Do you have prescription coverage? Yes     Do you have any problems affording any of your prescribed medications? No     Is the patient taking medications as prescribed? yes     Who is going to help you get home at discharge? Liz Alonzo (Spouse)   [phone] (Mobile)     How do you get to doctors appointments? family or friend will provide     Are you on dialysis? No     Do you take coumadin? No     Discharge Plan A Home with family     Discharge Plan B Home     DME Needed Upon Discharge  none     Discharge Plan discussed with: Patient     Discharge Barriers Identified None        Relationship/Environment    Name(s) of Who Lives With Patient Liz Alonzo (Spouse)   [phone] (Mobile)

## 2022-03-29 ENCOUNTER — PATIENT MESSAGE (OUTPATIENT)
Dept: ADMINISTRATIVE | Facility: HOSPITAL | Age: 47
End: 2022-03-29
Payer: MEDICAID

## 2022-03-29 PROBLEM — E11.9 TYPE 2 DIABETES MELLITUS, WITHOUT LONG-TERM CURRENT USE OF INSULIN: Status: ACTIVE | Noted: 2022-03-29

## 2022-03-29 PROBLEM — R00.1 BRADYCARDIA: Status: ACTIVE | Noted: 2022-03-29

## 2022-03-29 PROBLEM — F41.9 ANXIETY: Status: ACTIVE | Noted: 2022-03-29

## 2022-03-29 PROBLEM — Z72.0 TOBACCO USE: Status: ACTIVE | Noted: 2022-03-29

## 2022-03-29 LAB
ALBUMIN SERPL BCP-MCNC: 3.2 G/DL (ref 3.5–5.2)
ALP SERPL-CCNC: 85 U/L (ref 55–135)
ALT SERPL W/O P-5'-P-CCNC: 27 U/L (ref 10–44)
ANION GAP SERPL CALC-SCNC: 10 MMOL/L (ref 8–16)
AST SERPL-CCNC: 19 U/L (ref 10–40)
BASOPHILS # BLD AUTO: 0.03 K/UL (ref 0–0.2)
BASOPHILS NFR BLD: 0.5 % (ref 0–1.9)
BILIRUB SERPL-MCNC: 0.2 MG/DL (ref 0.1–1)
BUN SERPL-MCNC: 8 MG/DL (ref 6–20)
CALCIUM SERPL-MCNC: 8.8 MG/DL (ref 8.7–10.5)
CHLORIDE SERPL-SCNC: 107 MMOL/L (ref 95–110)
CO2 SERPL-SCNC: 25 MMOL/L (ref 23–29)
CREAT SERPL-MCNC: 0.7 MG/DL (ref 0.5–1.4)
DIFFERENTIAL METHOD: ABNORMAL
EOSINOPHIL # BLD AUTO: 0.2 K/UL (ref 0–0.5)
EOSINOPHIL NFR BLD: 3.6 % (ref 0–8)
ERYTHROCYTE [DISTWIDTH] IN BLOOD BY AUTOMATED COUNT: 12.3 % (ref 11.5–14.5)
EST. GFR  (AFRICAN AMERICAN): >60 ML/MIN/1.73 M^2
EST. GFR  (NON AFRICAN AMERICAN): >60 ML/MIN/1.73 M^2
GLUCOSE SERPL-MCNC: 111 MG/DL (ref 70–110)
HCT VFR BLD AUTO: 38.4 % (ref 40–54)
HGB BLD-MCNC: 12.9 G/DL (ref 14–18)
IMM GRANULOCYTES # BLD AUTO: 0.02 K/UL (ref 0–0.04)
IMM GRANULOCYTES NFR BLD AUTO: 0.3 % (ref 0–0.5)
LYMPHOCYTES # BLD AUTO: 2.5 K/UL (ref 1–4.8)
LYMPHOCYTES NFR BLD: 42.2 % (ref 18–48)
MAGNESIUM SERPL-MCNC: 2 MG/DL (ref 1.6–2.6)
MCH RBC QN AUTO: 29.6 PG (ref 27–31)
MCHC RBC AUTO-ENTMCNC: 33.6 G/DL (ref 32–36)
MCV RBC AUTO: 88 FL (ref 82–98)
MONOCYTES # BLD AUTO: 0.6 K/UL (ref 0.3–1)
MONOCYTES NFR BLD: 10.7 % (ref 4–15)
NEUTROPHILS # BLD AUTO: 2.5 K/UL (ref 1.8–7.7)
NEUTROPHILS NFR BLD: 42.7 % (ref 38–73)
NRBC BLD-RTO: 0 /100 WBC
PHOSPHATE SERPL-MCNC: 4.2 MG/DL (ref 2.7–4.5)
PLATELET # BLD AUTO: 215 K/UL (ref 150–450)
PMV BLD AUTO: 10.3 FL (ref 9.2–12.9)
POCT GLUCOSE: 120 MG/DL (ref 70–110)
POCT GLUCOSE: 125 MG/DL (ref 70–110)
POCT GLUCOSE: 131 MG/DL (ref 70–110)
POCT GLUCOSE: 132 MG/DL (ref 70–110)
POCT GLUCOSE: 154 MG/DL (ref 70–110)
POTASSIUM SERPL-SCNC: 4.1 MMOL/L (ref 3.5–5.1)
PROT SERPL-MCNC: 6.1 G/DL (ref 6–8.4)
RBC # BLD AUTO: 4.36 M/UL (ref 4.6–6.2)
SODIUM SERPL-SCNC: 142 MMOL/L (ref 136–145)
WBC # BLD AUTO: 5.9 K/UL (ref 3.9–12.7)

## 2022-03-29 PROCEDURE — 25000003 PHARM REV CODE 250: Performed by: HOSPITALIST

## 2022-03-29 PROCEDURE — 63600175 PHARM REV CODE 636 W HCPCS: Performed by: NURSE PRACTITIONER

## 2022-03-29 PROCEDURE — 21400001 HC TELEMETRY ROOM

## 2022-03-29 PROCEDURE — 85025 COMPLETE CBC W/AUTO DIFF WBC: CPT | Performed by: HOSPITALIST

## 2022-03-29 PROCEDURE — 25000003 PHARM REV CODE 250: Performed by: NURSE PRACTITIONER

## 2022-03-29 PROCEDURE — 99233 SBSQ HOSP IP/OBS HIGH 50: CPT | Mod: ,,, | Performed by: INTERNAL MEDICINE

## 2022-03-29 PROCEDURE — S4991 NICOTINE PATCH NONLEGEND: HCPCS | Performed by: HOSPITALIST

## 2022-03-29 PROCEDURE — 99233 PR SUBSEQUENT HOSPITAL CARE,LEVL III: ICD-10-PCS | Mod: ,,, | Performed by: INTERNAL MEDICINE

## 2022-03-29 PROCEDURE — 84100 ASSAY OF PHOSPHORUS: CPT | Performed by: HOSPITALIST

## 2022-03-29 PROCEDURE — 83735 ASSAY OF MAGNESIUM: CPT | Performed by: HOSPITALIST

## 2022-03-29 PROCEDURE — 80053 COMPREHEN METABOLIC PANEL: CPT | Performed by: HOSPITALIST

## 2022-03-29 PROCEDURE — 36415 COLL VENOUS BLD VENIPUNCTURE: CPT | Performed by: HOSPITALIST

## 2022-03-29 PROCEDURE — 94761 N-INVAS EAR/PLS OXIMETRY MLT: CPT

## 2022-03-29 RX ORDER — HYDROMORPHONE HYDROCHLORIDE 1 MG/ML
0.5 INJECTION, SOLUTION INTRAMUSCULAR; INTRAVENOUS; SUBCUTANEOUS EVERY 4 HOURS PRN
Status: DISCONTINUED | OUTPATIENT
Start: 2022-03-29 | End: 2022-03-30 | Stop reason: HOSPADM

## 2022-03-29 RX ORDER — DULOXETIN HYDROCHLORIDE 30 MG/1
30 CAPSULE, DELAYED RELEASE ORAL NIGHTLY
Status: DISCONTINUED | OUTPATIENT
Start: 2022-03-30 | End: 2022-03-30 | Stop reason: HOSPADM

## 2022-03-29 RX ADMIN — HYDROCODONE BITARTRATE AND ACETAMINOPHEN 1 TABLET: 10; 325 TABLET ORAL at 04:03

## 2022-03-29 RX ADMIN — HYDROMORPHONE HYDROCHLORIDE 0.5 MG: 1 INJECTION, SOLUTION INTRAMUSCULAR; INTRAVENOUS; SUBCUTANEOUS at 11:03

## 2022-03-29 RX ADMIN — GABAPENTIN 600 MG: 300 CAPSULE ORAL at 08:03

## 2022-03-29 RX ADMIN — CLINDAMYCIN IN 5 PERCENT DEXTROSE 900 MG: 18 INJECTION, SOLUTION INTRAVENOUS at 11:03

## 2022-03-29 RX ADMIN — HYDROCODONE BITARTRATE AND ACETAMINOPHEN 1 TABLET: 10; 325 TABLET ORAL at 08:03

## 2022-03-29 RX ADMIN — BUSPIRONE HYDROCHLORIDE 15 MG: 7.5 TABLET ORAL at 08:03

## 2022-03-29 RX ADMIN — HYDROMORPHONE HYDROCHLORIDE 0.5 MG: 1 INJECTION, SOLUTION INTRAMUSCULAR; INTRAVENOUS; SUBCUTANEOUS at 12:03

## 2022-03-29 RX ADMIN — GABAPENTIN 600 MG: 300 CAPSULE ORAL at 04:03

## 2022-03-29 RX ADMIN — HYDROMORPHONE HYDROCHLORIDE 0.5 MG: 1 INJECTION, SOLUTION INTRAMUSCULAR; INTRAVENOUS; SUBCUTANEOUS at 05:03

## 2022-03-29 RX ADMIN — CLINDAMYCIN IN 5 PERCENT DEXTROSE 900 MG: 18 INJECTION, SOLUTION INTRAVENOUS at 08:03

## 2022-03-29 RX ADMIN — HYDROMORPHONE HYDROCHLORIDE 0.5 MG: 1 INJECTION, SOLUTION INTRAMUSCULAR; INTRAVENOUS; SUBCUTANEOUS at 08:03

## 2022-03-29 RX ADMIN — HYDROCODONE BITARTRATE AND ACETAMINOPHEN 1 TABLET: 10; 325 TABLET ORAL at 01:03

## 2022-03-29 RX ADMIN — CLINDAMYCIN IN 5 PERCENT DEXTROSE 900 MG: 18 INJECTION, SOLUTION INTRAVENOUS at 04:03

## 2022-03-29 RX ADMIN — Medication 1 PATCH: at 09:03

## 2022-03-29 NOTE — NURSING
Report was called to Violeta BLOOM at [facility]. Pt will be going to room 350. Transportation was set up, wife at bedside, pt is alert and oriented, VS were taken prior to discharge.

## 2022-03-29 NOTE — DISCHARGE SUMMARY
[facility] Hospital Medicine  Discharge Summary      Patient Name: Michael Alonzo Sr.  MRN: [id number]  Patient Class: OP- Observation  Admission Date: 3/27/2022  Hospital Length of Stay: 0 days  Discharge Date and Time: 3/28/2022  9:15 PM  Attending Physician: No att. providers found   Discharging Provider: Elo Arnold MD  Primary Care Provider: Kristal Warren MD      HPI:   Michael Alonzo Sr. Is a 46 y.o. male with PMHx of T2DM, HTN, HLD, and hypothyroidism who presented to ED with increased pain to left hand s/p laceration repair. Patient cut his hand with a  on 3/25 and presented to [facility] where laceration was sutured. He was prescribed Amoxicillin and reports compliance. Tetanus UTD. He reports increased pain and swelling yesterday and noted erythema today. Denies drainage. He describes the pain as a sharp, stabbing sensation. He reports exacerbating factors include dangling arm by side while standing. Denies alleviating factors. Pain radiates up forearm. Pain was 10/10 at its worst and reports improvement with pain medications. He also reports n/v (5-6 episodes) which he attributes 2/2 pain, decreased appetite, intermittent palpitations, and LOC 2/2 pain at time of laceration. He denies CP, SOB, fevers, chills, and diarrhea. ED workup is significant for mild anemia and cellulitis. The patient was placed in observation under the care of Hospital Medicine.             * No surgery found *      Hospital Course:   Patient was observed by the hospital medicine for hand cellulitis after injury not responding to PO antibiotics. He was placed on IV antibiocs. ID and ortho consulted. Ortho recommended transfer for hand surgeon to consult. Patient's redness/erythema improved on IV antibiotics. He was transferred to OSH for evaluation by hand surgeon.       Goals of Care Treatment Preferences:  Code Status: Full Code      Consults:   Consults (From admission, onward)         Status Ordering Provider     Inpatient consult to Infectious Diseases  Once        Provider:  Edith Ngo MD    Completed JITENDRA RAND          No new Assessment & Plan notes have been filed under this hospital service since the last note was generated.  Service: Hospital Medicine    Final Active Diagnoses:    Diagnosis Date Noted POA    PRINCIPAL PROBLEM:  Cellulitis of left upper extremity [L03.114] 03/27/2022 Yes    Chronic back pain [M54.9, G89.29] 06/15/2018 Yes    Generalized anxiety disorder [F41.1] 07/31/2017 Yes    Hypothyroidism [E03.9] 07/20/2017 Yes    Nicotine dependence [F17.200] 07/20/2017 Yes    Mixed hyperlipidemia [E78.2] 07/14/2017 Yes      Problems Resolved During this Admission:       Discharged Condition: good    Disposition: Short Term Hospital    Follow Up:    Patient Instructions:   No discharge procedures on file.    Significant Diagnostic Studies: Labs:   BMP:   Recent Labs   Lab 03/27/22  1934 03/28/22  0512     163*     143    K 3.8 3.8     106    CO2 26 27    BUN 10 11    CREATININE 0.8 0.8    CALCIUM 9.2 9.0    MG  --  2.4     and CBC   Recent Labs   Lab 03/27/22 1934 03/28/22  0512    WBC 6.30 5.39    HGB 12.8* 13.0*    HCT 37.7* 39.3*     230    Radiology Results (last 7 days)    Procedure Component Value Units Date/Time   X-Ray Hand 3 View Left [[id number]] Resulted: 03/28/22 0803   Order Status: Completed Updated: 03/28/22 0806   Narrative:     EXAMINATION:   XR HAND COMPLETE 3 VIEW LEFT     CLINICAL HISTORY:   cellulitis;.     TECHNIQUE:   PA, lateral, and oblique views of the left hand were performed.     COMPARISON:   Left hand x-ray of January 9, 2016     FINDINGS:   There is a 7.5 mm linear metallic foreign body in the soft tissues on the palmar side of the proximal end of the proximal phalanx of the left ring finger unchanged since 2016.  No fractures or other osseous abnormalities are identified at this time.   Juxta-articular bone erosion or Osteoporosis is not seen.  There is mild soft tissue swelling of the dorsum of the hand.  Subluxation at the wrist is not seen.    Impression:       Metallic foreign body identified in the palmar surface of the left hand unchanged since at least 2016 othe.  Soft tissue swelling of the dorsum.       Electronically signed by: Garfield Harvey MD   Date: 03/28/2022   Time: 08:03         Pending Diagnostic Studies:     None         Medications:  Reconciled Home Medications:      Medication List      ASK your doctor about these medications    albuterol 90 mcg/actuation inhaler  Commonly known as: PROAIR HFA  Inhale 2 puffs into the lungs every 6 (six) hours as needed for Wheezing. Rescue     aspirin 81 MG EC tablet  Commonly known as: ECOTRIN  Take 1 tablet by mouth once daily.     busPIRone 15 MG tablet  Commonly known as: BUSPAR  Take 1 tablet (15 mg total) by mouth 2 (two) times daily.     celecoxib 200 MG capsule  Commonly known as: CeleBREX  TAKE ONE CAPSULE BY MOUTH ONCE DAILY     diclofenac sodium 1 % Gel  Commonly known as: VOLTAREN  Apply topically 4 (four) times daily.     diphenhydrAMINE 25 mg tablet  Commonly known as: SOMINEX  Take 25 mg by mouth nightly as needed for Insomnia.     diphenhydrAMINE-acetaminophen  mg Tab  Commonly known as: TYLENOL PM  Tylenol PM   2 HS     docusate sodium 100 MG capsule  Commonly known as: COLACE  Take 1 capsule by mouth daily as needed.     DULoxetine 30 MG capsule  Commonly known as: CYMBALTA  TAKE ONE CAPSULE BY MOUTH ONCE DAILY     fluticasone propionate 50 mcg/actuation nasal spray  Commonly known as: FLONASE  1 spray by Each Nostril route daily as needed.     gabapentin 600 MG tablet  Commonly known as: NEURONTIN  TAKE ONE TABLET BY MOUTH THREE TIMES DAILY     levothyroxine 25 MCG tablet  Commonly known as: SYNTHROID  TAKE ONE TABLET BY MOUTH ONCE DAILY     metFORMIN 1000 MG tablet  Commonly known as: GLUCOPHAGE  TAKE ONE TABLET BY  MOUTH ONCE DAILY     methocarbamoL 500 MG Tab  Commonly known as: ROBAXIN  Take 500 mg by mouth 3 (three) times daily.     mupirocin 2 % ointment  Commonly known as: BACTROBAN  Apply topically 3 (three) times daily.     POLYETHYLENE GLYCOL 3350 ORAL  1 Dose by NOT APPLICABLE route.     rosuvastatin 20 MG tablet  Commonly known as: CRESTOR  TAKE ONE TABLET BY MOUTH ONCE DAILY     traZODone 50 MG tablet  Commonly known as: DESYREL  TAKE ONE TABLET BY MOUTH NIGHTLY AS NEEDED FOR INSOMNIA            Indwelling Lines/Drains at time of discharge:   Lines/Drains/Airways     None                 Time spent on the discharge of patient: 45 minutes     Discussed with ID, ortho, as well as Hospital Medicine and hand surgeon at [facility].     Elo Arnold MD  Department of Hospital Medicine  [facility]

## 2022-03-29 NOTE — NURSING
Pt discharged via EMS stretcher, no acute distress, HS meds given.. IV heplock patent and secure.  Tele box 0744 removed from patient.To be admitted to [facility] rm 350.

## 2022-03-29 NOTE — ASSESSMENT & PLAN NOTE
On metformin at home as needed  Lab Results   Component Value Date    HGBA1C 5.7% 02/23/2021     Iss, diabetic diet  Sugars ok

## 2022-03-29 NOTE — SUBJECTIVE & OBJECTIVE
Past Medical History:   Diagnosis Date    Arthritis     High cholesterol     Stroke     Thyroid disease        Past Surgical History:   Procedure Laterality Date    OTHER SURGICAL HISTORY      left hand    SPINAL FIXATION SURGERY         Review of patient's allergies indicates:   Allergen Reactions    Amitriptyline Anaphylaxis and Itching    Decadron [dexamethasone sodium phosphate] Anaphylaxis    Paroxetine Anaphylaxis    Tramadol Anaphylaxis    Tomato (solanum lycopersicum) Hives       Current Facility-Administered Medications on File Prior to Encounter   Medication    [DISCONTINUED] 0.9%  NaCl infusion    [DISCONTINUED] acetaminophen tablet 650 mg    [DISCONTINUED] acetaminophen tablet 650 mg    [DISCONTINUED] albuterol-ipratropium 2.5 mg-0.5 mg/3 mL nebulizer solution 3 mL    [DISCONTINUED] aspirin EC tablet 81 mg    [DISCONTINUED] atorvastatin tablet 80 mg    [DISCONTINUED] busPIRone tablet 15 mg    [DISCONTINUED] clindamycin in D5W 900 mg/50 mL IVPB 900 mg    [DISCONTINUED] dextrose 50% injection 12.5 g    [DISCONTINUED] dextrose 50% injection 25 g    [DISCONTINUED] docusate sodium capsule 100 mg    [DISCONTINUED] DULoxetine DR capsule 30 mg    [DISCONTINUED] enoxaparin injection 40 mg    [DISCONTINUED] gabapentin capsule 600 mg    [DISCONTINUED] glucagon (human recombinant) injection 1 mg    [DISCONTINUED] glucose chewable tablet 16 g    [DISCONTINUED] glucose chewable tablet 24 g    [DISCONTINUED] HYDROcodone-acetaminophen  mg per tablet 1 tablet    [DISCONTINUED] insulin aspart U-100 pen 0-5 Units    [DISCONTINUED] levothyroxine tablet 25 mcg    [DISCONTINUED] magnesium oxide tablet 800 mg    [DISCONTINUED] magnesium oxide tablet 800 mg    [DISCONTINUED] melatonin tablet 9 mg    [DISCONTINUED] morphine injection 4 mg    [DISCONTINUED] naloxone 0.4 mg/mL injection 0.02 mg    [DISCONTINUED] nicotine 21 mg/24 hr 1 patch    [DISCONTINUED] ondansetron injection 4 mg    [DISCONTINUED] potassium bicarbonate  disintegrating tablet 35 mEq    [DISCONTINUED] potassium bicarbonate disintegrating tablet 50 mEq    [DISCONTINUED] potassium bicarbonate disintegrating tablet 60 mEq    [DISCONTINUED] senna-docusate 8.6-50 mg per tablet 1 tablet    [DISCONTINUED] sodium chloride 0.9% flush 10 mL    [DISCONTINUED] trazodone split tablet 50 mg     Current Outpatient Medications on File Prior to Encounter   Medication Sig    albuterol (PROAIR HFA) 90 mcg/actuation inhaler Inhale 2 puffs into the lungs every 6 (six) hours as needed for Wheezing. Rescue    aspirin (ECOTRIN) 81 MG EC tablet Take 1 tablet by mouth once daily.    busPIRone (BUSPAR) 15 MG tablet Take 1 tablet (15 mg total) by mouth 2 (two) times daily.    celecoxib (CELEBREX) 200 MG capsule TAKE ONE CAPSULE BY MOUTH ONCE DAILY    diclofenac sodium (VOLTAREN) 1 % Gel Apply topically 4 (four) times daily.    diphenhydrAMINE (SOMINEX) 25 mg tablet Take 25 mg by mouth nightly as needed for Insomnia.    diphenhydrAMINE-acetaminophen (TYLENOL PM)  mg Tab Tylenol PM   2 HS    docusate sodium (COLACE) 100 MG capsule Take 1 capsule by mouth daily as needed.    DULoxetine (CYMBALTA) 30 MG capsule TAKE ONE CAPSULE BY MOUTH ONCE DAILY    fluticasone propionate (FLONASE) 50 mcg/actuation nasal spray 1 spray by Each Nostril route daily as needed.    gabapentin (NEURONTIN) 600 MG tablet TAKE ONE TABLET BY MOUTH THREE TIMES DAILY    levothyroxine (SYNTHROID) 25 MCG tablet TAKE ONE TABLET BY MOUTH ONCE DAILY    metFORMIN (GLUCOPHAGE) 1000 MG tablet TAKE ONE TABLET BY MOUTH ONCE DAILY    methocarbamoL (ROBAXIN) 500 MG Tab Take 500 mg by mouth 3 (three) times daily.    mupirocin (BACTROBAN) 2 % ointment Apply topically 3 (three) times daily.    POLYETHYLENE GLYCOL 3350 ORAL 1 Dose by NOT APPLICABLE route.    rosuvastatin (CRESTOR) 20 MG tablet TAKE ONE TABLET BY MOUTH ONCE DAILY    traZODone (DESYREL) 50 MG tablet TAKE ONE TABLET BY MOUTH NIGHTLY AS NEEDED FOR INSOMNIA (Patient taking  differently: Pt takes half)     Family History       Problem Relation (Age of Onset)    Brain cancer Paternal Grandmother    Diabetes Father    Lung cancer Paternal Grandfather    Stroke Mother          Tobacco Use    Smoking status: Current Every Day Smoker     Packs/day: 0.50     Types: Cigarettes    Smokeless tobacco: Never Used   Substance and Sexual Activity    Alcohol use: No    Drug use: No    Sexual activity: Yes     Partners: Female     Review of Systems   Constitutional:  Negative for activity change, appetite change, fatigue and fever.   HENT:  Negative for congestion, ear pain and postnasal drip.    Eyes:  Negative for discharge.   Respiratory:  Negative for apnea, shortness of breath and wheezing.    Cardiovascular:  Negative for chest pain and leg swelling.   Gastrointestinal:  Negative for abdominal distention, abdominal pain, nausea and vomiting.   Endocrine: Negative for polydipsia, polyphagia and polyuria.   Genitourinary:  Negative for difficulty urinating, flank pain, frequency, hematuria and urgency.   Musculoskeletal:  Positive for arthralgias and joint swelling.   Skin:  Positive for wound. Negative for pallor and rash.   Allergic/Immunologic: Negative for environmental allergies and food allergies.   Neurological:  Negative for dizziness, speech difficulty, weakness, light-headedness and headaches.   Hematological:  Does not bruise/bleed easily.   Psychiatric/Behavioral:  Negative for agitation.    Objective:     Vital Signs (Most Recent):  Temp: 98.2 °F (36.8 °C) (03/28/22 2205)  Pulse: (!) 55 (03/28/22 2205)  Resp: 20 (03/28/22 2254)  BP: (!) 168/81 (03/28/22 2205)  SpO2: 99 % (03/28/22 2205)   Vital Signs (24h Range):  Temp:  [96.3 °F (35.7 °C)-98.2 °F (36.8 °C)] 98.2 °F (36.8 °C)  Pulse:  [50-82] 55  Resp:  [16-20] 20  SpO2:  [95 %-99 %] 99 %  BP: (123-168)/(58-91) 168/81     Weight: 111.2 kg (245 lb 2.8 oz)  Body mass index is 30.64 kg/m².    Physical Exam  Constitutional:        Appearance: Normal appearance. He is well-developed.   HENT:      Head: Normocephalic.   Eyes:      Conjunctiva/sclera: Conjunctivae normal.   Cardiovascular:      Rate and Rhythm: Regular rhythm. Bradycardia present.      Heart sounds: Normal heart sounds.   Pulmonary:      Effort: Pulmonary effort is normal.      Breath sounds: Normal breath sounds.   Abdominal:      General: Bowel sounds are normal. There is no distension.      Palpations: Abdomen is soft.      Tenderness: There is no abdominal tenderness.   Musculoskeletal:         General: Normal range of motion.      Cervical back: Normal range of motion and neck supple.   Skin:     General: Skin is warm and dry.   Neurological:      Mental Status: He is alert and oriented to person, place, and time.      GCS: GCS eye subscore is 4. GCS verbal subscore is 5. GCS motor subscore is 6.      Motor: Motor function is intact.   Psychiatric:         Mood and Affect: Mood normal.         Speech: Speech normal.         Behavior: Behavior normal.           Significant Labs: All pertinent labs within the past 24 hours have been reviewed.  CBC:   Recent Labs   Lab 03/27/22  1934 03/28/22  0512   WBC 6.30 5.39   HGB 12.8* 13.0*   HCT 37.7* 39.3*    230     CMP:   Recent Labs   Lab 03/27/22  1934 03/28/22  0512    143   K 3.8 3.8    106   CO2 26 27    163*   BUN 10 11   CREATININE 0.8 0.8   CALCIUM 9.2 9.0   PROT 6.8 6.5   ALBUMIN 3.5 3.3*   BILITOT 0.5 0.3   ALKPHOS 88 96   AST 35 30   ALT 28 29   ANIONGAP 9 10   EGFRNONAA >60 >60       Significant Imaging: I have reviewed all pertinent imaging results/findings within the past 24 hours.

## 2022-03-29 NOTE — NURSING
Updated reported given to Violeta BLOOM at [facility] on pt at time of discharge. All hs medication was given prior to discharge and AC/HS accucheck was taken and no coverage was needed. Wife was at bedside at time of discharge, pt alert and oriented was able to ambulate to stretcher. Pt took all belongings to car before pt was discharged.

## 2022-03-29 NOTE — SUBJECTIVE & OBJECTIVE
Interval History: swelling and redness of hand better,cant move the second finger.    Review of Systems   Constitutional:  Negative for chills and fever.   HENT:  Negative for trouble swallowing.    Respiratory:  Negative for cough and shortness of breath.    Cardiovascular:  Negative for chest pain and leg swelling.   Gastrointestinal:  Negative for abdominal pain, blood in stool, nausea and vomiting.   Genitourinary:  Negative for dysuria and hematuria.   Musculoskeletal:  Positive for arthralgias. Negative for gait problem.   Skin:  Positive for wound.   Neurological:  Negative for dizziness, syncope, light-headedness and headaches.   Psychiatric/Behavioral:  Negative for confusion.    Objective:     Vital Signs (Most Recent):  Temp: 98.2 °F (36.8 °C) (03/29/22 1130)  Pulse: (!) 51 (03/29/22 1200)  Resp: 20 (03/29/22 1352)  BP: (!) 141/72 (03/29/22 1130)  SpO2: 98 % (03/29/22 1130)   Vital Signs (24h Range):  Temp:  [97.1 °F (36.2 °C)-98.8 °F (37.1 °C)] 98.2 °F (36.8 °C)  Pulse:  [39-68] 51  Resp:  [16-20] 20  SpO2:  [97 %-99 %] 98 %  BP: (124-168)/(59-91) 141/72     Weight: 111.2 kg (245 lb 2.8 oz)  Body mass index is 30.64 kg/m².    Intake/Output Summary (Last 24 hours) at 3/29/2022 1426  Last data filed at 3/29/2022 0630  Gross per 24 hour   Intake 289.02 ml   Output --   Net 289.02 ml      Physical Exam  Vitals reviewed.   Constitutional:       General: He is not in acute distress.     Appearance: He is well-developed.   HENT:      Head: Normocephalic and atraumatic.   Eyes:      Extraocular Movements: Extraocular movements intact.      Pupils: Pupils are equal, round, and reactive to light.   Cardiovascular:      Rate and Rhythm: Normal rate and regular rhythm.   Pulmonary:      Effort: Pulmonary effort is normal. No respiratory distress.      Breath sounds: Normal breath sounds.   Abdominal:      General: Bowel sounds are normal. There is no distension.      Palpations: Abdomen is soft.      Tenderness:  There is no abdominal tenderness.   Musculoskeletal:         General: Normal range of motion.      Cervical back: Normal range of motion and neck supple.      Comments: Left hand suture line seen, improved swelling and redness compared to pictures, chronic decreased rom of left thumb from prior injury, decreased rom of index finger.   Skin:     General: Skin is warm.      Findings: No rash.   Neurological:      Mental Status: He is alert and oriented to person, place, and time.   Psychiatric:         Mood and Affect: Mood normal.         Behavior: Behavior normal.       Significant Labs: All pertinent labs within the past 24 hours have been reviewed.    Significant Imaging: I have reviewed all pertinent imaging results/findings within the past 24 hours.

## 2022-03-29 NOTE — HOSPITAL COURSE
Patient was observed by the hospital medicine for hand cellulitis after injury not responding to PO antibiotics. He was placed on IV antibiocs. ID and ortho consulted. Ortho recommended transfer for hand surgeon to consult. Patient's redness/erythema improved on IV antibiotics. He was transferred to OSH for evaluation by hand surgeon.

## 2022-03-29 NOTE — H&P
[facility] Surg [facility] Medicine  History & Physical    Patient Name: Michael Alonzo Sr.  MRN: [id number]  Patient Class: IP- Inpatient  Admission Date: 3/28/2022  Attending Physician: Katherine Reynoso MD   Primary Care Provider: Kristal Warren MD         Patient information was obtained from patient, past medical records and ER records.     Subjective:     Principal Problem:Cellulitis of hand, left    Chief Complaint: No chief complaint on file.       HPI: The patient is a 45yo man with a past medical history of DM2, HTN, HLD and hypothyroidism.  He also has a history of remote left finger amputations with residual metal in place (cannot get MRI).  He recently sustained a left hand injury this past Friday, 3/25/22, after cutting his hand on a .  He went to [place] and had stiches placed at that time, then discharged on Augmentin after local washout.  He returned to the ED at [facility] on late yesterday due to worsening cellulitis and local swelling and inflammation about the suture area.  He was placed on Clindamycin, and ID was consulted as well.  He is also developing new increased pain and decreased range of motion to the left hand, especially the thumb.  The patient was transferred to [facility] for further management of his hand cellulitis and Hand Surgery consult.         Past Medical History:   Diagnosis Date    Arthritis     High cholesterol     Stroke     Thyroid disease        Past Surgical History:   Procedure Laterality Date    OTHER SURGICAL HISTORY      left hand    SPINAL FIXATION SURGERY         Review of patient's allergies indicates:   Allergen Reactions    Amitriptyline Anaphylaxis and Itching    Decadron [dexamethasone sodium phosphate] Anaphylaxis    Paroxetine Anaphylaxis    Tramadol Anaphylaxis    Tomato (solanum lycopersicum) Hives       Current Facility-Administered Medications on File Prior to Encounter   Medication    [DISCONTINUED] 0.9%  NaCl  infusion    [DISCONTINUED] acetaminophen tablet 650 mg    [DISCONTINUED] acetaminophen tablet 650 mg    [DISCONTINUED] albuterol-ipratropium 2.5 mg-0.5 mg/3 mL nebulizer solution 3 mL    [DISCONTINUED] aspirin EC tablet 81 mg    [DISCONTINUED] atorvastatin tablet 80 mg    [DISCONTINUED] busPIRone tablet 15 mg    [DISCONTINUED] clindamycin in D5W 900 mg/50 mL IVPB 900 mg    [DISCONTINUED] dextrose 50% injection 12.5 g    [DISCONTINUED] dextrose 50% injection 25 g    [DISCONTINUED] docusate sodium capsule 100 mg    [DISCONTINUED] DULoxetine DR capsule 30 mg    [DISCONTINUED] enoxaparin injection 40 mg    [DISCONTINUED] gabapentin capsule 600 mg    [DISCONTINUED] glucagon (human recombinant) injection 1 mg    [DISCONTINUED] glucose chewable tablet 16 g    [DISCONTINUED] glucose chewable tablet 24 g    [DISCONTINUED] HYDROcodone-acetaminophen  mg per tablet 1 tablet    [DISCONTINUED] insulin aspart U-100 pen 0-5 Units    [DISCONTINUED] levothyroxine tablet 25 mcg    [DISCONTINUED] magnesium oxide tablet 800 mg    [DISCONTINUED] magnesium oxide tablet 800 mg    [DISCONTINUED] melatonin tablet 9 mg    [DISCONTINUED] morphine injection 4 mg    [DISCONTINUED] naloxone 0.4 mg/mL injection 0.02 mg    [DISCONTINUED] nicotine 21 mg/24 hr 1 patch    [DISCONTINUED] ondansetron injection 4 mg    [DISCONTINUED] potassium bicarbonate disintegrating tablet 35 mEq    [DISCONTINUED] potassium bicarbonate disintegrating tablet 50 mEq    [DISCONTINUED] potassium bicarbonate disintegrating tablet 60 mEq    [DISCONTINUED] senna-docusate 8.6-50 mg per tablet 1 tablet    [DISCONTINUED] sodium chloride 0.9% flush 10 mL    [DISCONTINUED] trazodone split tablet 50 mg     Current Outpatient Medications on File Prior to Encounter   Medication Sig    albuterol (PROAIR HFA) 90 mcg/actuation inhaler Inhale 2 puffs into the lungs every 6 (six) hours as needed for Wheezing. Rescue    aspirin (ECOTRIN) 81 MG EC  tablet Take 1 tablet by mouth once daily.    busPIRone (BUSPAR) 15 MG tablet Take 1 tablet (15 mg total) by mouth 2 (two) times daily.    celecoxib (CELEBREX) 200 MG capsule TAKE ONE CAPSULE BY MOUTH ONCE DAILY    diclofenac sodium (VOLTAREN) 1 % Gel Apply topically 4 (four) times daily.    diphenhydrAMINE (SOMINEX) 25 mg tablet Take 25 mg by mouth nightly as needed for Insomnia.    diphenhydrAMINE-acetaminophen (TYLENOL PM)  mg Tab Tylenol PM   2 HS    docusate sodium (COLACE) 100 MG capsule Take 1 capsule by mouth daily as needed.    DULoxetine (CYMBALTA) 30 MG capsule TAKE ONE CAPSULE BY MOUTH ONCE DAILY    fluticasone propionate (FLONASE) 50 mcg/actuation nasal spray 1 spray by Each Nostril route daily as needed.    gabapentin (NEURONTIN) 600 MG tablet TAKE ONE TABLET BY MOUTH THREE TIMES DAILY    levothyroxine (SYNTHROID) 25 MCG tablet TAKE ONE TABLET BY MOUTH ONCE DAILY    metFORMIN (GLUCOPHAGE) 1000 MG tablet TAKE ONE TABLET BY MOUTH ONCE DAILY    methocarbamoL (ROBAXIN) 500 MG Tab Take 500 mg by mouth 3 (three) times daily.    mupirocin (BACTROBAN) 2 % ointment Apply topically 3 (three) times daily.    POLYETHYLENE GLYCOL 3350 ORAL 1 Dose by NOT APPLICABLE route.    rosuvastatin (CRESTOR) 20 MG tablet TAKE ONE TABLET BY MOUTH ONCE DAILY    traZODone (DESYREL) 50 MG tablet TAKE ONE TABLET BY MOUTH NIGHTLY AS NEEDED FOR INSOMNIA (Patient taking differently: Pt takes half)     Family History       Problem Relation (Age of Onset)    Brain cancer Paternal Grandmother    Diabetes Father    Lung cancer Paternal Grandfather    Stroke Mother          Tobacco Use    Smoking status: Current Every Day Smoker     Packs/day: 0.50     Types: Cigarettes    Smokeless tobacco: Never Used   Substance and Sexual Activity    Alcohol use: No    Drug use: No    Sexual activity: Yes     Partners: Female     Review of Systems   Constitutional:  Negative for activity change, appetite change, fatigue and  fever.   HENT:  Negative for congestion, ear pain and postnasal drip.    Eyes:  Negative for discharge.   Respiratory:  Negative for apnea, shortness of breath and wheezing.    Cardiovascular:  Negative for chest pain and leg swelling.   Gastrointestinal:  Negative for abdominal distention, abdominal pain, nausea and vomiting.   Endocrine: Negative for polydipsia, polyphagia and polyuria.   Genitourinary:  Negative for difficulty urinating, flank pain, frequency, hematuria and urgency.   Musculoskeletal:  Positive for arthralgias and joint swelling.   Skin:  Positive for wound. Negative for pallor and rash.   Allergic/Immunologic: Negative for environmental allergies and food allergies.   Neurological:  Negative for dizziness, speech difficulty, weakness, light-headedness and headaches.   Hematological:  Does not bruise/bleed easily.   Psychiatric/Behavioral:  Negative for agitation.    Objective:     Vital Signs (Most Recent):  Temp: 98.2 °F (36.8 °C) (03/28/22 2205)  Pulse: (!) 55 (03/28/22 2205)  Resp: 20 (03/28/22 2254)  BP: (!) 168/81 (03/28/22 2205)  SpO2: 99 % (03/28/22 2205)   Vital Signs (24h Range):  Temp:  [96.3 °F (35.7 °C)-98.2 °F (36.8 °C)] 98.2 °F (36.8 °C)  Pulse:  [50-82] 55  Resp:  [16-20] 20  SpO2:  [95 %-99 %] 99 %  BP: (123-168)/(58-91) 168/81     Weight: 111.2 kg (245 lb 2.8 oz)  Body mass index is 30.64 kg/m².    Physical Exam  Constitutional:       Appearance: Normal appearance. He is well-developed.   HENT:      Head: Normocephalic.   Eyes:      Conjunctiva/sclera: Conjunctivae normal.   Cardiovascular:      Rate and Rhythm: Regular rhythm. Bradycardia present.      Heart sounds: Normal heart sounds.   Pulmonary:      Effort: Pulmonary effort is normal.      Breath sounds: Normal breath sounds.   Abdominal:      General: Bowel sounds are normal. There is no distension.      Palpations: Abdomen is soft.      Tenderness: There is no abdominal tenderness.   Musculoskeletal:         General:  Normal range of motion.      Cervical back: Normal range of motion and neck supple.   Skin:     General: Skin is warm and dry.   Neurological:      Mental Status: He is alert and oriented to person, place, and time.      GCS: GCS eye subscore is 4. GCS verbal subscore is 5. GCS motor subscore is 6.      Motor: Motor function is intact.   Psychiatric:         Mood and Affect: Mood normal.         Speech: Speech normal.         Behavior: Behavior normal.           Significant Labs: All pertinent labs within the past 24 hours have been reviewed.  CBC:   Recent Labs   Lab 03/27/22 1934 03/28/22  0512   WBC 6.30 5.39   HGB 12.8* 13.0*   HCT 37.7* 39.3*    230     CMP:   Recent Labs   Lab 03/27/22 1934 03/28/22 0512    143   K 3.8 3.8    106   CO2 26 27    163*   BUN 10 11   CREATININE 0.8 0.8   CALCIUM 9.2 9.0   PROT 6.8 6.5   ALBUMIN 3.5 3.3*   BILITOT 0.5 0.3   ALKPHOS 88 96   AST 35 30   ALT 28 29   ANIONGAP 9 10   EGFRNONAA >60 >60       Significant Imaging: I have reviewed all pertinent imaging results/findings within the past 24 hours.    Assessment/Plan:     * Cellulitis of hand, left  XR- Metallic foreign body identified in the palmar surface of the left hand unchanged since at least 2016 othe.  Soft tissue swelling of the dorsum.    Consult Orthopedics  NPO  Morphine   IVF  Clindamycin        Hypothyroidism  Continue levothyroxine      Mixed hyperlipidemia  Continue Lipitor        VTE Risk Mitigation (From admission, onward)         Ordered     IP VTE HIGH RISK PATIENT  Once         03/28/22 2239     Place sequential compression device  Until discontinued         03/28/22 2239     Reason for No Pharmacological VTE Prophylaxis  Once        Question:  Reasons:  Answer:  Risk of Bleeding    03/28/22 2239     Place sequential compression device  Until discontinued         03/28/22 2231                   German Galindo NP  Department of Hospital Medicine   [facility])

## 2022-03-29 NOTE — PLAN OF CARE
03/29/22 0734   Final Note   Assessment Type Final Discharge Note   Anticipated Discharge Disposition Short Term

## 2022-03-29 NOTE — ASSESSMENT & PLAN NOTE
XR- Metallic foreign body identified in the palmar surface of the left hand unchanged since at least 2016 othe.  Soft tissue swelling of the dorsum.    Consult Orthopedics  NPO  Morphine   IVF  Clindamycin

## 2022-03-29 NOTE — NURSING
Patient is awake and laying on his back.  He is AO x4.  He complains of a pain level of 9. Will check when next pain med is due.  Otherwise, he has no other complaints.  He is aware of the plans for today. Will continue to monitor.

## 2022-03-29 NOTE — HPI
The patient is a 45yo man with a past medical history of DM2, HTN, HLD and hypothyroidism.  He also has a history of remote left finger amputations with residual metal in place (cannot get MRI).  He recently sustained a left hand injury this past Friday, 3/25/22, after cutting his hand on a .  He went to [place] and had stiches placed at that time, then discharged on Augmentin after local washout.  He returned to the ED at [facility] on late yesterday due to worsening cellulitis and local swelling and inflammation about the suture area.  He was placed on Clindamycin, and ID was consulted as well.  He is also developing new increased pain and decreased range of motion to the left hand, especially the thumb.  The patient was transferred to [facility] for further management of his hand cellulitis and Hand Surgery consult.

## 2022-03-29 NOTE — ASSESSMENT & PLAN NOTE
XR- Metallic foreign body identified in the palmar surface of the left hand unchanged since at least 2016 othe.  Soft tissue swelling of the dorsum.    Consult Orthopedics    Continue iv Clindamycin and pain meds  Improving from prior

## 2022-03-30 ENCOUNTER — TELEPHONE (OUTPATIENT)
Dept: MEDSURG UNIT | Facility: HOSPITAL | Age: 47
End: 2022-03-30
Payer: MEDICAID

## 2022-03-30 VITALS
TEMPERATURE: 97 F | DIASTOLIC BLOOD PRESSURE: 83 MMHG | HEIGHT: 75 IN | WEIGHT: 245.19 LBS | HEART RATE: 66 BPM | BODY MASS INDEX: 30.49 KG/M2 | OXYGEN SATURATION: 97 % | SYSTOLIC BLOOD PRESSURE: 156 MMHG | RESPIRATION RATE: 18 BRPM

## 2022-03-30 DIAGNOSIS — E11.9 TYPE 2 DIABETES MELLITUS WITHOUT COMPLICATION: ICD-10-CM

## 2022-03-30 LAB
ESTIMATED AVG GLUCOSE: 123 MG/DL (ref 68–131)
HBA1C MFR BLD: 5.9 % (ref 4–5.6)
POCT GLUCOSE: 122 MG/DL (ref 70–110)
POCT GLUCOSE: 147 MG/DL (ref 70–110)
T4 FREE SERPL-MCNC: 0.9 NG/DL (ref 0.71–1.51)
TSH SERPL DL<=0.005 MIU/L-ACNC: 3.65 UIU/ML (ref 0.4–4)

## 2022-03-30 PROCEDURE — 25000003 PHARM REV CODE 250: Performed by: NURSE PRACTITIONER

## 2022-03-30 PROCEDURE — 84439 ASSAY OF FREE THYROXINE: CPT | Performed by: INTERNAL MEDICINE

## 2022-03-30 PROCEDURE — 63600175 PHARM REV CODE 636 W HCPCS: Performed by: INTERNAL MEDICINE

## 2022-03-30 PROCEDURE — 36415 COLL VENOUS BLD VENIPUNCTURE: CPT | Performed by: INTERNAL MEDICINE

## 2022-03-30 PROCEDURE — 83036 HEMOGLOBIN GLYCOSYLATED A1C: CPT | Performed by: INTERNAL MEDICINE

## 2022-03-30 PROCEDURE — 84443 ASSAY THYROID STIM HORMONE: CPT | Performed by: INTERNAL MEDICINE

## 2022-03-30 PROCEDURE — 99239 PR HOSPITAL DISCHARGE DAY,>30 MIN: ICD-10-PCS | Mod: ,,, | Performed by: INTERNAL MEDICINE

## 2022-03-30 PROCEDURE — 25000003 PHARM REV CODE 250: Performed by: INTERNAL MEDICINE

## 2022-03-30 PROCEDURE — 99239 HOSP IP/OBS DSCHRG MGMT >30: CPT | Mod: ,,, | Performed by: INTERNAL MEDICINE

## 2022-03-30 RX ORDER — OXYCODONE AND ACETAMINOPHEN 10; 325 MG/1; MG/1
1 TABLET ORAL EVERY 6 HOURS PRN
Qty: 20 TABLET | Refills: 0 | Status: SHIPPED | OUTPATIENT
Start: 2022-03-30 | End: 2023-03-30

## 2022-03-30 RX ORDER — OXYCODONE AND ACETAMINOPHEN 10; 325 MG/1; MG/1
1 TABLET ORAL EVERY 6 HOURS PRN
Status: DISCONTINUED | OUTPATIENT
Start: 2022-03-30 | End: 2022-03-30 | Stop reason: HOSPADM

## 2022-03-30 RX ORDER — CLINDAMYCIN HYDROCHLORIDE 300 MG/1
600 CAPSULE ORAL EVERY 6 HOURS
Qty: 40 CAPSULE | Refills: 0 | Status: SHIPPED | OUTPATIENT
Start: 2022-03-30 | End: 2022-04-04

## 2022-03-30 RX ORDER — TRAZODONE HYDROCHLORIDE 50 MG/1
TABLET ORAL
Qty: 30 TABLET | Refills: 3
Start: 2022-03-30

## 2022-03-30 RX ADMIN — GABAPENTIN 600 MG: 300 CAPSULE ORAL at 02:03

## 2022-03-30 RX ADMIN — HYDROMORPHONE HYDROCHLORIDE 0.5 MG: 1 INJECTION, SOLUTION INTRAMUSCULAR; INTRAVENOUS; SUBCUTANEOUS at 12:03

## 2022-03-30 RX ADMIN — HYDROCODONE BITARTRATE AND ACETAMINOPHEN 1 TABLET: 10; 325 TABLET ORAL at 04:03

## 2022-03-30 RX ADMIN — CLINDAMYCIN IN 5 PERCENT DEXTROSE 900 MG: 18 INJECTION, SOLUTION INTRAVENOUS at 04:03

## 2022-03-30 RX ADMIN — GABAPENTIN 600 MG: 300 CAPSULE ORAL at 08:03

## 2022-03-30 RX ADMIN — OXYCODONE HYDROCHLORIDE AND ACETAMINOPHEN 1 TABLET: 10; 325 TABLET ORAL at 02:03

## 2022-03-30 RX ADMIN — ATORVASTATIN CALCIUM 80 MG: 20 TABLET, FILM COATED ORAL at 08:03

## 2022-03-30 RX ADMIN — BUSPIRONE HYDROCHLORIDE 15 MG: 7.5 TABLET ORAL at 08:03

## 2022-03-30 NOTE — PLAN OF CARE
Education provided.  The patient was seen by attending and surgeon prior to discharge.  The patient is aware of treatment plan.

## 2022-03-30 NOTE — PLAN OF CARE
03/29/22 1934   Discharge Assessment   Assessment Type Discharge Planning Assessment   Confirmed/corrected address, phone number and insurance Yes   Confirmed Demographics Correct on Facesheet   Source of Information patient;family   Reason For Admission Cellulitis of hand, left   Lives With spouse   Do you expect to return to your current living situation? Yes   Do you have help at home or someone to help you manage your care at home? Yes   Who are your caregiver(s) and their phone number(s)? Wife - [phone]   Prior to hospitilization cognitive status: Alert/Oriented   Current cognitive status: Alert/Oriented   Walking or Climbing Stairs Difficulty none   Dressing/Bathing Difficulty none   Home Layout Able to live on 1st floor   Equipment Currently Used at Home none   Readmission within 30 days? No   Patient currently being followed by outpatient case management? No   Do you currently have service(s) that help you manage your care at home? No   Do you take prescription medications? Yes   Do you have prescription coverage? Yes   Coverage [facility] DUAL COMPLETE O SNP   Do you have any problems affording any of your prescribed medications? No   Is the patient taking medications as prescribed? yes   Who is going to help you get home at discharge? Wife - [phone]   How do you get to doctors appointments? car, drives self;family or friend will provide   Are you on dialysis? No   Do you take coumadin? No   Discharge Plan A Home with family   DME Needed Upon Discharge  none   Discharge Plan discussed with: Patient;Spouse/sig other   Name(s) and Number(s) Milton [phone]   Discharge Barriers Identified None   Relationship/Environment   Name(s) of Who Lives With Patient Milton [phone]   [facility] ([facility])  Initial Discharge Assessment       Primary Care Provider: Kristal Warren MD    Admission Diagnosis:  Laceration of left hand with infection [S61.412A, L08.9]    Admission Date: 3/28/2022  Expected Discharge Date:     Discharge Barriers Identified: (P) None    Payor: [facility] MANAGED MCARE / Plan: [facility] DUAL COMPLETE HMO SNP / Product Type: Medicare Advantage /     Extended Emergency Contact Information  Primary Emergency Contact: Liz Alonzo  Address: [address] [place] [address] [address]           [place] [address] [facility]  Mobile Phone: [phone]  Relation: Spouse    Discharge Plan A: (P) Home with family         [facility] [place] - [address]  [address]  [place] [address]  Phone: [phone] Fax: [phone]      Initial Assessment (most recent)       Adult Discharge Assessment - 03/29/22 1934          Discharge Assessment    Assessment Type Discharge Planning Assessment     Confirmed/corrected address, phone number and insurance Yes     Confirmed Demographics Correct on Facesheet     Source of Information patient;family     Reason For Admission Cellulitis of hand, left (P)      Lives With spouse (P)      Do you expect to return to your current living situation? Yes (P)      Do you have help at home or someone to help you manage your care at home? Yes (P)      Who are your caregiver(s) and their phone number(s)? Wife - [phone] (P)      Prior to hospitilization cognitive status: Alert/Oriented (P)      Current cognitive status: Alert/Oriented (P)      Walking or Climbing Stairs Difficulty none (P)      Dressing/Bathing Difficulty none (P)      Home Layout Able to live on 1st floor (P)      Equipment Currently Used at Home none (P)      Readmission within 30 days? No (P)      Patient currently being followed by outpatient case management? No (P)      Do you currently have service(s) that help you manage your care at home? No (P)      Do you take prescription medications? Yes (P)      Do you have prescription coverage? Yes (P)      Coverage  [facility] DUAL COMPLETE HMO SNP (P)      Do you have any problems affording any of your prescribed medications? No (P)      Is the patient taking medications as prescribed? yes (P)      Who is going to help you get home at discharge? Wife - [phone] (P)      How do you get to doctors appointments? car, drives self;family or friend will provide (P)      Are you on dialysis? No (P)      Do you take coumadin? No (P)      Discharge Plan A Home with family (P)      DME Needed Upon Discharge  none (P)      Discharge Plan discussed with: Patient;Spouse/sig other (P)      Name(s) and Number(s) Wife - [phone] (P)      Discharge Barriers Identified None (P)         Relationship/Environment    Name(s) of Who Lives With Patient Wife - [phone] (P)

## 2022-03-30 NOTE — NURSING
Patient was asleep upon entering the room.  Woke after calling his name.  AAOx4.  He complains of pain.  Will give pain med.  Patient understands the plan for today.  He voices no other complaints.  Will continue to monitor.

## 2022-03-30 NOTE — CONSULTS
[facility])  Orthopedics  Consult Note    Patient Name: Michael Alonzo Sr.  MRN: [id number]  Admission Date: 3/28/2022  Hospital Length of Stay: 2 days  Attending Provider: Pamela Dowling MD  Primary Care Provider: Kristal Warren MD  Follow-up For: Procedure(s) (LRB):  TRANSFER, TENDON, HAND (Left)    Post-Operative Day:    Subjective:     Principal Problem:Cellulitis of hand, left    Principal Orthopedic Problem: The patient is a 45yo man rel[facility] [facility].  He is disabled due to a back injury which required 5 previous back surgeries.  He also reports apast medical history of left thumb extensor tendon laceration about 8 years ago.  He reports a history of  DM2, HTN, HLD and hypothyroidism.  He apparently sustained a left hand injury this past Friday, 3/25/22, after cutting his hand on a .  He went to [place] and had the wound cleaned and stiches placed at that time, then discharged on Augmentin.  He returned to the ED at [facility] about 3 days ago due to worsening redness and local swelling and inflammation about the suture area.  He was placed on Clindamycin, and ID was consulted as well.  he was diagnosed with cellulitis.  He notes that the redness, swelling and pain has mostly resolved but he still has difficulty with use of his index finger.  He reports that he has not been able to extend his thumb since his thumb injury about 8 years ago.The patient was transferred to [facility] for further management of his hand cellulitis and Hand Surgery consult.       Interval History: the patient reports improvement with less discomfort today.  Still unable to extend the index finger.    Review of patient's allergies indicates:   Allergen Reactions    Amitriptyline Anaphylaxis and Itching    Decadron [dexamethasone sodium phosphate] Anaphylaxis    Paroxetine Anaphylaxis    Tramadol Anaphylaxis    Tomato (solanum lycopersicum) Hives       Current Facility-Administered  "Medications   Medication    acetaminophen tablet 650 mg    atorvastatin tablet 80 mg    busPIRone tablet 15 mg    clindamycin in D5W 900 mg/50 mL IVPB 900 mg    dextrose 10% bolus 125 mL    dextrose 10% bolus 250 mL    docusate sodium capsule 100 mg    DULoxetine DR capsule 30 mg    gabapentin capsule 600 mg    glucagon (human recombinant) injection 1 mg    HYDROmorphone injection 0.5 mg    insulin aspart U-100 pen 1-10 Units    levothyroxine tablet 25 mcg    magnesium oxide tablet 800 mg    magnesium oxide tablet 800 mg    melatonin tablet 9 mg    nicotine 21 mg/24 hr 1 patch    ondansetron injection 4 mg    oxyCODONE-acetaminophen  mg per tablet 1 tablet    potassium bicarbonate disintegrating tablet 35 mEq    potassium bicarbonate disintegrating tablet 50 mEq    potassium bicarbonate disintegrating tablet 60 mEq    sodium chloride 0.9% flush 10 mL     Objective:     Vital Signs (Most Recent):  Temp: 97.3 °F (36.3 °C) (03/30/22 1552)  Pulse: 66 (03/30/22 1600)  Resp: 18 (03/30/22 1552)  BP: (!) 156/83 (03/30/22 1552)  SpO2: 97 % (03/30/22 1552) Vital Signs (24h Range):  Temp:  [18 °F (-7.8 °C)-98.7 °F (37.1 °C)] 97.3 °F (36.3 °C)  Pulse:  [44-66] 66  Resp:  [17-20] 18  SpO2:  [96 %-98 %] 97 %  BP: (130-172)/(69-87) 156/83     Weight: 111.2 kg (245 lb 2.8 oz)  Height: 6' 3" (190.5 cm)  Body mass index is 30.64 kg/m².      Intake/Output Summary (Last 24 hours) at 3/30/2022 1625  Last data filed at 3/30/2022 1313  Gross per 24 hour   Intake 540 ml   Output --   Net 540 ml       Ortho/SPM Exam   The patient is comfortable with exam alert and appropriate.  Company by his wife today. Left hand exam reveals a well-healed scar of about 2-1/2 cm across the thumb proximal phalanx near the metacarpal phalangeal joint.  He does not have active extension of the thumb distal joint.  He reports that this causes him significant limitations.  There is an acute laceration curvilinear proximal base flap " extending from the second webspace across the index metacarpal phalangeal joint and then extending proximally a couple of centimeters.  The wound is clean and dry.  Sutures are in place.  There is no significant erythema, minimal tenderness.  There is no fluctuance with mild residual swelling.  There is a 30 extension lag of the index MP joint with minimal active extension of the inner phalangeal joint.  There is intact flexor tendon function.  He has some diminished sensibility distal to the more acute laceration.there is intact circulation throughout the hand, normal sensibility the fingertips.    Significant Labs: All pertinent labs within the past 24 hours have been reviewed.    Significant Imaging: X-Ray: I have reviewed all pertinent results/findings and my personal findings are:  there is a small osseous defect of the dorsal aspect of the second metacarpal head in the area of his wound.  There is a metallic fragment near the palmar surface of the ring finger MP joint.    Assessment/Plan:     Mr. 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  He also has an acute laceration of the extensor digitorum continence to the index finger as well as the extensor indices proprius tendons.  He appears to have had a related cellulitis of his hand, resolving. I explained to the patient his condition, natural history and treatment options. We discussed the option of non operative management with splinting and therapy.  We also discussed the option of exploration and extensor tendon repair with possible EDC repair and the EIP to EPL tendon transfer to restore his thumb extension.  He would benefit from oral antibiotics to allow the cellulitis to completely resolved prior to this elective procedure which he states he will be available for on Tuesday. The potential benefits as well as the potential risk and complications of treatment options were reviewed and all  questions were answered.  We discussed the rehabilitation, and limitations as well as reasonable expectations of the treatment options.  Thank you for the opportunity to do spate in the care of this nice gentleman.    Active Diagnoses:    Diagnosis Date Noted POA    PRINCIPAL PROBLEM:  Cellulitis of hand, left [L03.114] 03/28/2022 Yes    Tobacco use [Z72.0] 03/29/2022 Yes    Anxiety [F41.9] 03/29/2022 Yes    Bradycardia [R00.1] 03/29/2022 Yes    Type 2 diabetes mellitus, without long-term current use of insulin [E11.9] 03/29/2022 Yes    Degeneration of lumbar intervertebral disc [M51.36] 06/15/2018 Yes    Hypothyroidism [E03.9] 07/20/2017 Yes    Mixed hyperlipidemia [E78.2] 07/14/2017 Yes      Problems Resolved During this Admission:         Claude S. Williams Iv, MD  Orthopedics  [facility])

## 2022-04-03 NOTE — PROGRESS NOTES
[facility] Medicine  Progress Note    Patient Name: Michael Alonzo Sr.  MRN: [id number]  Patient Class: IP- Inpatient   Admission Date: 3/28/2022  Length of Stay: 1 day  Attending Physician: Pamela Dowling MD  Primary Care Provider: Kristal Warren MD        Subjective:     Principal Problem:Cellulitis of hand, left        HPI:  The patient is a 47yo man with a past medical history of DM2, HTN, HLD and hypothyroidism.  He also has a history of remote left finger amputations with residual metal in place (cannot get MRI).  He recently sustained a left hand injury this past Friday, 3/25/22, after cutting his hand on a .  He went to [place] and had stiches placed at that time, then discharged on Augmentin after local washout.  He returned to the ED at [facility] on late yesterday due to worsening cellulitis and local swelling and inflammation about the suture area.  He was placed on Clindamycin, and ID was consulted as well.  He is also developing new increased pain and decreased range of motion to the left hand, especially the thumb.  The patient was transferred to [facility] for further management of his hand cellulitis and Hand Surgery consult.         Overview/Hospital Course:  No notes on file    Interval History: swelling and redness of hand better,cant move the second finger.    Review of Systems   Constitutional:  Negative for chills and fever.   HENT:  Negative for trouble swallowing.    Respiratory:  Negative for cough and shortness of breath.    Cardiovascular:  Negative for chest pain and leg swelling.   Gastrointestinal:  Negative for abdominal pain, blood in stool, nausea and vomiting.   Genitourinary:  Negative for dysuria and hematuria.   Musculoskeletal:  Positive for arthralgias. Negative for gait problem.   Skin:  Positive for wound.   Neurological:  Negative for dizziness, syncope, light-headedness and headaches.   Psychiatric/Behavioral:  Negative for  confusion.    Objective:     Vital Signs (Most Recent):  Temp: 98.2 °F (36.8 °C) (03/29/22 1130)  Pulse: (!) 51 (03/29/22 1200)  Resp: 20 (03/29/22 1352)  BP: (!) 141/72 (03/29/22 1130)  SpO2: 98 % (03/29/22 1130)   Vital Signs (24h Range):  Temp:  [97.1 °F (36.2 °C)-98.8 °F (37.1 °C)] 98.2 °F (36.8 °C)  Pulse:  [39-68] 51  Resp:  [16-20] 20  SpO2:  [97 %-99 %] 98 %  BP: (124-168)/(59-91) 141/72     Weight: 111.2 kg (245 lb 2.8 oz)  Body mass index is 30.64 kg/m².    Intake/Output Summary (Last 24 hours) at 3/29/2022 1426  Last data filed at 3/29/2022 0630  Gross per 24 hour   Intake 289.02 ml   Output --   Net 289.02 ml      Physical Exam  Vitals reviewed.   Constitutional:       General: He is not in acute distress.     Appearance: He is well-developed.   HENT:      Head: Normocephalic and atraumatic.   Eyes:      Extraocular Movements: Extraocular movements intact.      Pupils: Pupils are equal, round, and reactive to light.   Cardiovascular:      Rate and Rhythm: Normal rate and regular rhythm.   Pulmonary:      Effort: Pulmonary effort is normal. No respiratory distress.      Breath sounds: Normal breath sounds.   Abdominal:      General: Bowel sounds are normal. There is no distension.      Palpations: Abdomen is soft.      Tenderness: There is no abdominal tenderness.   Musculoskeletal:         General: Normal range of motion.      Cervical back: Normal range of motion and neck supple.      Comments: Left hand suture line seen, improved swelling and redness compared to pictures, chronic decreased rom of left thumb from prior injury, decreased rom of index finger.   Skin:     General: Skin is warm.      Findings: No rash.   Neurological:      Mental Status: He is alert and oriented to person, place, and time.   Psychiatric:         Mood and Affect: Mood normal.         Behavior: Behavior normal.       Significant Labs: All pertinent labs within the past 24 hours have been reviewed.    Significant Imaging: I  have reviewed all pertinent imaging results/findings within the past 24 hours.      Assessment/Plan:      * Cellulitis of hand, left  XR- Metallic foreign body identified in the palmar surface of the left hand unchanged since at least 2016 othe.  Soft tissue swelling of the dorsum.    Consult Orthopedics    Continue iv Clindamycin and pain meds  Improving from prior    Type 2 diabetes mellitus, without long-term current use of insulin  On metformin at home as needed  Lab Results   Component Value Date    HGBA1C 5.7% 02/23/2021     Iss, diabetic diet  Sugars ok      Bradycardia  Symptomatic  Tele monitor  Thyroid levels  Check ekg      Anxiety  Continue buspar and cymbalta      Tobacco use  Continue nicotine patch      Hypothyroidism  Continue levothyroxine  Check thyroid levels    Degeneration of lumbar intervertebral disc  Used to be on soma in past  Currently managed by neurontin      Mixed hyperlipidemia  Continue Lipitor        VTE Risk Mitigation (From admission, onward)         Ordered     IP VTE HIGH RISK PATIENT  Once         03/28/22 2239                Discharge Planning   CECY: 3/30/2022     Code Status: Prior   Is the patient medically ready for discharge?:     Reason for patient still in hospital (select all that apply): Patient trending condition and Treatment  Discharge Plan A: Home with family                  Pamela Dowling MD  Department of Hospital Medicine   [facility] - [facility] ([facility])

## 2022-04-03 NOTE — ASSESSMENT & PLAN NOTE
On metformin at home as needed  Lab Results   Component Value Date    HGBA1C 5.9 (H) 03/30/2022     Iss, diabetic diet  Sugars ok

## 2022-04-03 NOTE — DISCHARGE SUMMARY
[facility] Medicine  Discharge Summary      Patient Name: Michael Alonzo Sr.  MRN: [id number]  Patient Class: IP- Inpatient  Admission Date: 3/28/2022  Hospital Length of Stay: 2 days  Discharge Date and Time: 3/30/2022  4:49 PM  Attending Physician: No att. providers found   Discharging Provider: Pamela Dowling MD  Primary Care Provider: Kristal Warren MD      HPI:   The patient is a 47yo man with a past medical history of DM2, HTN, HLD and hypothyroidism.  He also has a history of remote left finger amputations with residual metal in place (cannot get MRI).  He recently sustained a left hand injury this past Friday, 3/25/22, after cutting his hand on a .  He went to [place] and had stiches placed at that time, then discharged on Augmentin after local washout.  He returned to the ED at [facility] on late yesterday due to worsening cellulitis and local swelling and inflammation about the suture area.  He was placed on Clindamycin, and ID was consulted as well.  He is also developing new increased pain and decreased range of motion to the left hand, especially the thumb.  The patient was transferred to [facility] for further management of his hand cellulitis and Hand Surgery consult.         * No surgery found *      Hospital Course:   Cellulitis of left hand improved with antibiotics.Patient was seen by orthopedics with tendon laceration of second finger with  plan for outpatient repair on Tuesday .Patient wanted to go home, discharged on oral antibiotics and pain meds.Ekg showed sinus bradycardia, patient was asymptomatic.He was continued on other home meds.       Goals of Care Treatment Preferences:  Code Status: Full Code      Consults:   Consults (From admission, onward)        Status Ordering Provider     Inpatient consult to Orthopedics  Once        Provider:  Idris Frye MD    Completed VAN PERRY          Type 2 diabetes mellitus, without  long-term current use of insulin  On metformin at home as needed  Lab Results   Component Value Date    HGBA1C 5.9 (H) 03/30/2022     Iss, diabetic diet  Sugars ok        Final Active Diagnoses:    Diagnosis Date Noted POA    PRINCIPAL PROBLEM:  Cellulitis of hand, left [L03.114] 03/28/2022 Yes    Tobacco use [Z72.0] 03/29/2022 Yes    Anxiety [F41.9] 03/29/2022 Yes    Bradycardia [R00.1] 03/29/2022 Yes    Type 2 diabetes mellitus, without long-term current use of insulin [E11.9] 03/29/2022 Yes    Degeneration of lumbar intervertebral disc [M51.36] 06/15/2018 Yes    Hypothyroidism [E03.9] 07/20/2017 Yes    Mixed hyperlipidemia [E78.2] 07/14/2017 Yes      Problems Resolved During this Admission:       Discharged Condition: stable    Disposition: Home or Self Care    Follow Up:   Follow-up Information     Claude S. Williams Iv, MD Follow up on 4/5/2022.    Specialty: Orthopedic Surgery  Contact information:  [address] [facility] [address] [phone]                       Patient Instructions:      Diet diabetic     Activity as tolerated       Significant Diagnostic Studies:   Lab Results   Component Value Date    WBC 5.90 03/29/2022    HGB 12.9 (L) 03/29/2022    HCT 38.4 (L) 03/29/2022    MCV 88 03/29/2022     03/29/2022       BMP  Lab Results   Component Value Date     03/29/2022    K 4.1 03/29/2022     03/29/2022    CO2 25 03/29/2022    BUN 8 03/29/2022    CREATININE 0.7 03/29/2022    CALCIUM 8.8 03/29/2022    ANIONGAP 10 03/29/2022    ESTGFRAFRICA >60 03/29/2022    EGFRNONAA >60 03/29/2022     X-Ray Hand 3 View Left  Narrative: EXAMINATION:  XR HAND COMPLETE 3 VIEW LEFT    CLINICAL HISTORY:  cellulitis;.    TECHNIQUE:  PA, lateral, and oblique views of the left hand were performed.    COMPARISON:  Left hand x-ray of January 9, 2016    FINDINGS:  There is a 7.5 mm linear metallic foreign body in the soft tissues on the palmar side of the proximal end of the proximal phalanx of  the left ring finger unchanged since 2016.  No fractures or other osseous abnormalities are identified at this time.  Juxta-articular bone erosion or Osteoporosis is not seen.  There is mild soft tissue swelling of the dorsum of the hand.  Subluxation at the wrist is not seen.  Impression: Metallic foreign body identified in the palmar surface of the left hand unchanged since at least 2016 othe.  Soft tissue swelling of the dorsum.    Electronically signed by: Garfield Harvey MD  Date:    03/28/2022  Time:    08:03        Pending Diagnostic Studies:     None         Medications:  Reconciled Home Medications:      Medication List           START taking these medications    clindamycin 300 MG capsule  Commonly known as: CLEOCIN  Take 2 capsules (600 mg total) by mouth every 6 (six) hours. for 5 days     oxyCODONE-acetaminophen  mg per tablet  Commonly known as: PERCOCET  Take 1 tablet by mouth every 6 (six) hours as needed for Pain.        CHANGE how you take these medications    traZODone 50 MG tablet  Commonly known as: DESYREL  __________________________  What changed:   · how much to take  · how to take this  · when to take this  · reasons to take this  · additional instructions        CONTINUE taking these medications    albuterol 90 mcg/actuation inhaler  Commonly known as: PROAIR HFA  Inhale 2 puffs into the lungs every 6 (six) hours as needed for Wheezing. Rescue     aspirin 81 MG EC tablet  Commonly known as: ECOTRIN  Take 1 tablet by mouth once daily.     busPIRone 15 MG tablet  Commonly known as: BUSPAR  Take 1 tablet (15 mg total) by mouth 2 (two) times daily.     diclofenac sodium 1 % Gel  Commonly known as: VOLTAREN  Apply topically 4 (four) times daily.     docusate sodium 100 MG capsule  Commonly known as: COLACE  Take 1 capsule by mouth daily as needed.     DULoxetine 30 MG capsule  Commonly known as: CYMBALTA  TAKE ONE CAPSULE BY MOUTH ONCE DAILY     gabapentin 600 MG tablet  Commonly known  as: NEURONTIN  TAKE ONE TABLET BY MOUTH THREE TIMES DAILY     levothyroxine 25 MCG tablet  Commonly known as: SYNTHROID  TAKE ONE TABLET BY MOUTH ONCE DAILY     metFORMIN 1000 MG tablet  Commonly known as: GLUCOPHAGE  TAKE ONE TABLET BY MOUTH ONCE DAILY     rosuvastatin 20 MG tablet  Commonly known as: CRESTOR  TAKE ONE TABLET BY MOUTH ONCE DAILY        STOP taking these medications    celecoxib 200 MG capsule  Commonly known as: CeleBREX     diphenhydrAMINE 25 mg tablet  Commonly known as: SOMINEX     diphenhydrAMINE-acetaminophen  mg Tab  Commonly known as: TYLENOL PM     fluticasone propionate 50 mcg/actuation nasal spray  Commonly known as: FLONASE     methocarbamoL 500 MG Tab  Commonly known as: ROBAXIN     mupirocin 2 % ointment  Commonly known as: BACTROBAN     POLYETHYLENE GLYCOL 3350 ORAL            Indwelling Lines/Drains at time of discharge:   Lines/Drains/Airways     None                 Time spent on the discharge of patient: 35 minutes         Pamela Dowling MD  Department of Hospital Medicine  [facility] ([facility]

## 2022-04-03 NOTE — HOSPITAL COURSE
Cellulitis of left hand improved with antibiotics.Patient was seen by orthopedics with tendon laceration of second finger with  plan for outpatient repair on Tuesday .Patient wanted to go home, discharged on oral antibiotics and pain meds.Ekg showed sinus bradycardia, patient was asymptomatic.He was continued on other home meds.

## 2022-04-04 ENCOUNTER — HOSPITAL ENCOUNTER (OUTPATIENT)
Dept: PREADMISSION TESTING | Facility: OTHER | Age: 47
Discharge: HOME | End: 2022-04-04
Attending: ORTHOPAEDIC SURGERY
Payer: MEDICARE

## 2022-04-04 ENCOUNTER — PATIENT MESSAGE (OUTPATIENT)
Dept: ADMINISTRATIVE | Facility: HOSPITAL | Age: 47
End: 2022-04-04
Payer: MEDICAID

## 2022-04-04 VITALS
BODY MASS INDEX: 30.37 KG/M2 | TEMPERATURE: 97 F | HEIGHT: 75 IN | DIASTOLIC BLOOD PRESSURE: 74 MMHG | WEIGHT: 244.25 LBS | SYSTOLIC BLOOD PRESSURE: 133 MMHG | HEART RATE: 72 BPM | OXYGEN SATURATION: 97 %

## 2022-04-04 RX ORDER — SODIUM CHLORIDE, SODIUM LACTATE, POTASSIUM CHLORIDE, CALCIUM CHLORIDE 600; 310; 30; 20 MG/100ML; MG/100ML; MG/100ML; MG/100ML
INJECTION, SOLUTION INTRAVENOUS CONTINUOUS
Status: CANCELLED | OUTPATIENT
Start: 2022-04-04

## 2022-04-04 RX ORDER — LIDOCAINE HYDROCHLORIDE 10 MG/ML
0.5 INJECTION, SOLUTION EPIDURAL; INFILTRATION; INTRACAUDAL; PERINEURAL ONCE
Status: CANCELLED | OUTPATIENT
Start: 2022-04-04 | End: 2022-04-04

## 2022-04-04 RX ORDER — PREGABALIN 75 MG/1
75 CAPSULE ORAL ONCE
Status: CANCELLED | OUTPATIENT
Start: 2022-04-04 | End: 2022-04-04

## 2022-04-04 RX ORDER — ACETAMINOPHEN 500 MG
1000 TABLET ORAL
Status: CANCELLED | OUTPATIENT
Start: 2022-04-04 | End: 2022-04-04

## 2022-04-04 NOTE — DISCHARGE INSTRUCTIONS
Information to Prepare you for your Surgery    PRE-ADMIT TESTING -  [phone]    [address] [facility]          Your surgery has been scheduled at [facility]. We are pleased to have the opportunity to serve you. For Further Information please call [phone].    On the day of surgery please report to the Information Desk on the 1st floor.    CONTACT YOUR PHYSICIAN'S OFFICE THE DAY PRIOR TO YOUR SURGERY TO OBTAIN YOUR ARRIVAL TIME.     The evening before surgery do not eat anything after 9 p.m. ( this includes hard candy, chewing gum and mints).  You may only have GATORADE, POWERADE AND WATER  from 9 p.m. until you leave your home.   DO NOT DRINK ANY LIQUIDS ON THE WAY TO THE HOSPITAL.      Why does your anesthesiologist allow you to drink Gatorade/Powerade before surgery?  Gatorade/Powerade helps to increase your comfort before surgery and to decrease your nausea after surgery. The carbohydrates in Gatorade/Powerade help reduce your body's stress response to surgery.  If you are a diabetic-drink only water prior to surgery.      Current Visitor policy(12/27/2021) - Patients may have 2 visitors pre and post procedure. Only 2 visitors will be allowed in the Surgical building with the patient.     SPECIAL MEDICATION INSTRUCTIONS: TAKE medications checked off by the Anesthesiologist on your Medication List.    Angiogram Patients: Take medications as instructed by your physician, including aspirin.     Surgery Patients:    If you take ASPIRIN - Your PHYSICIAN/SURGEON will need to inform you IF/OR when you need to stop taking aspirin prior to your surgery.     Do Not take any medications containing IBUPROFEN.    Do Not Wear any make-up (especially eye make-up) to surgery. Please remove any false eyelashes or eyelash extensions. If you arrive the day of surgery with makeup/eyelashes on you will be required to remove prior to surgery. (There is a risk of corneal  abrasions if eye makeup/eyelash extensions are not removed)      Leave all valuables at home.   Do Not wear any jewelry or watches, including any metal in body piercings. Jewelry must be removed prior to coming to the hospital.  There is a possibility that rings that are unable to be removed may be cut off if they are on the surgical extremity.    Please remove all hair extensions, wigs, clips and any other metal accessories/ ornaments from your hair.  These items may pose a flammable/fire risk in Surgery and must be removed.    Do not shave your surgical area at least 5 days prior to your surgery. The surgical prep will be performed at the hospital according to Infection Control regulations.    Contact Lens must be removed before surgery. Either do not wear the contact lens or bring a case and solution for storage.  Please bring a container for eyeglasses or dentures as required.  Bring any paperwork your physician has provided, such as consent forms,  history and physicals, doctor's orders, etc.   Bring comfortable clothes that are loose fitting to wear upon discharge. Take into consideration the type of surgery being performed.  Maintain your diet as advised per your physician the day prior to surgery.      Adequate rest the night before surgery is advised.   Park in the Parking lot behind the hospital or in the [employer] [facility] across the street from the parking lot. Parking is complimentary.  If you will be discharged the same day as your procedure, please arrange for a responsible adult to drive you home or to accompany you if traveling by taxi.   YOU WILL NOT BE PERMITTED TO DRIVE OR TO LEAVE THE HOSPITAL ALONE AFTER SURGERY.   If you are being discharged the same day, it is strongly recommended that you arrange for someone to remain with you for the first 24 hrs following your surgery.    The Surgeon will speak to your family/visitor after your surgery regarding the outcome of your surgery and post op  care.  The Surgeon may speak to you after your surgery, but there is a possibility you may not remember the details.  Please check with your family members regarding the conversation with the Surgeon.    We strongly recommend whoever is bringing you home be present for discharge instructions.  This will ensure a thorough understanding for your post op home care.    ALL CHILDREN MUST ALWAYS BE ACCOMPANIED BY AN ADULT.    Visitors-Refer to current Visitor policy handouts.    Thank you for your cooperation.  The Staff of [facility].            Bathing Instructions with Hibiclens    Shower the evening before and morning of your procedure with Hibiclens:  Wash your face with water and your regular face wash/soap  Apply Hibiclens directly on your skin or on a wet washcloth and wash gently. When showering: Move away from the shower stream when applying Hibiclens to avoid rinsing off too soon.  Rinse thoroughly with warm water  Do not dilute Hibiclens        Dry off as usual, do not use any deodorant, powder, body lotions, perfume, after shave or cologne.

## 2022-05-11 ENCOUNTER — HOSPITAL ENCOUNTER (EMERGENCY)
Facility: HOSPITAL | Age: 47
Discharge: HOME | End: 2022-05-11
Attending: EMERGENCY MEDICINE
Payer: MEDICAID

## 2022-05-11 VITALS
OXYGEN SATURATION: 96 % | SYSTOLIC BLOOD PRESSURE: 116 MMHG | TEMPERATURE: 98 F | WEIGHT: 240 LBS | DIASTOLIC BLOOD PRESSURE: 57 MMHG | HEART RATE: 82 BPM | RESPIRATION RATE: 17 BRPM | BODY MASS INDEX: 30 KG/M2

## 2022-05-11 DIAGNOSIS — T25.222A BURN, FOOT, SECOND DEGREE, LEFT, INITIAL ENCOUNTER: Primary | ICD-10-CM

## 2022-05-11 PROCEDURE — 96365 THER/PROPH/DIAG IV INF INIT: CPT

## 2022-05-11 PROCEDURE — 25000003 PHARM REV CODE 250: Performed by: EMERGENCY MEDICINE

## 2022-05-11 PROCEDURE — 96375 TX/PRO/DX INJ NEW DRUG ADDON: CPT

## 2022-05-11 PROCEDURE — 99284 EMERGENCY DEPT VISIT MOD MDM: CPT | Mod: 25

## 2022-05-11 PROCEDURE — 96376 TX/PRO/DX INJ SAME DRUG ADON: CPT

## 2022-05-11 PROCEDURE — 63600175 PHARM REV CODE 636 W HCPCS: Performed by: EMERGENCY MEDICINE

## 2022-05-11 RX ORDER — HYDROMORPHONE HYDROCHLORIDE 2 MG/ML
1 INJECTION, SOLUTION INTRAMUSCULAR; INTRAVENOUS; SUBCUTANEOUS
Status: COMPLETED | OUTPATIENT
Start: 2022-05-11 | End: 2022-05-11

## 2022-05-11 RX ORDER — CLINDAMYCIN PHOSPHATE 900 MG/50ML
900 INJECTION, SOLUTION INTRAVENOUS
Status: COMPLETED | OUTPATIENT
Start: 2022-05-11 | End: 2022-05-11

## 2022-05-11 RX ORDER — CLINDAMYCIN HYDROCHLORIDE 300 MG/1
300 CAPSULE ORAL EVERY 8 HOURS
Qty: 21 CAPSULE | Refills: 0 | Status: SHIPPED | OUTPATIENT
Start: 2022-05-11 | End: 2022-05-18

## 2022-05-11 RX ORDER — HYDROCODONE BITARTRATE AND ACETAMINOPHEN 10; 325 MG/1; MG/1
1 TABLET ORAL EVERY 4 HOURS PRN
Qty: 12 TABLET | Refills: 0 | Status: SHIPPED | OUTPATIENT
Start: 2022-05-11 | End: 2023-03-30

## 2022-05-11 RX ADMIN — CLINDAMYCIN PHOSPHATE 900 MG: 900 INJECTION, SOLUTION INTRAVENOUS at 12:05

## 2022-05-11 RX ADMIN — BACITRACIN, NEOMYCIN, POLYMYXIN B 1 EACH: 400; 3.5; 5 OINTMENT TOPICAL at 01:05

## 2022-05-11 RX ADMIN — HYDROMORPHONE HYDROCHLORIDE 1 MG: 2 INJECTION INTRAMUSCULAR; INTRAVENOUS; SUBCUTANEOUS at 12:05

## 2022-05-11 RX ADMIN — HYDROMORPHONE HYDROCHLORIDE 1 MG: 2 INJECTION, SOLUTION INTRAMUSCULAR; INTRAVENOUS; SUBCUTANEOUS at 01:05
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
## 2022-05-31 ENCOUNTER — PATIENT MESSAGE (OUTPATIENT)
Dept: ADMINISTRATIVE | Facility: HOSPITAL | Age: 47
End: 2022-05-31
Payer: MEDICAID

## 2022-06-30 NOTE — ASSESSMENT & PLAN NOTE
Continue levothyroxine  Check thyroid levels   MEDICATIONS  (STANDING):  albuterol/ipratropium for Nebulization 3 milliLiter(s) Nebulizer every 6 hours  aspirin enteric coated 81 milliGRAM(s) Oral daily  atorvastatin 80 milliGRAM(s) Oral at bedtime  calcium acetate 667 milliGRAM(s) Oral three times a day with meals  carvedilol 25 milliGRAM(s) Oral every 12 hours  chlorhexidine 2% Cloths 1 Application(s) Topical daily  cloNIDine 0.2 milliGRAM(s) Oral every 12 hours  cyanocobalamin 1000 MICROGram(s) Oral daily  heparin   Injectable 5000 Unit(s) SubCutaneous every 12 hours  hydrALAZINE 10 milliGRAM(s) Oral every 8 hours  NIFEdipine XL 60 milliGRAM(s) Oral daily  pantoprazole    Tablet 40 milliGRAM(s) Oral before breakfast  potassium chloride    Tablet ER 40 milliEquivalent(s) Oral once  senna 2 Tablet(s) Oral at bedtime    MEDICATIONS  (PRN):  acetaminophen     Tablet .. 650 milliGRAM(s) Oral every 6 hours PRN Temp greater or equal to 38C (100.4F), Mild Pain (1 - 3)  acetaminophen   IVPB .. 1000 milliGRAM(s) IV Intermittent once PRN pain  aluminum hydroxide/magnesium hydroxide/simethicone Suspension 30 milliLiter(s) Oral every 4 hours PRN Dyspepsia  hydrALAZINE Injectable 5 milliGRAM(s) IV Push every 6 hours PRN if SBP >170 or DBP >100  LORazepam   Injectable 1 milliGRAM(s) IV Push every 4 hours PRN Anxiety  melatonin 3 milliGRAM(s) Oral at bedtime PRN Insomnia  metoclopramide Injectable 5 milliGRAM(s) IV Push every 8 hours PRN nausea/vomiting

## 2022-07-06 DIAGNOSIS — E11.9 TYPE 2 DIABETES MELLITUS WITHOUT COMPLICATION, UNSPECIFIED WHETHER LONG TERM INSULIN USE: ICD-10-CM

## 2022-07-11 ENCOUNTER — PATIENT MESSAGE (OUTPATIENT)
Dept: ADMINISTRATIVE | Facility: HOSPITAL | Age: 47
End: 2022-07-11
Payer: MEDICARE

## 2022-07-27 DIAGNOSIS — Z12.11 COLON CANCER SCREENING: ICD-10-CM

## 2022-08-01 ENCOUNTER — PATIENT MESSAGE (OUTPATIENT)
Dept: ADMINISTRATIVE | Facility: HOSPITAL | Age: 47
End: 2022-08-01
Payer: MEDICARE

## 2022-08-24 ENCOUNTER — PATIENT MESSAGE (OUTPATIENT)
Dept: ADMINISTRATIVE | Facility: HOSPITAL | Age: 47
End: 2022-08-24
Payer: MEDICARE

## 2022-08-31 DIAGNOSIS — Z11.59 NEED FOR HEPATITIS C SCREENING TEST: ICD-10-CM

## 2022-10-11 ENCOUNTER — PATIENT MESSAGE (OUTPATIENT)
Dept: ADMINISTRATIVE | Facility: HOSPITAL | Age: 47
End: 2022-10-11
Payer: MEDICARE

## 2022-10-31 ENCOUNTER — PATIENT MESSAGE (OUTPATIENT)
Dept: FAMILY MEDICINE | Facility: CLINIC | Age: 47
End: 2022-10-31
Payer: MEDICARE

## 2023-01-09 ENCOUNTER — PATIENT MESSAGE (OUTPATIENT)
Dept: ADMINISTRATIVE | Facility: HOSPITAL | Age: 48
End: 2023-01-09
Payer: MEDICARE

## 2023-03-29 ENCOUNTER — TELEPHONE (OUTPATIENT)
Dept: PODIATRY | Facility: CLINIC | Age: 48
End: 2023-03-29
Payer: MEDICARE

## 2023-03-29 ENCOUNTER — TELEPHONE (OUTPATIENT)
Dept: FAMILY MEDICINE | Facility: CLINIC | Age: 48
End: 2023-03-29
Payer: MEDICARE

## 2023-03-29 NOTE — TELEPHONE ENCOUNTER
Called patient once again to see if he would like to go ahead and schedule an appointment to establish care in addition to urgent care visit today. Patient opted to do this as well. Soonest available is for tomorrow. Patient has opted to take tomorrow's regular visit vs. Today's urgent care visit with SERENA Patrick.

## 2023-03-29 NOTE — TELEPHONE ENCOUNTER
Patient states he was scheduled for appointment with Dr. Morris this evening for urgent medication refill/inhaler refill but was told had to be rescheduled. Patient is now scheduled for urgent visit with NP Michelle Patrick.

## 2023-03-29 NOTE — TELEPHONE ENCOUNTER
Spoke with the patient has increased sob w/o inhaler appt scheduled in morning for check up and est.care and rx renewals. Pt instructed to go to the er or urgent care is his sob worsens. Pt agreed.

## 2023-03-30 ENCOUNTER — OFFICE VISIT (OUTPATIENT)
Dept: FAMILY MEDICINE | Facility: CLINIC | Age: 48
End: 2023-03-30
Payer: MEDICARE

## 2023-03-30 ENCOUNTER — LAB VISIT (OUTPATIENT)
Dept: LAB | Facility: CLINIC | Age: 48
End: 2023-03-30
Payer: MEDICARE

## 2023-03-30 ENCOUNTER — TELEPHONE (OUTPATIENT)
Dept: FAMILY MEDICINE | Facility: CLINIC | Age: 48
End: 2023-03-30

## 2023-03-30 VITALS
BODY MASS INDEX: 32.23 KG/M2 | SYSTOLIC BLOOD PRESSURE: 136 MMHG | WEIGHT: 259.25 LBS | HEART RATE: 67 BPM | TEMPERATURE: 98 F | DIASTOLIC BLOOD PRESSURE: 80 MMHG | OXYGEN SATURATION: 98 % | HEIGHT: 75 IN

## 2023-03-30 DIAGNOSIS — E11.42 TYPE 2 DIABETES MELLITUS WITH DIABETIC POLYNEUROPATHY, WITHOUT LONG-TERM CURRENT USE OF INSULIN: ICD-10-CM

## 2023-03-30 DIAGNOSIS — J43.2 CENTRILOBULAR EMPHYSEMA: ICD-10-CM

## 2023-03-30 DIAGNOSIS — E78.2 MIXED HYPERLIPIDEMIA: ICD-10-CM

## 2023-03-30 DIAGNOSIS — F33.1 MODERATE EPISODE OF RECURRENT MAJOR DEPRESSIVE DISORDER: ICD-10-CM

## 2023-03-30 DIAGNOSIS — Z11.4 ENCOUNTER FOR SCREENING FOR HUMAN IMMUNODEFICIENCY VIRUS (HIV): ICD-10-CM

## 2023-03-30 DIAGNOSIS — M25.511 CHRONIC RIGHT SHOULDER PAIN: ICD-10-CM

## 2023-03-30 DIAGNOSIS — E03.9 HYPOTHYROIDISM, UNSPECIFIED TYPE: ICD-10-CM

## 2023-03-30 DIAGNOSIS — G89.29 CHRONIC RIGHT SHOULDER PAIN: ICD-10-CM

## 2023-03-30 DIAGNOSIS — J43.2 CENTRILOBULAR EMPHYSEMA: Primary | ICD-10-CM

## 2023-03-30 DIAGNOSIS — M51.36 DEGENERATION OF LUMBAR INTERVERTEBRAL DISC: ICD-10-CM

## 2023-03-30 DIAGNOSIS — F41.9 ANXIETY: ICD-10-CM

## 2023-03-30 DIAGNOSIS — F17.210 CIGARETTE NICOTINE DEPENDENCE WITHOUT COMPLICATION: ICD-10-CM

## 2023-03-30 DIAGNOSIS — Z11.59 NEED FOR HEPATITIS C SCREENING TEST: ICD-10-CM

## 2023-03-30 DIAGNOSIS — R07.9 CHEST PAIN, UNSPECIFIED TYPE: ICD-10-CM

## 2023-03-30 DIAGNOSIS — G89.4 CHRONIC PAIN SYNDROME: ICD-10-CM

## 2023-03-30 DIAGNOSIS — F32.A DEPRESSIVE DISORDER: ICD-10-CM

## 2023-03-30 PROBLEM — L03.114 CELLULITIS OF LEFT UPPER EXTREMITY: Status: RESOLVED | Noted: 2022-03-27 | Resolved: 2023-03-30

## 2023-03-30 PROBLEM — L03.114 CELLULITIS OF HAND, LEFT: Status: RESOLVED | Noted: 2022-03-28 | Resolved: 2023-03-30

## 2023-03-30 LAB
ALBUMIN SERPL BCP-MCNC: 3.8 G/DL (ref 3.5–5.2)
ALBUMIN/CREAT UR: 178.5 UG/MG (ref 0–30)
ALP SERPL-CCNC: 90 U/L (ref 55–135)
ALT SERPL W/O P-5'-P-CCNC: 31 U/L (ref 10–44)
ANION GAP SERPL CALC-SCNC: 10 MMOL/L (ref 8–16)
AST SERPL-CCNC: 21 U/L (ref 10–40)
BASOPHILS # BLD AUTO: 0.08 K/UL (ref 0–0.2)
BASOPHILS NFR BLD: 1.3 % (ref 0–1.9)
BILIRUB SERPL-MCNC: 0.4 MG/DL (ref 0.1–1)
BUN SERPL-MCNC: 13 MG/DL (ref 6–20)
CALCIUM SERPL-MCNC: 9.1 MG/DL (ref 8.7–10.5)
CHLORIDE SERPL-SCNC: 104 MMOL/L (ref 95–110)
CHOLEST SERPL-MCNC: 157 MG/DL (ref 120–199)
CHOLEST/HDLC SERPL: 5.8 {RATIO} (ref 2–5)
CO2 SERPL-SCNC: 25 MMOL/L (ref 23–29)
CREAT SERPL-MCNC: 0.9 MG/DL (ref 0.5–1.4)
CREAT UR-MCNC: 400 MG/DL (ref 23–375)
DIFFERENTIAL METHOD: NORMAL
EOSINOPHIL # BLD AUTO: 0.3 K/UL (ref 0–0.5)
EOSINOPHIL NFR BLD: 4.6 % (ref 0–8)
ERYTHROCYTE [DISTWIDTH] IN BLOOD BY AUTOMATED COUNT: 12.4 % (ref 11.5–14.5)
EST. GFR  (NO RACE VARIABLE): >60 ML/MIN/1.73 M^2
ESTIMATED AVG GLUCOSE: 108 MG/DL (ref 68–131)
GLUCOSE SERPL-MCNC: 105 MG/DL (ref 70–110)
HBA1C MFR BLD: 5.4 % (ref 4–5.6)
HCT VFR BLD AUTO: 42.2 % (ref 40–54)
HDLC SERPL-MCNC: 27 MG/DL (ref 40–75)
HDLC SERPL: 17.2 % (ref 20–50)
HGB BLD-MCNC: 14.5 G/DL (ref 14–18)
HIV 1+2 AB+HIV1 P24 AG SERPL QL IA: NORMAL
IMM GRANULOCYTES # BLD AUTO: 0.01 K/UL (ref 0–0.04)
IMM GRANULOCYTES NFR BLD AUTO: 0.2 % (ref 0–0.5)
LDLC SERPL CALC-MCNC: 87.8 MG/DL (ref 63–159)
LYMPHOCYTES # BLD AUTO: 2.4 K/UL (ref 1–4.8)
LYMPHOCYTES NFR BLD: 38.9 % (ref 18–48)
MCH RBC QN AUTO: 29.2 PG (ref 27–31)
MCHC RBC AUTO-ENTMCNC: 34.4 G/DL (ref 32–36)
MCV RBC AUTO: 85 FL (ref 82–98)
MICROALBUMIN UR DL<=1MG/L-MCNC: 714 UG/ML
MONOCYTES # BLD AUTO: 0.7 K/UL (ref 0.3–1)
MONOCYTES NFR BLD: 11.7 % (ref 4–15)
NEUTROPHILS # BLD AUTO: 2.7 K/UL (ref 1.8–7.7)
NEUTROPHILS NFR BLD: 43.3 % (ref 38–73)
NONHDLC SERPL-MCNC: 130 MG/DL
NRBC BLD-RTO: 0 /100 WBC
PLATELET # BLD AUTO: 304 K/UL (ref 150–450)
PMV BLD AUTO: 10.6 FL (ref 9.2–12.9)
POTASSIUM SERPL-SCNC: 3.7 MMOL/L (ref 3.5–5.1)
PROT SERPL-MCNC: 6.9 G/DL (ref 6–8.4)
RBC # BLD AUTO: 4.96 M/UL (ref 4.6–6.2)
SODIUM SERPL-SCNC: 139 MMOL/L (ref 136–145)
T4 FREE SERPL-MCNC: 1.01 NG/DL (ref 0.71–1.51)
TRIGL SERPL-MCNC: 211 MG/DL (ref 30–150)
TSH SERPL DL<=0.005 MIU/L-ACNC: 6.21 UIU/ML (ref 0.4–4)
WBC # BLD AUTO: 6.25 K/UL (ref 3.9–12.7)

## 2023-03-30 PROCEDURE — 93005 EKG 12-LEAD: ICD-10-PCS | Mod: ,,,

## 2023-03-30 PROCEDURE — 93005 ELECTROCARDIOGRAM TRACING: CPT | Mod: ,,,

## 2023-03-30 PROCEDURE — 84443 ASSAY THYROID STIM HORMONE: CPT

## 2023-03-30 PROCEDURE — 3079F DIAST BP 80-89 MM HG: CPT | Mod: CPTII,,,

## 2023-03-30 PROCEDURE — 1160F PR REVIEW ALL MEDS BY PRESCRIBER/CLIN PHARMACIST DOCUMENTED: ICD-10-PCS | Mod: CPTII,,,

## 2023-03-30 PROCEDURE — 85025 COMPLETE CBC W/AUTO DIFF WBC: CPT

## 2023-03-30 PROCEDURE — 99215 PR OFFICE/OUTPT VISIT, EST, LEVL V, 40-54 MIN: ICD-10-PCS | Mod: ,,,

## 2023-03-30 PROCEDURE — 86803 HEPATITIS C AB TEST: CPT | Performed by: STUDENT IN AN ORGANIZED HEALTH CARE EDUCATION/TRAINING PROGRAM

## 2023-03-30 PROCEDURE — 1159F MED LIST DOCD IN RCRD: CPT | Mod: CPTII,,,

## 2023-03-30 PROCEDURE — 80053 COMPREHEN METABOLIC PANEL: CPT

## 2023-03-30 PROCEDURE — 3008F BODY MASS INDEX DOCD: CPT | Mod: CPTII,,,

## 2023-03-30 PROCEDURE — 87389 HIV-1 AG W/HIV-1&-2 AB AG IA: CPT

## 2023-03-30 PROCEDURE — 84439 ASSAY OF FREE THYROXINE: CPT

## 2023-03-30 PROCEDURE — 93010 ELECTROCARDIOGRAM REPORT: CPT | Mod: ,,, | Performed by: INTERNAL MEDICINE

## 2023-03-30 PROCEDURE — 82570 ASSAY OF URINE CREATININE: CPT

## 2023-03-30 PROCEDURE — 3008F PR BODY MASS INDEX (BMI) DOCUMENTED: ICD-10-PCS | Mod: CPTII,,,

## 2023-03-30 PROCEDURE — 1159F PR MEDICATION LIST DOCUMENTED IN MEDICAL RECORD: ICD-10-PCS | Mod: CPTII,,,

## 2023-03-30 PROCEDURE — 3075F PR MOST RECENT SYSTOLIC BLOOD PRESS GE 130-139MM HG: ICD-10-PCS | Mod: CPTII,,,

## 2023-03-30 PROCEDURE — 80061 LIPID PANEL: CPT

## 2023-03-30 PROCEDURE — 83036 HEMOGLOBIN GLYCOSYLATED A1C: CPT

## 2023-03-30 PROCEDURE — 3079F PR MOST RECENT DIASTOLIC BLOOD PRESSURE 80-89 MM HG: ICD-10-PCS | Mod: CPTII,,,

## 2023-03-30 PROCEDURE — 1160F RVW MEDS BY RX/DR IN RCRD: CPT | Mod: CPTII,,,

## 2023-03-30 PROCEDURE — 3075F SYST BP GE 130 - 139MM HG: CPT | Mod: CPTII,,,

## 2023-03-30 PROCEDURE — 99215 OFFICE O/P EST HI 40 MIN: CPT | Mod: ,,,

## 2023-03-30 PROCEDURE — 93010 EKG 12-LEAD: ICD-10-PCS | Mod: ,,, | Performed by: INTERNAL MEDICINE

## 2023-03-30 RX ORDER — GABAPENTIN 800 MG/1
800 TABLET ORAL 3 TIMES DAILY
Qty: 90 TABLET | Refills: 11 | Status: SHIPPED | OUTPATIENT
Start: 2023-03-30 | End: 2024-03-05

## 2023-03-30 RX ORDER — ROSUVASTATIN CALCIUM 20 MG/1
20 TABLET, COATED ORAL DAILY
Qty: 90 TABLET | Refills: 3 | Status: SHIPPED | OUTPATIENT
Start: 2023-03-30

## 2023-03-30 RX ORDER — BUSPIRONE HYDROCHLORIDE 15 MG/1
15 TABLET ORAL 2 TIMES DAILY
Qty: 180 TABLET | Refills: 3 | Status: SHIPPED | OUTPATIENT
Start: 2023-03-30 | End: 2023-12-18

## 2023-03-30 RX ORDER — METFORMIN HYDROCHLORIDE 1000 MG/1
1000 TABLET ORAL DAILY
Qty: 90 TABLET | Refills: 3 | Status: SHIPPED | OUTPATIENT
Start: 2023-03-30

## 2023-03-30 RX ORDER — DULOXETIN HYDROCHLORIDE 30 MG/1
30 CAPSULE, DELAYED RELEASE ORAL DAILY
Qty: 90 CAPSULE | Refills: 3 | Status: SHIPPED | OUTPATIENT
Start: 2023-03-30 | End: 2023-12-18

## 2023-03-30 RX ORDER — DICLOFENAC SODIUM 10 MG/G
GEL TOPICAL 4 TIMES DAILY
Qty: 350 G | Refills: 2 | Status: SHIPPED | OUTPATIENT
Start: 2023-03-30

## 2023-03-30 RX ORDER — LEVOTHYROXINE SODIUM 25 UG/1
25 TABLET ORAL DAILY
Qty: 30 TABLET | Refills: 3 | Status: CANCELLED | OUTPATIENT
Start: 2023-03-30

## 2023-03-30 RX ORDER — ALBUTEROL SULFATE 90 UG/1
2 AEROSOL, METERED RESPIRATORY (INHALATION) EVERY 6 HOURS PRN
Qty: 17 G | Refills: 11 | Status: SHIPPED | OUTPATIENT
Start: 2023-03-30

## 2023-03-30 RX ORDER — GABAPENTIN 600 MG/1
600 TABLET ORAL 3 TIMES DAILY
Qty: 90 TABLET | Refills: 3 | Status: CANCELLED | OUTPATIENT
Start: 2023-03-30

## 2023-03-30 NOTE — PROGRESS NOTES
Subjective:       Patient ID: Michael Alonzo Sr. is a 47 y.o. male.    Chief Complaint: Establish Care, emphysemia (Needing rx renewal. C/o some increased wheezing and sob), Diabetes (Taking his medication as directed w/o any problems or concerns . No recent labs), Hypothyroidism (Taking his medication as directed w/o any problems or concerns ), Hyperlipidemia (Taking his medication as directed w/o any problems or concerns ), Shoulder Pain (Right shoulder.Ongoing for years. States having trouble moving arm now. Recalls no injury), and Cough (Mild. Dry hacky cough. No fever)    Patient presents to the clinic to establish care.     Patient Active Problem List:     Mixed hyperlipidemia     Radiculopathy affecting upper extremity     Right sided weakness     Generalized anxiety disorder     Conversion disorder     History of stroke     Chronic back pain     Degeneration of lumbar intervertebral disc     Moderate episode of recurrent major depressive disorder     Hypothyroidism     Nicotine dependence     Osteoarthrosis     Prediabetes     Centrilobular emphysema     Tobacco use     Anxiety     Bradycardia     Type 2 diabetes mellitus with diabetic polyneuropathy, without long-term current use of insulin    Has not been seen since 2021 in this office.     COPD- Has a history of COPD. Wears oxygen 2/5 Liters as needed and at night. States he is always SOB. Has been out of Albuterol inhaler. He does have a nebulizer at home. Has never seen Pulmonology.     Diabetes-   Lab Results       Component                Value               Date                       HGBA1C                   5.9 (H)             03/30/2022            Takes Metformin as prescribed.   Checks blood sugars daily- highest 140.   Tries to follow a diabetic diet.     Hypothyroidism-Takes Synthroid daily.     Hyperlipidemia-  Lab Results       Component                Value               Date                       CHOL                     170                  01/25/2021                 CHOL                     164                 07/26/2017                 CHOL                     201 (H)             07/14/2017            Lab Results       Component                Value               Date                       HDL                      32 (L)              01/25/2021                 HDL                      35 (L)              07/26/2017                 HDL                      28 (L)              07/14/2017            Lab Results       Component                Value               Date                       LDLCALC                  102.6               01/25/2021                 LDLCALC                  104.8               07/26/2017                 LDLCALC                  129.4               07/14/2017            No results found for: DLDL  Lab Results       Component                Value               Date                       TRIG                     177 (H)             01/25/2021                 TRIG                     121                 07/26/2017                 TRIG                     218 (H)             07/14/2017              f1 Lab Results       Component                Value               Date                       CHOLHDL                  18.8 (L)            01/25/2021                 CHOLHDL                  21.3                07/26/2017                 CHOLHDL                  13.9 (L)            07/14/2017            Takes Crestor daily without any reported side effects.     Reports occasional mid sternal chest pains. He happens at least every other day for the last month. States he will have times where he will have chest pains and break out into sweats. Reports current chest pain 3/10.     Reports chronic right shoulder pain. Has been ongoing for at least 8 months. Pain is constant and worse with movement. Has limited range of motion and cannot lift arm.     Requests referral to pain management. Has a history of chronic back pain. States he has  had multiple back surgeries.     Patient educated on plan of care, verbalized understanding.      Review of Systems   Constitutional:  Negative for activity change, appetite change, chills, diaphoresis and fever.   HENT:  Negative for congestion, ear pain, postnasal drip, sinus pressure, sneezing and sore throat.    Eyes:  Negative for pain, discharge, redness and itching.   Respiratory:  Positive for chest tightness. Negative for apnea, cough, shortness of breath and wheezing.    Cardiovascular:  Positive for chest pain. Negative for leg swelling.   Gastrointestinal:  Negative for abdominal distention, abdominal pain, constipation, diarrhea, nausea and vomiting.   Genitourinary:  Negative for difficulty urinating, dysuria, flank pain and frequency.   Musculoskeletal:  Positive for arthralgias, back pain and myalgias.        Right shoulder pain and limited range of motion   Skin:  Negative for color change, rash and wound.   Neurological:  Positive for numbness. Negative for dizziness.   All other systems reviewed and are negative.    Patient Active Problem List   Diagnosis    Mixed hyperlipidemia    Radiculopathy affecting upper extremity    Right sided weakness    Generalized anxiety disorder    Conversion disorder    History of stroke    Chronic back pain    Degeneration of lumbar intervertebral disc    Moderate episode of recurrent major depressive disorder    Hypothyroidism    Nicotine dependence    Osteoarthrosis    Prediabetes    Centrilobular emphysema    Tobacco use    Anxiety    Bradycardia    Type 2 diabetes mellitus with diabetic polyneuropathy, without long-term current use of insulin       Objective:      Physical Exam    Lab Results   Component Value Date    WBC 5.90 03/29/2022    HGB 12.9 (L) 03/29/2022    HCT 38.4 (L) 03/29/2022     03/29/2022    CHOL 170 01/25/2021    TRIG 177 (H) 01/25/2021    HDL 32 (L) 01/25/2021    ALT 27 03/29/2022    AST 19 03/29/2022     03/29/2022    K 4.1  "03/29/2022     03/29/2022    CREATININE 0.7 03/29/2022    BUN 8 03/29/2022    CO2 25 03/29/2022    TSH 3.652 03/30/2022    INR 0.9 07/27/2017    HGBA1C 5.9 (H) 03/30/2022     The 10-year ASCVD risk score (Vesna BRINK, et al., 2019) is: 16.6%    Values used to calculate the score:      Age: 47 years      Sex: Male      Is Non- : No      Diabetic: Yes      Tobacco smoker: Yes      Systolic Blood Pressure: 136 mmHg      Is BP treated: No      HDL Cholesterol: 32 mg/dL      Total Cholesterol: 170 mg/dL  Visit Vitals  /80 (BP Location: Left arm, Patient Position: Sitting, BP Method: Large (Manual))   Pulse 67   Temp 97.7 °F (36.5 °C) (Temporal)   Ht 6' 3" (1.905 m)   Wt 117.6 kg (259 lb 4.2 oz)   SpO2 98%   BMI 32.41 kg/m²      Assessment:       1. Centrilobular emphysema    2. Anxiety    3. Depressive disorder    4. Chronic pain syndrome    5. Hypothyroidism, unspecified type    6. Prediabetes    7. Mixed hyperlipidemia    8. Cigarette nicotine dependence without complication    9. Type 2 diabetes mellitus with diabetic polyneuropathy, without long-term current use of insulin    10. Chest pain, unspecified type    11. Moderate episode of recurrent major depressive disorder    12. Chronic right shoulder pain    13. Prostate cancer screening    14. Encounter for screening for human immunodeficiency virus (HIV)          Plan:       1. Centrilobular emphysema  -     albuterol (PROAIR HFA) 90 mcg/actuation inhaler; Inhale 2 puffs into the lungs every 6 (six) hours as needed for Wheezing. Rescue  Dispense: 17 g; Refill: 11  -     CBC auto differential; Future; Expected date: 03/30/2023   - Oxygen at night at PRN   - Patient counseled on smoking cessation- Referral placed to tobacco cessation program.     2. Anxiety  -     busPIRone (BUSPAR) 15 MG tablet; Take 1 tablet (15 mg total) by mouth 2 (two) times daily.  Dispense: 180 tablet; Refill: 3  -     DULoxetine (CYMBALTA) 30 MG capsule; Take " 1 capsule (30 mg total) by mouth once daily.  Dispense: 90 capsule; Refill: 3   - Stable-Continue Buspar and Cymbalta   - Continue current plan of care    3. Depressive disorder/Moderate episode of recurrent major depressive disorder  -     DULoxetine (CYMBALTA) 30 MG capsule; Take 1 capsule (30 mg total) by mouth once daily.  Dispense: 90 capsule; Refill: 3   - Stable-Continue Cymbalta   - Continue current plan of care    4. Chronic pain syndrome  -     DULoxetine (CYMBALTA) 30 MG capsule; Take 1 capsule (30 mg total) by mouth once daily.  Dispense: 90 capsule; Refill: 3  -     Ambulatory referral/consult to Pain Clinic; Future; Expected date: 04/06/2023    5. Hypothyroidism, unspecified type   - TSH   - Free T4   - Will order Synthroid when labs are resulted    6. Mixed hyperlipidemia  -     rosuvastatin (CRESTOR) 20 MG tablet; Take 1 tablet (20 mg total) by mouth once daily.  Dispense: 90 tablet; Refill: 3  -     Comprehensive metabolic panel; Future; Expected date: 03/30/2023  -     Lipid panel; Future; Expected date: 03/30/2023   - Stable-Continue Crestor   - Low fat, low cholesterol diet   - Continue current plan of care    7. Cigarette nicotine dependence without complication  -     Ambulatory referral/consult to Smoking Cessation Program; Future; Expected date: 04/06/2023    8. Type 2 diabetes mellitus with diabetic polyneuropathy, without long-term current use of insulin  -     Hemoglobin A1C; Future; Expected date: 03/30/2023  -     Microalbumin/Creatinine Ratio, Urine; Future; Expected date: 03/30/2023  -     metFORMIN (GLUCOPHAGE) 1000 MG tablet; Take 1 tablet (1,000 mg total) by mouth once daily.  Dispense: 90 tablet; Refill: 3   - Stable-Continue Metformin   - Diabetic Diet   - Continue current plan of care    9. Chest pain, unspecified type  -     Ambulatory referral/consult to Cardiology; Future; Expected date: 04/06/2023  -     IN OFFICE EKG 12-LEAD (to Muse)   - EKG SB- personally reviewed by me.  No signs of ischemia.     10. Chronic right shoulder pain  -     Ambulatory referral/consult to Orthopedics; Future; Expected date: 04/06/2023    11. Encounter for screening for human immunodeficiency virus (HIV)  -     HIV 1/2 Ag/Ab (4th Gen); Future; Expected date: 03/30/2023     In excessive of 40 minutes total time spent for evaluation and management services. Time included elements of the following: time spent preparing to see patient, obtaining and reviewing separately obtained history, exam, evaluation, counseling and education of patient/family member or care giver, documenting in the HMR, independently interpreting results and communication of results, coordination of care ordering medications, tests, or procedures, referral and communication to other health care professionals.     Follow up in about 1 month (around 4/30/2023), or if symptoms worsen or fail to improve.      Future Appointments       Date Provider Specialty Appt Notes    3/30/2023  Lab lab    3/31/2023 Juan John MD Orthopedics M25.511, G89.29]    5/3/2023 Michelle Patrick NP Family Medicine 1 month follow up  1month follow up    5/10/2023 Darren Reinoso MD Cardiology R07.9]

## 2023-03-30 NOTE — TELEPHONE ENCOUNTER
Pt states didn't get the gabapentin sent in or voltaren gel. Stated talked about 800mg but pharmacy didn't get those

## 2023-03-30 NOTE — TELEPHONE ENCOUNTER
----- Message from Freddy Navarro sent at 3/30/2023 11:42 AM CDT -----  Regarding: Return Call  Contact: Patient  Type:  Patient Returning Call    Who Called:Patient  Who Left Message for Patient:office staff please call patient  Does the patient know what this is regarding?:unknown  Would the patient rather a call back or a response via [facility]? call  Best Call Back Number:[phone]  Additional Information: Please call patient.

## 2023-03-30 NOTE — PATIENT INSTRUCTIONS

## 2023-03-30 NOTE — TELEPHONE ENCOUNTER
----- Message from Freddy Navarro sent at 3/30/2023 11:42 AM CDT -----  Regarding: Return Call  Contact: Patient  Type:  Patient Returning Call    Who Called:Patient  Who Left Message for Patient:office staff please call patient  Does the patient know what this is regarding?:unknown  Would the patient rather a call back or a response via [facility]? call  Best Call Back Number:[phone]  Additional Information: Please call patient.

## 2023-03-31 ENCOUNTER — TELEPHONE (OUTPATIENT)
Dept: ORTHOPEDICS | Facility: CLINIC | Age: 48
End: 2023-03-31

## 2023-03-31 ENCOUNTER — TELEPHONE (OUTPATIENT)
Dept: FAMILY MEDICINE | Facility: CLINIC | Age: 48
End: 2023-03-31
Payer: MEDICARE

## 2023-03-31 ENCOUNTER — OFFICE VISIT (OUTPATIENT)
Dept: ORTHOPEDICS | Facility: CLINIC | Age: 48
End: 2023-03-31
Payer: MEDICARE

## 2023-03-31 DIAGNOSIS — M25.511 CHRONIC RIGHT SHOULDER PAIN: Primary | ICD-10-CM

## 2023-03-31 DIAGNOSIS — G89.29 CHRONIC RIGHT SHOULDER PAIN: ICD-10-CM

## 2023-03-31 DIAGNOSIS — G89.29 CHRONIC RIGHT SHOULDER PAIN: Primary | ICD-10-CM

## 2023-03-31 DIAGNOSIS — R53.1 WEAKNESS: ICD-10-CM

## 2023-03-31 DIAGNOSIS — M25.511 CHRONIC RIGHT SHOULDER PAIN: ICD-10-CM

## 2023-03-31 LAB — HCV AB SERPL QL IA: NORMAL

## 2023-03-31 PROCEDURE — 99204 PR OFFICE/OUTPT VISIT, NEW, LEVL IV, 45-59 MIN: ICD-10-PCS | Mod: S$GLB,,, | Performed by: ORTHOPAEDIC SURGERY

## 2023-03-31 PROCEDURE — 1160F RVW MEDS BY RX/DR IN RCRD: CPT | Mod: CPTII,S$GLB,, | Performed by: ORTHOPAEDIC SURGERY

## 2023-03-31 PROCEDURE — 1160F PR REVIEW ALL MEDS BY PRESCRIBER/CLIN PHARMACIST DOCUMENTED: ICD-10-PCS | Mod: CPTII,S$GLB,, | Performed by: ORTHOPAEDIC SURGERY

## 2023-03-31 PROCEDURE — 1159F PR MEDICATION LIST DOCUMENTED IN MEDICAL RECORD: ICD-10-PCS | Mod: CPTII,S$GLB,, | Performed by: ORTHOPAEDIC SURGERY

## 2023-03-31 PROCEDURE — 3044F PR MOST RECENT HEMOGLOBIN A1C LEVEL <7.0%: ICD-10-PCS | Mod: CPTII,S$GLB,, | Performed by: ORTHOPAEDIC SURGERY

## 2023-03-31 PROCEDURE — 99999 PR PBB SHADOW E&M-EST. PATIENT-LVL III: ICD-10-PCS | Mod: PBBFAC,,, | Performed by: ORTHOPAEDIC SURGERY

## 2023-03-31 PROCEDURE — 3044F HG A1C LEVEL LT 7.0%: CPT | Mod: CPTII,S$GLB,, | Performed by: ORTHOPAEDIC SURGERY

## 2023-03-31 PROCEDURE — 99204 OFFICE O/P NEW MOD 45 MIN: CPT | Mod: S$GLB,,, | Performed by: ORTHOPAEDIC SURGERY

## 2023-03-31 PROCEDURE — 3060F POS MICROALBUMINURIA REV: CPT | Mod: CPTII,S$GLB,, | Performed by: ORTHOPAEDIC SURGERY

## 2023-03-31 PROCEDURE — 99999 PR PBB SHADOW E&M-EST. PATIENT-LVL III: CPT | Mod: PBBFAC,,, | Performed by: ORTHOPAEDIC SURGERY

## 2023-03-31 PROCEDURE — 3060F PR POS MICROALBUMINURIA RESULT DOCUMENTED/REVIEW: ICD-10-PCS | Mod: CPTII,S$GLB,, | Performed by: ORTHOPAEDIC SURGERY

## 2023-03-31 PROCEDURE — 3066F NEPHROPATHY DOC TX: CPT | Mod: CPTII,S$GLB,, | Performed by: ORTHOPAEDIC SURGERY

## 2023-03-31 PROCEDURE — 1159F MED LIST DOCD IN RCRD: CPT | Mod: CPTII,S$GLB,, | Performed by: ORTHOPAEDIC SURGERY

## 2023-03-31 PROCEDURE — 3066F PR DOCUMENTATION OF TREATMENT FOR NEPHROPATHY: ICD-10-PCS | Mod: CPTII,S$GLB,, | Performed by: ORTHOPAEDIC SURGERY

## 2023-03-31 RX ORDER — MELOXICAM 15 MG/1
15 TABLET ORAL DAILY
Qty: 30 TABLET | Refills: 1 | Status: SHIPPED | OUTPATIENT
Start: 2023-03-31

## 2023-03-31 NOTE — TELEPHONE ENCOUNTER
----- Message from Michelle Patrick NP sent at 3/31/2023  1:49 PM CDT -----  Please contact the patient and let them know the following:  Hemoglobin A1C- normal  Electrolytes, kidney and liver function normal.   Blood counts normal.   Total cholesterol is normal but your triglycerides are elevated. I recommend a heart healthy diet rich in fiber, fresh vegetables and fruit and low in saturated fats (fried foods, red meat, etc.).  I also recommend regular exercise.   TSH was slightly elevated, we will repeat this in 1-2 months.   Thanks,   Michelle

## 2023-03-31 NOTE — PROGRESS NOTES
Please contact the patient and let them know the following:  Hemoglobin A1C- normal  Electrolytes, kidney and liver function normal.   Blood counts normal.   Total cholesterol is normal but your triglycerides are elevated. I recommend a heart healthy diet rich in fiber, fresh vegetables and fruit and low in saturated fats (fried foods, red meat, etc.).  I also recommend regular exercise.   TSH was slightly elevated, we will repeat this in 1-2 months.   Thanks,   Michelle

## 2023-03-31 NOTE — TELEPHONE ENCOUNTER
----- Message from Braxton Pabon sent at 3/31/2023  4:23 PM CDT -----  Regarding: wife want to know if Dr REAVES is trying to kill her  with his Rx call wife Liz CARDENAS   Contact: wife Liz  [phone]  wife want to know if Dr REAVES is trying to kill her  with his Rx call wife Liz CARDENAS

## 2023-03-31 NOTE — TELEPHONE ENCOUNTER
States wanting 2nd opinion not satisfied with orthopedic he seen today. Asking for another referral .

## 2023-03-31 NOTE — PROGRESS NOTES
Subjective:      Patient ID: Michael Alonzo Sr. is a 47 y.o. male.    Chief Complaint: Pain of the Right Shoulder    HPI  47-year-old right-hand dominant male with a long history of intermittent problems with his right shoulder.  He sustained trauma about quite a few years ago and told that he would likely have long-term problems with his shoulder.  He is had several years of somewhat progressive symptoms worse over the last 4-5 months.  Recently had some therapeutic at [facility]  in the [place] and states that his shoulders worsened ever at this point.  He is complaining of pain stiffness weakness has neck pain intermittent pain that radiates down his arm.  ROS      Objective:    Ortho Exam     Constitutional:   Patient is alert  and oriented in no acute distress  HEENT:  normocephalic atraumatic; PERRL EOMI  Neck:  Supple without adenopathy  Cardiovascular:  Normal rate and rhythm  Pulmonary:  Normal respiratory effort normal chest wall expansion  Abdominal:  Nonprotuberant nondistended  Musculoskeletal:  Patient has a steady nonantalgic gait.  Neck is supple with adequate range of motion  No Lhermitte's or Spurling sign.  Limited active range of motion of the right shoulder with discomfort with full forward flexion and internal rotation.  There is a mildly positive impingement sign.  Negative Yergason's and Speed's testing.  Mild weakness in external rotation positive pain with strength testing against resistance  He has obvious atrophy around the right shoulder girdle.  There is a normal distal neurologic and vascular examination.  Neurological:  No focal defect; cranial nerves 2-12 grossly intact  Psychiatric/behavioral:  Mood and behavior normal       My radiographs Findings:    Radiographs of the right shoulder in the outpatient setting were reviewed without any acute osseous abnormalities  Assessment:       Encounter Diagnosis   Name Primary?    Chronic right shoulder pain           Plan:       I have discussed medical condition and treatment options with him at length.  I have suggested some physical therapy for functional range of motion stretching strengthening.  I have suggested a trial of Mobic GI cardiac and renal precautions were discussed.  Due to his pain weakness atrophy and radicular-type symptoms I have suggested nerve studies he may require workup of the cervical spine as he does note some intermittent neck pain.  Follow up after nerve study sooner if        Past Medical History:   Diagnosis Date    Arthritis     Asthma     Cellulitis of left upper extremity 3/27/2022    Diabetes mellitus     High cholesterol     Stroke     TIA    Thyroid disease      Past Surgical History:   Procedure Laterality Date    OTHER SURGICAL HISTORY      left hand    SPINAL FIXATION SURGERY           Current Outpatient Medications:     albuterol (PROAIR HFA) 90 mcg/actuation inhaler, Inhale 2 puffs into the lungs every 6 (six) hours as needed for Wheezing. Rescue, Disp: 17 g, Rfl: 11    aspirin (ECOTRIN) 81 MG EC tablet, Take 1 tablet by mouth once daily., Disp: , Rfl:     busPIRone (BUSPAR) 15 MG tablet, Take 1 tablet (15 mg total) by mouth 2 (two) times daily., Disp: 180 tablet, Rfl: 3    diclofenac sodium (VOLTAREN) 1 % Gel, Apply topically 4 (four) times daily., Disp: 350 g, Rfl: 2    docusate sodium (COLACE) 100 MG capsule, Take 1 capsule by mouth daily as needed., Disp: , Rfl:     DULoxetine (CYMBALTA) 30 MG capsule, Take 1 capsule (30 mg total) by mouth once daily., Disp: 90 capsule, Rfl: 3    gabapentin (NEURONTIN) 800 MG tablet, Take 1 tablet (800 mg total) by mouth 3 (three) times daily., Disp: 90 tablet, Rfl: 11    levothyroxine (SYNTHROID) 25 MCG tablet, TAKE ONE TABLET BY MOUTH ONCE DAILY, Disp: 30 tablet, Rfl: 3    metFORMIN (GLUCOPHAGE) 1000 MG tablet, Take 1 tablet (1,000 mg total) by mouth once daily., Disp: 90 tablet, Rfl: 3    rosuvastatin (CRESTOR) 20 MG tablet, Take 1 tablet (20 mg  total) by mouth once daily., Disp: 90 tablet, Rfl: 3    traZODone (DESYREL) 50 MG tablet, , Disp: 30 tablet, Rfl: 3    Review of patient's allergies indicates:   Allergen Reactions    Amitriptyline Anaphylaxis and Itching    Decadron [dexamethasone sodium phosphate] Anaphylaxis    Paroxetine Anaphylaxis    Tramadol Anaphylaxis    Tomato (solanum lycopersicum) Hives       Family History   Problem Relation Age of Onset    Stroke Mother     Diabetes Father     Brain cancer Paternal Grandmother     Lung cancer Paternal Grandfather      Social History     Occupational History    Not on file   Tobacco Use    Smoking status: Every Day     Packs/day: 0.50     Types: Cigarettes    Smokeless tobacco: Never   Substance and Sexual Activity    Alcohol use: No    Drug use: No    Sexual activity: Yes     Partners: Female

## 2023-04-03 ENCOUNTER — TELEPHONE (OUTPATIENT)
Dept: PHYSICAL MEDICINE AND REHAB | Facility: CLINIC | Age: 48
End: 2023-04-03
Payer: MEDICARE

## 2023-04-03 ENCOUNTER — PATIENT MESSAGE (OUTPATIENT)
Dept: ADMINISTRATIVE | Facility: HOSPITAL | Age: 48
End: 2023-04-03
Payer: MEDICARE

## 2023-04-03 NOTE — TELEPHONE ENCOUNTER
Asked if patient wanted to schedule EMG/ Nerve conduction test with Dr. Levine in [place]. Pt stated he did and appointment was made.

## 2023-04-04 NOTE — TELEPHONE ENCOUNTER
Attempted to reach the patient regarding 2nd opinion with orthopedics. Left message with details and number to contact to schedule that for him.

## 2023-04-12 ENCOUNTER — PATIENT MESSAGE (OUTPATIENT)
Dept: ADMINISTRATIVE | Facility: HOSPITAL | Age: 48
End: 2023-04-12
Payer: MEDICARE

## 2023-05-11 ENCOUNTER — TELEPHONE (OUTPATIENT)
Dept: PHYSICAL MEDICINE AND REHAB | Facility: CLINIC | Age: 48
End: 2023-05-11
Payer: MEDICARE

## 2023-05-30 ENCOUNTER — PATIENT MESSAGE (OUTPATIENT)
Dept: ADMINISTRATIVE | Facility: HOSPITAL | Age: 48
End: 2023-05-30
Payer: MEDICARE

## 2023-05-31 ENCOUNTER — TELEPHONE (OUTPATIENT)
Dept: NEUROLOGY | Facility: CLINIC | Age: 48
End: 2023-05-31
Payer: MEDICARE

## 2023-05-31 NOTE — TELEPHONE ENCOUNTER
JYAMIE [address] [place] [address] to schedule EMG study.  Spoke with Mrs. Alonzo informed her what the call was in reference to and that a voice mail has been left on the patient phone to schedule test.  She will relate the message but at this time they are in the hospital with the father-in-law with a CVA.

## 2023-07-11 ENCOUNTER — PATIENT MESSAGE (OUTPATIENT)
Dept: ADMINISTRATIVE | Facility: HOSPITAL | Age: 48
End: 2023-07-11
Payer: MEDICARE

## 2023-10-11 DIAGNOSIS — E11.9 TYPE 2 DIABETES MELLITUS WITHOUT COMPLICATION: ICD-10-CM

## 2023-12-17 DIAGNOSIS — F41.9 ANXIETY: ICD-10-CM

## 2023-12-17 DIAGNOSIS — F32.A DEPRESSIVE DISORDER: ICD-10-CM

## 2023-12-17 DIAGNOSIS — G89.4 CHRONIC PAIN SYNDROME: ICD-10-CM

## 2023-12-18 RX ORDER — BUSPIRONE HYDROCHLORIDE 15 MG/1
15 TABLET ORAL 2 TIMES DAILY
Qty: 180 TABLET | Refills: 0 | Status: SHIPPED | OUTPATIENT
Start: 2023-12-18

## 2023-12-18 RX ORDER — DULOXETIN HYDROCHLORIDE 30 MG/1
30 CAPSULE, DELAYED RELEASE ORAL
Qty: 90 CAPSULE | Refills: 0 | Status: SHIPPED | OUTPATIENT
Start: 2023-12-18

## 2024-01-02 ENCOUNTER — PATIENT MESSAGE (OUTPATIENT)
Dept: ADMINISTRATIVE | Facility: HOSPITAL | Age: 49
End: 2024-01-02
Payer: MEDICARE

## 2024-02-06 DIAGNOSIS — Z12.11 COLON CANCER SCREENING: ICD-10-CM

## 2024-02-25 ENCOUNTER — HOSPITAL ENCOUNTER (OUTPATIENT)
Facility: HOSPITAL | Age: 49
Discharge: LEFT BEFORE DISCHARGE | End: 2024-02-25
Attending: EMERGENCY MEDICINE | Admitting: STUDENT IN AN ORGANIZED HEALTH CARE EDUCATION/TRAINING PROGRAM
Payer: MEDICARE

## 2024-02-25 VITALS
OXYGEN SATURATION: 97 % | HEART RATE: 85 BPM | DIASTOLIC BLOOD PRESSURE: 71 MMHG | TEMPERATURE: 98 F | BODY MASS INDEX: 32.2 KG/M2 | HEIGHT: 75 IN | SYSTOLIC BLOOD PRESSURE: 136 MMHG | RESPIRATION RATE: 20 BRPM | WEIGHT: 259 LBS

## 2024-02-25 DIAGNOSIS — R07.9 CHEST PAIN: ICD-10-CM

## 2024-02-25 DIAGNOSIS — L03.114 CELLULITIS OF LEFT UPPER EXTREMITY: ICD-10-CM

## 2024-02-25 DIAGNOSIS — R55 SYNCOPE: ICD-10-CM

## 2024-02-25 DIAGNOSIS — R55 SYNCOPE, UNSPECIFIED SYNCOPE TYPE: Primary | ICD-10-CM

## 2024-02-25 PROBLEM — F17.210 DEPENDENCE ON NICOTINE FROM CIGARETTES: Status: ACTIVE | Noted: 2017-07-20

## 2024-02-25 LAB
ANION GAP SERPL CALC-SCNC: 11 MMOL/L (ref 8–16)
BASOPHILS # BLD AUTO: 0.06 K/UL (ref 0–0.2)
BASOPHILS NFR BLD: 0.7 % (ref 0–1.9)
BUN SERPL-MCNC: 7 MG/DL (ref 6–20)
CALCIUM SERPL-MCNC: 9.1 MG/DL (ref 8.7–10.5)
CHLORIDE SERPL-SCNC: 102 MMOL/L (ref 95–110)
CO2 SERPL-SCNC: 24 MMOL/L (ref 23–29)
CREAT SERPL-MCNC: 0.8 MG/DL (ref 0.5–1.4)
CRP SERPL-MCNC: 121.5 MG/L (ref 0–8.2)
D DIMER PPP IA.FEU-MCNC: 0.43 MG/L FEU
DIFFERENTIAL METHOD BLD: ABNORMAL
EOSINOPHIL # BLD AUTO: 0.1 K/UL (ref 0–0.5)
EOSINOPHIL NFR BLD: 1.4 % (ref 0–8)
ERYTHROCYTE [DISTWIDTH] IN BLOOD BY AUTOMATED COUNT: 12.1 % (ref 11.5–14.5)
EST. GFR  (NO RACE VARIABLE): >60 ML/MIN/1.73 M^2
GLUCOSE SERPL-MCNC: 98 MG/DL (ref 70–110)
HCT VFR BLD AUTO: 35.4 % (ref 40–54)
HGB BLD-MCNC: 12.2 G/DL (ref 14–18)
IMM GRANULOCYTES # BLD AUTO: 0.02 K/UL (ref 0–0.04)
IMM GRANULOCYTES NFR BLD AUTO: 0.2 % (ref 0–0.5)
LACTATE SERPL-SCNC: 0.9 MMOL/L (ref 0.5–2.2)
LYMPHOCYTES # BLD AUTO: 2.4 K/UL (ref 1–4.8)
LYMPHOCYTES NFR BLD: 27.8 % (ref 18–48)
MCH RBC QN AUTO: 29 PG (ref 27–31)
MCHC RBC AUTO-ENTMCNC: 34.5 G/DL (ref 32–36)
MCV RBC AUTO: 84 FL (ref 82–98)
MONOCYTES # BLD AUTO: 0.8 K/UL (ref 0.3–1)
MONOCYTES NFR BLD: 8.8 % (ref 4–15)
NEUTROPHILS # BLD AUTO: 5.3 K/UL (ref 1.8–7.7)
NEUTROPHILS NFR BLD: 61.1 % (ref 38–73)
NRBC BLD-RTO: 0 /100 WBC
PLATELET # BLD AUTO: 207 K/UL (ref 150–450)
PMV BLD AUTO: 9.8 FL (ref 9.2–12.9)
POTASSIUM SERPL-SCNC: 3.8 MMOL/L (ref 3.5–5.1)
RBC # BLD AUTO: 4.2 M/UL (ref 4.6–6.2)
SODIUM SERPL-SCNC: 137 MMOL/L (ref 136–145)
TROPONIN I SERPL DL<=0.01 NG/ML-MCNC: <0.006 NG/ML (ref 0–0.03)
TSH SERPL DL<=0.005 MIU/L-ACNC: 2.12 UIU/ML (ref 0.4–4)
WBC # BLD AUTO: 8.68 K/UL (ref 3.9–12.7)

## 2024-02-25 PROCEDURE — 25000003 PHARM REV CODE 250: Performed by: NURSE PRACTITIONER

## 2024-02-25 PROCEDURE — 85025 COMPLETE CBC W/AUTO DIFF WBC: CPT | Performed by: EMERGENCY MEDICINE

## 2024-02-25 PROCEDURE — 93010 ELECTROCARDIOGRAM REPORT: CPT | Mod: ,,, | Performed by: GENERAL PRACTICE

## 2024-02-25 PROCEDURE — 80048 BASIC METABOLIC PNL TOTAL CA: CPT | Performed by: EMERGENCY MEDICINE

## 2024-02-25 PROCEDURE — 99285 EMERGENCY DEPT VISIT HI MDM: CPT | Mod: 25

## 2024-02-25 PROCEDURE — 96361 HYDRATE IV INFUSION ADD-ON: CPT

## 2024-02-25 PROCEDURE — 25000003 PHARM REV CODE 250: Performed by: EMERGENCY MEDICINE

## 2024-02-25 PROCEDURE — 94640 AIRWAY INHALATION TREATMENT: CPT

## 2024-02-25 PROCEDURE — 83605 ASSAY OF LACTIC ACID: CPT | Performed by: EMERGENCY MEDICINE

## 2024-02-25 PROCEDURE — 96372 THER/PROPH/DIAG INJ SC/IM: CPT | Mod: 59 | Performed by: NURSE PRACTITIONER

## 2024-02-25 PROCEDURE — 36415 COLL VENOUS BLD VENIPUNCTURE: CPT | Performed by: NURSE PRACTITIONER

## 2024-02-25 PROCEDURE — 84484 ASSAY OF TROPONIN QUANT: CPT | Performed by: EMERGENCY MEDICINE

## 2024-02-25 PROCEDURE — 63600175 PHARM REV CODE 636 W HCPCS: Mod: JZ,JG | Performed by: EMERGENCY MEDICINE

## 2024-02-25 PROCEDURE — 63600175 PHARM REV CODE 636 W HCPCS: Performed by: NURSE PRACTITIONER

## 2024-02-25 PROCEDURE — S4991 NICOTINE PATCH NONLEGEND: HCPCS | Performed by: NURSE PRACTITIONER

## 2024-02-25 PROCEDURE — 84443 ASSAY THYROID STIM HORMONE: CPT | Performed by: NURSE PRACTITIONER

## 2024-02-25 PROCEDURE — G0378 HOSPITAL OBSERVATION PER HR: HCPCS

## 2024-02-25 PROCEDURE — 85379 FIBRIN DEGRADATION QUANT: CPT | Performed by: NURSE PRACTITIONER

## 2024-02-25 PROCEDURE — 96375 TX/PRO/DX INJ NEW DRUG ADDON: CPT

## 2024-02-25 PROCEDURE — 96365 THER/PROPH/DIAG IV INF INIT: CPT

## 2024-02-25 PROCEDURE — 94761 N-INVAS EAR/PLS OXIMETRY MLT: CPT

## 2024-02-25 PROCEDURE — 36415 COLL VENOUS BLD VENIPUNCTURE: CPT | Performed by: EMERGENCY MEDICINE

## 2024-02-25 PROCEDURE — 86140 C-REACTIVE PROTEIN: CPT | Performed by: NURSE PRACTITIONER

## 2024-02-25 PROCEDURE — 93005 ELECTROCARDIOGRAM TRACING: CPT

## 2024-02-25 PROCEDURE — 25000242 PHARM REV CODE 250 ALT 637 W/ HCPCS: Performed by: NURSE PRACTITIONER

## 2024-02-25 RX ORDER — DULOXETIN HYDROCHLORIDE 30 MG/1
30 CAPSULE, DELAYED RELEASE ORAL DAILY
Status: DISCONTINUED | OUTPATIENT
Start: 2024-02-26 | End: 2024-02-26 | Stop reason: HOSPADM

## 2024-02-25 RX ORDER — IBUPROFEN 200 MG
1 TABLET ORAL DAILY
Status: DISCONTINUED | OUTPATIENT
Start: 2024-02-25 | End: 2024-02-26 | Stop reason: HOSPADM

## 2024-02-25 RX ORDER — IPRATROPIUM BROMIDE AND ALBUTEROL SULFATE 2.5; .5 MG/3ML; MG/3ML
3 SOLUTION RESPIRATORY (INHALATION) EVERY 4 HOURS PRN
Status: DISCONTINUED | OUTPATIENT
Start: 2024-02-25 | End: 2024-02-26 | Stop reason: HOSPADM

## 2024-02-25 RX ORDER — GLUCAGON 1 MG
1 KIT INJECTION
Status: DISCONTINUED | OUTPATIENT
Start: 2024-02-25 | End: 2024-02-26 | Stop reason: HOSPADM

## 2024-02-25 RX ORDER — OXYCODONE AND ACETAMINOPHEN 5; 325 MG/1; MG/1
2 TABLET ORAL EVERY 6 HOURS PRN
Status: DISCONTINUED | OUTPATIENT
Start: 2024-02-25 | End: 2024-02-26 | Stop reason: HOSPADM

## 2024-02-25 RX ORDER — TALC
6 POWDER (GRAM) TOPICAL NIGHTLY PRN
Status: DISCONTINUED | OUTPATIENT
Start: 2024-02-25 | End: 2024-02-26 | Stop reason: HOSPADM

## 2024-02-25 RX ORDER — SODIUM,POTASSIUM PHOSPHATES 280-250MG
2 POWDER IN PACKET (EA) ORAL
Status: DISCONTINUED | OUTPATIENT
Start: 2024-02-25 | End: 2024-02-26 | Stop reason: HOSPADM

## 2024-02-25 RX ORDER — IBUPROFEN 200 MG
16 TABLET ORAL
Status: DISCONTINUED | OUTPATIENT
Start: 2024-02-25 | End: 2024-02-26 | Stop reason: HOSPADM

## 2024-02-25 RX ORDER — TRAZODONE HYDROCHLORIDE 50 MG/1
50 TABLET ORAL NIGHTLY PRN
Status: DISCONTINUED | OUTPATIENT
Start: 2024-02-25 | End: 2024-02-26 | Stop reason: HOSPADM

## 2024-02-25 RX ORDER — ACETAMINOPHEN 325 MG/1
650 TABLET ORAL EVERY 8 HOURS PRN
Status: DISCONTINUED | OUTPATIENT
Start: 2024-02-25 | End: 2024-02-26 | Stop reason: HOSPADM

## 2024-02-25 RX ORDER — DOCUSATE SODIUM 100 MG/1
100 CAPSULE, LIQUID FILLED ORAL DAILY
Status: DISCONTINUED | OUTPATIENT
Start: 2024-02-26 | End: 2024-02-26 | Stop reason: HOSPADM

## 2024-02-25 RX ORDER — SODIUM CHLORIDE 0.9 % (FLUSH) 0.9 %
10 SYRINGE (ML) INJECTION EVERY 12 HOURS PRN
Status: DISCONTINUED | OUTPATIENT
Start: 2024-02-25 | End: 2024-02-26 | Stop reason: HOSPADM

## 2024-02-25 RX ORDER — ASPIRIN 81 MG/1
81 TABLET ORAL DAILY
Status: DISCONTINUED | OUTPATIENT
Start: 2024-02-26 | End: 2024-02-26 | Stop reason: HOSPADM

## 2024-02-25 RX ORDER — LEVOTHYROXINE SODIUM 25 UG/1
25 TABLET ORAL
Status: DISCONTINUED | OUTPATIENT
Start: 2024-02-26 | End: 2024-02-26 | Stop reason: HOSPADM

## 2024-02-25 RX ORDER — ONDANSETRON HYDROCHLORIDE 2 MG/ML
4 INJECTION, SOLUTION INTRAVENOUS EVERY 8 HOURS PRN
Status: DISCONTINUED | OUTPATIENT
Start: 2024-02-25 | End: 2024-02-26 | Stop reason: HOSPADM

## 2024-02-25 RX ORDER — ENOXAPARIN SODIUM 100 MG/ML
40 INJECTION SUBCUTANEOUS EVERY 24 HOURS
Status: DISCONTINUED | OUTPATIENT
Start: 2024-02-25 | End: 2024-02-26 | Stop reason: HOSPADM

## 2024-02-25 RX ORDER — LANOLIN ALCOHOL/MO/W.PET/CERES
800 CREAM (GRAM) TOPICAL
Status: DISCONTINUED | OUTPATIENT
Start: 2024-02-25 | End: 2024-02-26 | Stop reason: HOSPADM

## 2024-02-25 RX ORDER — IBUPROFEN 200 MG
24 TABLET ORAL
Status: DISCONTINUED | OUTPATIENT
Start: 2024-02-25 | End: 2024-02-26 | Stop reason: HOSPADM

## 2024-02-25 RX ORDER — MORPHINE SULFATE 4 MG/ML
8 INJECTION, SOLUTION INTRAMUSCULAR; INTRAVENOUS
Status: COMPLETED | OUTPATIENT
Start: 2024-02-25 | End: 2024-02-25

## 2024-02-25 RX ORDER — ONDANSETRON HYDROCHLORIDE 2 MG/ML
4 INJECTION, SOLUTION INTRAVENOUS
Status: COMPLETED | OUTPATIENT
Start: 2024-02-25 | End: 2024-02-25

## 2024-02-25 RX ORDER — SODIUM CHLORIDE 9 MG/ML
INJECTION, SOLUTION INTRAVENOUS CONTINUOUS
Status: DISCONTINUED | OUTPATIENT
Start: 2024-02-25 | End: 2024-02-26 | Stop reason: HOSPADM

## 2024-02-25 RX ORDER — SIMETHICONE 80 MG
1 TABLET,CHEWABLE ORAL 4 TIMES DAILY PRN
Status: DISCONTINUED | OUTPATIENT
Start: 2024-02-25 | End: 2024-02-26 | Stop reason: HOSPADM

## 2024-02-25 RX ORDER — ALUMINUM HYDROXIDE, MAGNESIUM HYDROXIDE, AND SIMETHICONE 1200; 120; 1200 MG/30ML; MG/30ML; MG/30ML
30 SUSPENSION ORAL 4 TIMES DAILY PRN
Status: DISCONTINUED | OUTPATIENT
Start: 2024-02-25 | End: 2024-02-26 | Stop reason: HOSPADM

## 2024-02-25 RX ORDER — NALOXONE HCL 0.4 MG/ML
0.02 VIAL (ML) INJECTION
Status: DISCONTINUED | OUTPATIENT
Start: 2024-02-25 | End: 2024-02-26 | Stop reason: HOSPADM

## 2024-02-25 RX ORDER — OXYCODONE AND ACETAMINOPHEN 5; 325 MG/1; MG/1
1 TABLET ORAL EVERY 6 HOURS PRN
Status: DISCONTINUED | OUTPATIENT
Start: 2024-02-25 | End: 2024-02-26 | Stop reason: HOSPADM

## 2024-02-25 RX ORDER — BUSPIRONE HYDROCHLORIDE 5 MG/1
15 TABLET ORAL 2 TIMES DAILY
Status: DISCONTINUED | OUTPATIENT
Start: 2024-02-25 | End: 2024-02-26 | Stop reason: HOSPADM

## 2024-02-25 RX ORDER — ATORVASTATIN CALCIUM 40 MG/1
80 TABLET, FILM COATED ORAL DAILY
Status: DISCONTINUED | OUTPATIENT
Start: 2024-02-26 | End: 2024-02-26 | Stop reason: HOSPADM

## 2024-02-25 RX ORDER — INSULIN ASPART 100 [IU]/ML
1-10 INJECTION, SOLUTION INTRAVENOUS; SUBCUTANEOUS
Status: DISCONTINUED | OUTPATIENT
Start: 2024-02-25 | End: 2024-02-26 | Stop reason: HOSPADM

## 2024-02-25 RX ADMIN — Medication 1 PATCH: at 09:02

## 2024-02-25 RX ADMIN — VANCOMYCIN HYDROCHLORIDE 1750 MG: 750 INJECTION, POWDER, LYOPHILIZED, FOR SOLUTION INTRAVENOUS at 09:02

## 2024-02-25 RX ADMIN — ENOXAPARIN SODIUM 40 MG: 40 INJECTION SUBCUTANEOUS at 09:02

## 2024-02-25 RX ADMIN — MORPHINE SULFATE 8 MG: 4 INJECTION, SOLUTION INTRAMUSCULAR; INTRAVENOUS at 06:02

## 2024-02-25 RX ADMIN — OXYCODONE HYDROCHLORIDE AND ACETAMINOPHEN 2 TABLET: 5; 325 TABLET ORAL at 09:02

## 2024-02-25 RX ADMIN — CEFTRIAXONE 2 G: 2 INJECTION, POWDER, FOR SOLUTION INTRAMUSCULAR; INTRAVENOUS at 09:02

## 2024-02-25 RX ADMIN — SODIUM CHLORIDE 1000 ML: 9 INJECTION, SOLUTION INTRAVENOUS at 06:02

## 2024-02-25 RX ADMIN — IPRATROPIUM BROMIDE AND ALBUTEROL SULFATE 3 ML: .5; 2.5 SOLUTION RESPIRATORY (INHALATION) at 09:02

## 2024-02-25 RX ADMIN — BUSPIRONE HYDROCHLORIDE 15 MG: 5 TABLET ORAL at 09:02

## 2024-02-25 RX ADMIN — ONDANSETRON 4 MG: 2 INJECTION INTRAMUSCULAR; INTRAVENOUS at 06:02

## 2024-02-25 NOTE — ED PROVIDER NOTES
Chief complaint:  Chest Pain (Left arm swelling , warm to touch ) and Cellulitis (Left arm/)      HPI:  Michael Alonzo . is a 48 y.o. male with hx CVA with right-sided weakness, asthma, tobacco use, NIDDM presenting with worsening redness and pain in the left upper extremity. Patient seen yesterday for swelling/redness/pain near L elbow with rx for cephalexin x 7 days and hydrocodone PRN pain.  XR L elbow shows superficial soft tissue swelling without joint effusion and a metallic FB about the radial anterior aspect.  He notably did not feel antibiotic prescribed yesterday.  He endorses fire ant bites to both upper extremities prior to onset of small area of redness yesterday that has significantly increased in size.  Pain has persistent as well and rated as moderate to severe.  It is worse with moving at the elbow.  He denies injury.  He denies salt or brackish water exposure.  No recent travel history.  No associated numbness or weakness.  Separate complaint of syncope where he walked into the store with his son and had a brief period of lightheadedness without vertigo followed by syncope.  He denies injury in the fall.  He did have sternal, nonradiating, non migrating chest pressure following the episode of syncope lasting minutes that is now resolved.  There was no associated dyspnea, diaphoresis.  There was no emesis at the time but he does endorse emesis intermittently since yesterday he associates with the pain.  It has been nonbilious and nonbloody.  There is no associated diarrhea.    ROS: As per HPI and below:  No headache, confusion, abdominal pain, back pain, measured fever.  Positive for some swelling in area of redness that has increased since yesterday.    Review of patient's allergies indicates:   Allergen Reactions    Amitriptyline Anaphylaxis and Itching    Decadron [dexamethasone sodium phosphate] Anaphylaxis    Paroxetine Anaphylaxis    Tramadol Anaphylaxis    Tomato (solanum  lycopersicum) Hives       Patient's Medications   New Prescriptions    No medications on file   Previous Medications    ALBUTEROL (PROAIR HFA) 90 MCG/ACTUATION INHALER    Inhale 2 puffs into the lungs every 6 (six) hours as needed for Wheezing. Rescue    ASPIRIN (ECOTRIN) 81 MG EC TABLET    Take 1 tablet by mouth once daily.    BUSPIRONE (BUSPAR) 15 MG TABLET    TAKE ONE TABLET BY MOUTH TWICE DAILY    DICLOFENAC SODIUM (VOLTAREN) 1 % GEL    Apply topically 4 (four) times daily.    DOCUSATE SODIUM (COLACE) 100 MG CAPSULE    Take 1 capsule by mouth daily as needed.    DULOXETINE (CYMBALTA) 30 MG CAPSULE    TAKE ONE CAPSULE BY MOUTH ONCE DAILY    GABAPENTIN (NEURONTIN) 800 MG TABLET    Take 1 tablet (800 mg total) by mouth 3 (three) times daily.    LEVOTHYROXINE (SYNTHROID) 25 MCG TABLET    TAKE ONE TABLET BY MOUTH ONCE DAILY    MELOXICAM (MOBIC) 15 MG TABLET    Take 1 tablet (15 mg total) by mouth once daily.    METFORMIN (GLUCOPHAGE) 1000 MG TABLET    Take 1 tablet (1,000 mg total) by mouth once daily.    ROSUVASTATIN (CRESTOR) 20 MG TABLET    Take 1 tablet (20 mg total) by mouth once daily.    TRAZODONE (DESYREL) 50 MG TABLET       Modified Medications    No medications on file   Discontinued Medications    No medications on file       PMH:  As per HPI and below:  Past Medical History:   Diagnosis Date    Arthritis     Asthma     Cellulitis of left upper extremity 3/27/2022    Diabetes mellitus     High cholesterol     Stroke     TIA    Thyroid disease      Past Surgical History:   Procedure Laterality Date    OTHER SURGICAL HISTORY      left hand    SPINAL FIXATION SURGERY         Social History     Socioeconomic History    Marital status:    Tobacco Use    Smoking status: Every Day     Current packs/day: 0.50     Types: Cigarettes    Smokeless tobacco: Never   Substance and Sexual Activity    Alcohol use: No    Drug use: No    Sexual activity: Yes     Partners: Female       Family History   Problem Relation  Age of Onset    Stroke Mother     Diabetes Father     Brain cancer Paternal Grandmother     Lung cancer Paternal Grandfather        Physical Exam:    Vitals:    02/25/24 1902   BP: 136/71   Pulse: 85   Resp:    Temp:      GENERAL:  Mild discomfort secondary to pain.  Alert.    HEENT:  Moist mucous membranes.  Normocephalic and atraumatic.    NECK:  No swelling.  Midline trachea.   CARDIOVASCULAR:  Regular rate and rhythm.  2+ radial pulses.  No murmur auscultated.  PULMONARY:  Lungs clear to auscultation bilaterally.  No wheezes, rales, or rhonci.  Unlabored respirations.  ABDOMEN:  Non-tender and non-distended.    EXTREMITIES:  Warm and well perfused.  Brisk capillary refill.  Left upper extremity erythema and localized soft tissue edema that is noncircumferential.  There is tenderness to palpation without crepitus from proximal to the elbow down towards the distal forearm sparing the wrist.  There is full active range of motion of the wrist without pain.  He is able to fully flex at the elbow with extension limited to around 60° by pain.  No clear joint effusion.  There is notably no tenderness along the joint on the antecubital surface apart from main body of erythema.  NEUROLOGICAL:  Normal mental status.  Appropriate and conversant.  5/5 strength and sensation in the bilateral upper extremities.    SKIN:  No rashes or ecchymoses.      Labs Reviewed   CBC W/ AUTO DIFFERENTIAL - Abnormal; Notable for the following components:       Result Value    RBC 4.20 (*)     Hemoglobin 12.2 (*)     Hematocrit 35.4 (*)     All other components within normal limits   C-REACTIVE PROTEIN - Abnormal; Notable for the following components:    .5 (*)     All other components within normal limits   BASIC METABOLIC PANEL   TROPONIN I   LACTIC ACID, PLASMA   D DIMER, QUANTITATIVE   TSH   URINALYSIS, REFLEX TO URINE CULTURE       Current Discharge Medication List        CONTINUE these medications which have NOT CHANGED     Details   albuterol (PROAIR HFA) 90 mcg/actuation inhaler Inhale 2 puffs into the lungs every 6 (six) hours as needed for Wheezing. Rescue  Qty: 17 g, Refills: 11    Associated Diagnoses: Centrilobular emphysema      aspirin (ECOTRIN) 81 MG EC tablet Take 1 tablet by mouth once daily.      busPIRone (BUSPAR) 15 MG tablet TAKE ONE TABLET BY MOUTH TWICE DAILY  Qty: 180 tablet, Refills: 0    Comments: This prescription was filled on 10/18/2023. Any refills authorized will be placed on file.  Associated Diagnoses: Anxiety      diclofenac sodium (VOLTAREN) 1 % Gel Apply topically 4 (four) times daily.  Qty: 350 g, Refills: 2    Comments: This prescription was filled on 5/7/2022. Any refills authorized will be placed on file.  Associated Diagnoses: Degeneration of lumbar intervertebral disc      docusate sodium (COLACE) 100 MG capsule Take 1 capsule by mouth daily as needed.      DULoxetine (CYMBALTA) 30 MG capsule TAKE ONE CAPSULE BY MOUTH ONCE DAILY  Qty: 90 capsule, Refills: 0    Comments: This prescription was filled on 10/18/2023. Any refills authorized will be placed on file.  Associated Diagnoses: Anxiety; Chronic pain syndrome; Depressive disorder      gabapentin (NEURONTIN) 800 MG tablet Take 1 tablet (800 mg total) by mouth 3 (three) times daily.  Qty: 90 tablet, Refills: 11    Associated Diagnoses: Type 2 diabetes mellitus with diabetic polyneuropathy, without long-term current use of insulin      levothyroxine (SYNTHROID) 25 MCG tablet TAKE ONE TABLET BY MOUTH ONCE DAILY  Qty: 30 tablet, Refills: 3    Associated Diagnoses: Hypothyroidism, unspecified type      meloxicam (MOBIC) 15 MG tablet Take 1 tablet (15 mg total) by mouth once daily.  Qty: 30 tablet, Refills: 1    Associated Diagnoses: Chronic right shoulder pain      metFORMIN (GLUCOPHAGE) 1000 MG tablet Take 1 tablet (1,000 mg total) by mouth once daily.  Qty: 90 tablet, Refills: 3    Associated Diagnoses: Type 2 diabetes mellitus with diabetic  polyneuropathy, without long-term current use of insulin      rosuvastatin (CRESTOR) 20 MG tablet Take 1 tablet (20 mg total) by mouth once daily.  Qty: 90 tablet, Refills: 3    Associated Diagnoses: Mixed hyperlipidemia      traZODone (DESYREL) 50 MG tablet Qty: 30 tablet, Refills: 3    Associated Diagnoses: Primary insomnia             Orders Placed This Encounter   Procedures    CT Head Without Contrast    X-Ray Chest AP Portable    X-Ray Elbow Complete Left    Complete Blood Count (CBC)    Basic Metabolic Panel (BMP)    Troponin I    Lactic acid, plasma    D-Dimer, Quantitative    C-Reactive Protein    TSH    Troponin I    Troponin I    VANCOMYCIN, TROUGH    Urinalysis, Reflex to Urine Culture Urine, Clean Catch    Cardiac Monitoring - Adult    Orthostatic blood pressure    Pharmacy to dose Vancomycin consult    EKG 12-lead    Insert Saline lock IV    Possible Hospitalization    Place in Observation       Imaging Results              CT Head Without Contrast (In process)                      X-Ray Elbow Complete Left (In process)  Result time 02/25/24 19:40:55   Procedure changed from X-Ray Elbow 2 Views Left                    X-Ray Chest AP Portable (In process)                     ED Course as of 02/25/24 2005   Sun Feb 25, 2024   1705 EKG:  Normal sinus rhythm at a rate of 82.  Normal intervals.  Normal axis.  No significant ST or T wave changes suggesting acute ischemia or infarction.  (Independently interpreted by me)   [MR]      ED Course User Index  [MR] Justin Vasquez MD       MDM:    48 y.o. male with left upper extremity pain consistent with worse in left upper extremity cellulitis.  He was seen yesterday but did not fill prescribed antibiotic.  Low suspicion for sepsis.  Laboratories with inflammatory markers including white blood cell count lactic acid sent for review.  I doubt septic joint at this point.  He has localized soft tissue swelling with an area of erythema that tracks down the  arm.  Lactic acid is normal I doubt sepsis.  I have very low suspicion for necrotizing fasciitis.  Morphine IV given for pain.  Separate complaints of syncope followed by chest pain noted.  Initial EKG without ischemia or arrhythmia evident.  Cardiac biomarker sent for further risk stratification.  I do plan on admission both for worsening cellulitis as well as syncopal episode with resolved chest pain.  Very low suspicion for PE or DVT.  I do not think ultrasound or CTA of the chest are indicated.  Other lab sent for assessment such as exclusion of metabolic derangements such as DKA or associated metabolic derangement or renal insult given recent emesis.  Patient to be admitted for IV antibiotics to gain control of cellulitis.  I have discussed with hospital medicine who will assume care.  Choice of antibiotics to be initiated in the emergency department at their discretion.    Diagnoses:    1. LUE cellulitis  2. Syncope  3. Chest pain       Justin Vasquez MD  02/25/24 2005

## 2024-02-25 NOTE — Clinical Note
Diagnosis: Syncope, unspecified syncope type [7228192]   Future Attending Provider: TODD BERNARDO [[id number]]   Place in Observation: [facility] [[id number]]

## 2024-02-25 NOTE — LETTER
Patient: Michael Alonzo  YOB: 1975  Date: 2/25/2024 Time: 11:33 PM  Location: [facility]    Leaving the Hospital Against Medical Advice    Chart #:[id number]    This will certify that I, the undersigned,    ______________________________________________________________________    A patient in the above named medical center, having requested discharge and removal from the medical center against the advice of my attending physician(s), hereby release [facility], its physicians, officers and employees, severally and individually, from any and all liability of any nature whatsoever for any injury or harm or complication of any kind that may result directly or indirectly, by reason of my terminating my stay as a patient at [facility] and my departure from [facility], and hereby waive any and all rights of action I may now have or later acquire as a result of my voluntary departure from [facility] and the termination of my stay as a patient therein.    This release is made with the full knowledge of the danger that may result from the action which I am taking.      Date:_______________________                         ___________________________                                                                                    Patient/Legal Representative    Witness:        ____________________________                          ___________________________  Nurse                                                                        Physician

## 2024-02-26 NOTE — ASSESSMENT & PLAN NOTE
Pt reports chest pain following syncopal episode  EKG prn Chest Pain  Troponin x 3  CBC, CMP, Mg, Phos daily  Fasting Lipid panel in am  TSH level  CXR  IV fluids  HEART score-3  ASA

## 2024-02-26 NOTE — HOSPITAL COURSE
Pt left AMA shortly after admission.  He received IV narcotics for pain and one dose of IV vancomycin and one dose IV ceftriaxone for treatment of LUE cellulitis.  Chest pain and syncope workup incomplete due to his decision to leave against medical advice.

## 2024-02-26 NOTE — DISCHARGE SUMMARY
"[facility] Medicine  Discharge Summary      Patient Name: Michael Alonzo Sr.  MRN: [id number]  GINO: [id number]  Patient Class: OP- Observation  Admission Date: 2/25/2024  Hospital Length of Stay: 0 days  Discharge Date and Time: 2/25/2024 11:49 PM  Attending Physician: No att. providers found   Discharging Provider: CATHERINE Calvert  Primary Care Provider: Michelle Patrick NP    Primary Care Team: Networked reference to record PCT     HPI:   Michael Alonzo Sr. Is a 47 y/o M with a past medical history significant for DM2, stroke, thyroid disease, and COPD who presented to the ED with multiple complaints.  He was seen in the ED at an outside facility yesterday for pain and redness to the left upper extremity.  He was prescribed keflex and discharged from the ED.  Xray left elbow shows superficial soft tissue swelling without joint effusion and a metallic foreign body about the radial anterior aspect.  Pt failed to fill his antibiotics prescribed yesterday; however, he did fill the norco prescribed for pain.  States the redness and swelling to the LUE has significantly increased in size from yesterday.  Pain is uncontrolled with norco and is exacerbated by movement.  He denies injury.  He denies salt or brackish water exposure.  No recent travel history.  No associated numbness or weakness.  He does have multiple "fire-ant bites" to BUE.  Additionally, pt c/o syncopal episode earlier today after walking into a store with his son and had a brief period of lightheadedness without vertigo followed by syncope.  He denies injury in the fall.  He did have sternal, nonradiating, non migrating chest pressure following the episode of syncope lasting minutes that is now resolved.  There was no associated dyspnea, diaphoresis.  He has had intermittent nausea as well which he attributes to the increased LUE pain.  Denies abdominal pain, but does state that sometimes he "goes for days" " without voiding.  Initial labs obtained in the ED were unremarkable.  CRP later obtained increased 121.  He was given IV vancomycin and IV ceftriaxone in the ED and will be placed in observation under the service of hospital medicine for continued workup and treatment.       * No surgery found *      Hospital Course:   Pt left AMA shortly after admission.  He received IV narcotics for pain and one dose of IV vancomycin and one dose IV ceftriaxone for treatment of LUE cellulitis.  Chest pain and syncope workup incomplete due to his decision to leave against medical advice.     Goals of Care Treatment Preferences:  Code Status: Full Code      Consults:     No new Assessment & Plan notes have been filed under this hospital service since the last note was generated.  Service: Hospital Medicine    Final Active Diagnoses:    Diagnosis Date Noted POA    PRINCIPAL PROBLEM:  Syncope [R55] 02/25/2024 Yes    Cellulitis of left upper extremity [L03.114] 02/25/2024 Yes    Chest pain [R07.9] 02/25/2024 Yes    Type 2 diabetes mellitus with diabetic polyneuropathy, without long-term current use of insulin [E11.42] 03/29/2022 Yes    Centrilobular emphysema [J43.2] 05/25/2021 Yes    Hypothyroidism [E03.9] 07/20/2017 Yes    History of stroke [Z86.73] 07/20/2017 Not Applicable    Dependence on nicotine from cigarettes [F17.210] 07/20/2017 Yes      Problems Resolved During this Admission:       Discharged Condition: against medical advice    Disposition: Left Against Medical Adv*    Follow Up:    Patient Instructions:   No discharge procedures on file.    Significant Diagnostic Studies: Labs: CMP   Recent Labs   Lab 02/25/24  1804      K 3.8      CO2 24   GLU 98   BUN 7   CREATININE 0.8   CALCIUM 9.1   ANIONGAP 11   , CBC   Recent Labs   Lab 02/25/24  1804   WBC 8.68   HGB 12.2*   HCT 35.4*      , Troponin   Recent Labs   Lab 02/25/24  1804   TROPONINI <0.006   , and All labs within the past 24 hours have been  reviewed    Pending Diagnostic Studies:       None           Medications:  Not reviewed prior to discharge    Indwelling Lines/Drains at time of discharge:   Lines/Drains/Airways       None                   Time spent on the discharge of patient: 15 minutes         CATHERINE Calvert  Department of Hospital Medicine  [facility]

## 2024-02-26 NOTE — ASSESSMENT & PLAN NOTE
Patient's COPD is controlled currently.  Patient is currently off COPD Pathway. Continue scheduled inhalers Antibiotics and Supplemental oxygen as needed and monitor respiratory status closely. PRN nebs.

## 2024-02-26 NOTE — HPI
"Michael Alonzo Sr. Is a 49 y/o M with a past medical history significant for DM2, stroke, thyroid disease, and COPD who presented to the ED with multiple complaints.  He was seen in the ED at an outside facility yesterday for pain and redness to the left upper extremity.  He was prescribed keflex and discharged from the ED.  Xray left elbow shows superficial soft tissue swelling without joint effusion and a metallic foreign body about the radial anterior aspect.  Pt failed to fill his antibiotics prescribed yesterday; however, he did fill the norco prescribed for pain.  States the redness and swelling to the LUE has significantly increased in size from yesterday.  Pain is uncontrolled with norco and is exacerbated by movement.  He denies injury.  He denies salt or brackish water exposure.  No recent travel history.  No associated numbness or weakness.  He does have multiple "fire-ant bites" to BUE.  Additionally, pt c/o syncopal episode earlier today after walking into a store with his son and had a brief period of lightheadedness without vertigo followed by syncope.  He denies injury in the fall.  He did have sternal, nonradiating, non migrating chest pressure following the episode of syncope lasting minutes that is now resolved.  There was no associated dyspnea, diaphoresis.  He has had intermittent nausea as well which he attributes to the increased LUE pain.  Denies abdominal pain, but does state that sometimes he "goes for days" without voiding.  Initial labs obtained in the ED were unremarkable.  CRP later obtained increased 121.  He was given IV vancomycin and IV ceftriaxone in the ED and will be placed in observation under the service of hospital medicine for continued workup and treatment.     "

## 2024-02-26 NOTE — ED NOTES
"Pt asking for "papers" to sign as he has been told he will not be able to go to the parking lot and smoke. Informed pt hospital is a non smoking facility and that NP had ordered a nicotine patch for pt. Pt stated "if you smell something funny in here just know I lit up in this room." Explained to pt he would be escorted off the property should he smoke in his room. Pt agreed to try the nicotine patch. Wife present at pt bedside for conversation, aware that pt is not allowed to leave building to smoke in the parking lot.     " no

## 2024-02-26 NOTE — ASSESSMENT & PLAN NOTE
CT of head without contrast  Echocardiogram  Daily CBC, BMP  Check Mg, Phos  Carotid U/S  Check orthostatic v/s  IV fluids - gentle  Telemetry monitoring  Hold chronic gabapentin for now to avoid over sedation as he is currently requiring opioid pain medication for cellulitis LUE

## 2024-02-26 NOTE — PROGRESS NOTES
"Pharmacokinetic Initial Assessment: IV Vancomycin    Assessment/Plan:    Initiate intravenous vancomycin with loading dose of 1750 mg once followed by a maintenance dose of vancomycin 1750mg IV every 12 hours  Desired empiric serum trough concentration is 10 to 15 mcg/mL  Draw vancomycin trough level 60 min prior to fourth dose on 2/27 at approximately 0730  Pharmacy will continue to follow and monitor vancomycin.      Please contact pharmacy at extension [phone] with any questions regarding this assessment.     Thank you for the consult,   Leandro Kay       Patient brief summary:  Michael Alonzo Sr. is a 48 y.o. male initiated on antimicrobial therapy with IV Vancomycin for treatment of suspected skin & soft tissue infection    Drug Allergies:   Review of patient's allergies indicates:   Allergen Reactions    Amitriptyline Anaphylaxis and Itching    Decadron [dexamethasone sodium phosphate] Anaphylaxis    Paroxetine Anaphylaxis    Tramadol Anaphylaxis    Tomato (solanum lycopersicum) Hives       Actual Body Weight:   117.5 KG    Renal Function:   Estimated Creatinine Clearance: 156 mL/min (based on SCr of 0.8 mg/dL).,     Dialysis Method (if applicable):  N/A    CBC (last 72 hours):  Recent Labs   Lab Result Units 02/25/24  1804   WBC K/uL 8.68   Hemoglobin g/dL 12.2*   Hematocrit % 35.4*   Platelets K/uL 207   Gran % % 61.1   Lymph % % 27.8   Mono % % 8.8   Eosinophil % % 1.4   Basophil % % 0.7   Differential Method  Automated       Metabolic Panel (last 72 hours):  Recent Labs   Lab Result Units 02/25/24  1804   Sodium mmol/L 137   Potassium mmol/L 3.8   Chloride mmol/L 102   CO2 mmol/L 24   Glucose mg/dL 98   BUN mg/dL 7   Creatinine mg/dL 0.8       Drug levels (last 3 results):  No results for input(s): "VANCOMYCINRA", "VANCORANDOM", "VANCOMYCINPE", "VANCOPEAK", "VANCOMYCINTR", "VANCOTROUGH" in the last 72 hours.    Microbiologic Results:  Microbiology Results (last 7 days)       ** No results found for the last " 168 hours. **

## 2024-02-26 NOTE — ASSESSMENT & PLAN NOTE
Patient has chronic hypothyroidism. TFTs reviewed-   Lab Results   Component Value Date    TSH 2.122 02/25/2024   . Will continue chronic levothyroxine and adjust for and clinical changes.

## 2024-02-26 NOTE — SUBJECTIVE & OBJECTIVE
Past Medical History:   Diagnosis Date    Arthritis     Asthma     Cellulitis of left upper extremity 3/27/2022    Diabetes mellitus     High cholesterol     Stroke     TIA    Thyroid disease        Past Surgical History:   Procedure Laterality Date    OTHER SURGICAL HISTORY      left hand    SPINAL FIXATION SURGERY         Review of patient's allergies indicates:   Allergen Reactions    Amitriptyline Anaphylaxis and Itching    Decadron [dexamethasone sodium phosphate] Anaphylaxis    Paroxetine Anaphylaxis    Tramadol Anaphylaxis    Tomato (solanum lycopersicum) Hives       No current facility-administered medications on file prior to encounter.     Current Outpatient Medications on File Prior to Encounter   Medication Sig    albuterol (PROAIR HFA) 90 mcg/actuation inhaler Inhale 2 puffs into the lungs every 6 (six) hours as needed for Wheezing. Rescue    aspirin (ECOTRIN) 81 MG EC tablet Take 1 tablet by mouth once daily.    busPIRone (BUSPAR) 15 MG tablet TAKE ONE TABLET BY MOUTH TWICE DAILY    diclofenac sodium (VOLTAREN) 1 % Gel Apply topically 4 (four) times daily.    docusate sodium (COLACE) 100 MG capsule Take 1 capsule by mouth daily as needed.    DULoxetine (CYMBALTA) 30 MG capsule TAKE ONE CAPSULE BY MOUTH ONCE DAILY    gabapentin (NEURONTIN) 800 MG tablet Take 1 tablet (800 mg total) by mouth 3 (three) times daily.    levothyroxine (SYNTHROID) 25 MCG tablet TAKE ONE TABLET BY MOUTH ONCE DAILY    meloxicam (MOBIC) 15 MG tablet Take 1 tablet (15 mg total) by mouth once daily.    metFORMIN (GLUCOPHAGE) 1000 MG tablet Take 1 tablet (1,000 mg total) by mouth once daily.    rosuvastatin (CRESTOR) 20 MG tablet Take 1 tablet (20 mg total) by mouth once daily.    traZODone (DESYREL) 50 MG tablet  (Patient not taking: Reported on 3/30/2023)     Family History       Problem Relation (Age of Onset)    Brain cancer Paternal Grandmother    Diabetes Father    Lung cancer Paternal Grandfather    Stroke Mother       "    Tobacco Use    Smoking status: Every Day     Current packs/day: 0.50     Types: Cigarettes    Smokeless tobacco: Never   Substance and Sexual Activity    Alcohol use: No    Drug use: No    Sexual activity: Yes     Partners: Female     Review of Systems   Constitutional:  Positive for appetite change and fatigue. Negative for chills and fever.   HENT:  Negative for congestion and sore throat.    Respiratory:  Negative for cough and shortness of breath.    Cardiovascular:  Negative for chest pain and palpitations.   Gastrointestinal:  Positive for nausea and vomiting. Negative for abdominal pain.   Genitourinary:  Positive for difficulty urinating. Negative for hematuria, scrotal swelling and testicular pain.   Musculoskeletal:  Positive for arthralgias and gait problem (chronic-uses a cane for ambulation).   Skin:  Positive for color change.        Multiple light pink raised circular areas to BUE, some with pustules, patient explains as "fire-ant bites"   Neurological:  Positive for dizziness, syncope, weakness (chronic right sided) and light-headedness.   Psychiatric/Behavioral:  Positive for sleep disturbance. Negative for agitation and confusion. The patient is nervous/anxious.    All other systems reviewed and are negative.    Objective:     Vital Signs (Most Recent):  Temp: 98.1 °F (36.7 °C) (02/25/24 1659)  Pulse: 85 (02/25/24 1902)  Resp: 18 (02/25/24 1832)  BP: 136/71 (02/25/24 1902)  SpO2: 95 % (02/25/24 1902) Vital Signs (24h Range):  Temp:  [98.1 °F (36.7 °C)] 98.1 °F (36.7 °C)  Pulse:  [83-86] 85  Resp:  [18-20] 18  SpO2:  [95 %-100 %] 95 %  BP: (126-148)/(66-83) 136/71     Weight: 117.5 kg (259 lb)  Body mass index is 32.37 kg/m².     Physical Exam  Constitutional:       General: He is not in acute distress.     Appearance: He is well-developed. He is ill-appearing.   HENT:      Head: Normocephalic and atraumatic.      Mouth/Throat:      Mouth: Mucous membranes are dry.   Eyes:      " Conjunctiva/sclera: Conjunctivae normal.      Pupils: Pupils are equal, round, and reactive to light.   Cardiovascular:      Rate and Rhythm: Normal rate and regular rhythm.      Heart sounds: Normal heart sounds.   Pulmonary:      Effort: Pulmonary effort is normal. No respiratory distress.      Breath sounds: Normal breath sounds.   Abdominal:      General: Bowel sounds are normal. There is distension.      Palpations: Abdomen is soft.      Tenderness: There is no abdominal tenderness.   Musculoskeletal:         General: Swelling (left elbow) and tenderness (left elbow) present. Normal range of motion.      Cervical back: Normal range of motion and neck supple.      Right lower leg: No edema.      Left lower leg: No edema.   Skin:     General: Skin is warm and dry.   Neurological:      Mental Status: He is alert and oriented to person, place, and time. Mental status is at baseline.   Psychiatric:         Mood and Affect: Mood is anxious.         Behavior: Behavior normal.         Thought Content: Thought content normal.              CRANIAL NERVES     CN III, IV, VI   Pupils are equal, round, and reactive to light.       Significant Labs: All pertinent labs within the past 24 hours have been reviewed.  CBC:   Recent Labs   Lab 02/25/24  1804   WBC 8.68   HGB 12.2*   HCT 35.4*        CMP:   Recent Labs   Lab 02/25/24  1804      K 3.8      CO2 24   GLU 98   BUN 7   CREATININE 0.8   CALCIUM 9.1   ANIONGAP 11     Lactic Acid:   Recent Labs   Lab 02/25/24  1804   LACTATE 0.9       Troponin:   Recent Labs   Lab 02/25/24  1804   TROPONINI <0.006       Significant Imaging: I have reviewed all pertinent imaging results/findings within the past 24 hours.  CXR: I have reviewed all pertinent results/findings within the past 24 hours and my personal findings are:  No acute findings by my read.

## 2024-02-26 NOTE — ASSESSMENT & PLAN NOTE
Keep left upper extremities elevated.   Continue IV ceftriaxone and IV Vancomycin.  Check blood glucose level q AC/HS.  Use Novolog Insulin Sliding Scale as needed.

## 2024-02-26 NOTE — H&P
"[facility]  History & Physical    Patient Name: Michael Alonzo Sr.  MRN: [id number]  Patient Class: OP- Observation  Admission Date: 2/25/2024  Attending Physician: Javed Medina MD   Primary Care Provider: Michelle Patrick NP         Patient information was obtained from patient, spouse/SO, past medical records, and ER records.     Subjective:     Principal Problem:Syncope    Chief Complaint:   Chief Complaint   Patient presents with    Chest Pain     Left arm swelling , warm to touch     Cellulitis     Left arm          HPI: Michael Alonzo Sr. Is a 47 y/o M with a past medical history significant for DM2, stroke, thyroid disease, and COPD who presented to the ED with multiple complaints.  He was seen in the ED at an outside facility yesterday for pain and redness to the left upper extremity.  He was prescribed keflex and discharged from the ED.  Xray left elbow shows superficial soft tissue swelling without joint effusion and a metallic foreign body about the radial anterior aspect.  Pt failed to fill his antibiotics prescribed yesterday; however, he did fill the norco prescribed for pain.  States the redness and swelling to the LUE has significantly increased in size from yesterday.  Pain is uncontrolled with norco and is exacerbated by movement.  He denies injury.  He denies salt or brackish water exposure.  No recent travel history.  No associated numbness or weakness.  He does have multiple "fire-ant bites" to BUE.  Additionally, pt c/o syncopal episode earlier today after walking into a store with his son and had a brief period of lightheadedness without vertigo followed by syncope.  He denies injury in the fall.  He did have sternal, nonradiating, non migrating chest pressure following the episode of syncope lasting minutes that is now resolved.  There was no associated dyspnea, diaphoresis.  He has had intermittent nausea as well which he attributes to the " "increased LUE pain.  Denies abdominal pain, but does state that sometimes he "goes for days" without voiding.  Initial labs obtained in the ED were unremarkable.  CRP later obtained increased 121.  He was given IV vancomycin and IV ceftriaxone in the ED and will be placed in observation under the service of hospital medicine for continued workup and treatment.       Past Medical History:   Diagnosis Date    Arthritis     Asthma     Cellulitis of left upper extremity 3/27/2022    Diabetes mellitus     High cholesterol     Stroke     TIA    Thyroid disease        Past Surgical History:   Procedure Laterality Date    OTHER SURGICAL HISTORY      left hand    SPINAL FIXATION SURGERY         Review of patient's allergies indicates:   Allergen Reactions    Amitriptyline Anaphylaxis and Itching    Decadron [dexamethasone sodium phosphate] Anaphylaxis    Paroxetine Anaphylaxis    Tramadol Anaphylaxis    Tomato (solanum lycopersicum) Hives       No current facility-administered medications on file prior to encounter.     Current Outpatient Medications on File Prior to Encounter   Medication Sig    albuterol (PROAIR HFA) 90 mcg/actuation inhaler Inhale 2 puffs into the lungs every 6 (six) hours as needed for Wheezing. Rescue    aspirin (ECOTRIN) 81 MG EC tablet Take 1 tablet by mouth once daily.    busPIRone (BUSPAR) 15 MG tablet TAKE ONE TABLET BY MOUTH TWICE DAILY    diclofenac sodium (VOLTAREN) 1 % Gel Apply topically 4 (four) times daily.    docusate sodium (COLACE) 100 MG capsule Take 1 capsule by mouth daily as needed.    DULoxetine (CYMBALTA) 30 MG capsule TAKE ONE CAPSULE BY MOUTH ONCE DAILY    gabapentin (NEURONTIN) 800 MG tablet Take 1 tablet (800 mg total) by mouth 3 (three) times daily.    levothyroxine (SYNTHROID) 25 MCG tablet TAKE ONE TABLET BY MOUTH ONCE DAILY    meloxicam (MOBIC) 15 MG tablet Take 1 tablet (15 mg total) by mouth once daily.    metFORMIN (GLUCOPHAGE) 1000 MG tablet Take 1 tablet (1,000 mg " "total) by mouth once daily.    rosuvastatin (CRESTOR) 20 MG tablet Take 1 tablet (20 mg total) by mouth once daily.    traZODone (DESYREL) 50 MG tablet  (Patient not taking: Reported on 3/30/2023)     Family History       Problem Relation (Age of Onset)    Brain cancer Paternal Grandmother    Diabetes Father    Lung cancer Paternal Grandfather    Stroke Mother          Tobacco Use    Smoking status: Every Day     Current packs/day: 0.50     Types: Cigarettes    Smokeless tobacco: Never   Substance and Sexual Activity    Alcohol use: No    Drug use: No    Sexual activity: Yes     Partners: Female     Review of Systems   Constitutional:  Positive for appetite change and fatigue. Negative for chills and fever.   HENT:  Negative for congestion and sore throat.    Respiratory:  Negative for cough and shortness of breath.    Cardiovascular:  Negative for chest pain and palpitations.   Gastrointestinal:  Positive for nausea and vomiting. Negative for abdominal pain.   Genitourinary:  Positive for difficulty urinating. Negative for hematuria, scrotal swelling and testicular pain.   Musculoskeletal:  Positive for arthralgias and gait problem (chronic-uses a cane for ambulation).   Skin:  Positive for color change.        Multiple light pink raised circular areas to BUE, some with pustules, patient explains as "fire-ant bites"   Neurological:  Positive for dizziness, syncope, weakness (chronic right sided) and light-headedness.   Psychiatric/Behavioral:  Positive for sleep disturbance. Negative for agitation and confusion. The patient is nervous/anxious.    All other systems reviewed and are negative.    Objective:     Vital Signs (Most Recent):  Temp: 98.1 °F (36.7 °C) (02/25/24 1659)  Pulse: 85 (02/25/24 1902)  Resp: 18 (02/25/24 1832)  BP: 136/71 (02/25/24 1902)  SpO2: 95 % (02/25/24 1902) Vital Signs (24h Range):  Temp:  [98.1 °F (36.7 °C)] 98.1 °F (36.7 °C)  Pulse:  [83-86] 85  Resp:  [18-20] 18  SpO2:  [95 %-100 %] 95 " %  BP: (126-148)/(66-83) 136/71     Weight: 117.5 kg (259 lb)  Body mass index is 32.37 kg/m².     Physical Exam  Constitutional:       General: He is not in acute distress.     Appearance: He is well-developed. He is ill-appearing.   HENT:      Head: Normocephalic and atraumatic.      Mouth/Throat:      Mouth: Mucous membranes are dry.   Eyes:      Conjunctiva/sclera: Conjunctivae normal.      Pupils: Pupils are equal, round, and reactive to light.   Cardiovascular:      Rate and Rhythm: Normal rate and regular rhythm.      Heart sounds: Normal heart sounds.   Pulmonary:      Effort: Pulmonary effort is normal. No respiratory distress.      Breath sounds: Normal breath sounds.   Abdominal:      General: Bowel sounds are normal. There is distension.      Palpations: Abdomen is soft.      Tenderness: There is no abdominal tenderness.   Musculoskeletal:         General: Swelling (left elbow) and tenderness (left elbow) present. Normal range of motion.      Cervical back: Normal range of motion and neck supple.      Right lower leg: No edema.      Left lower leg: No edema.   Skin:     General: Skin is warm and dry.   Neurological:      Mental Status: He is alert and oriented to person, place, and time. Mental status is at baseline.   Psychiatric:         Mood and Affect: Mood is anxious.         Behavior: Behavior normal.         Thought Content: Thought content normal.              CRANIAL NERVES     CN III, IV, VI   Pupils are equal, round, and reactive to light.       Significant Labs: All pertinent labs within the past 24 hours have been reviewed.  CBC:   Recent Labs   Lab 02/25/24  1804   WBC 8.68   HGB 12.2*   HCT 35.4*        CMP:   Recent Labs   Lab 02/25/24  1804      K 3.8      CO2 24   GLU 98   BUN 7   CREATININE 0.8   CALCIUM 9.1   ANIONGAP 11     Lactic Acid:   Recent Labs   Lab 02/25/24  1804   LACTATE 0.9       Troponin:   Recent Labs   Lab 02/25/24  1804   TROPONINI <0.006  "      Significant Imaging: I have reviewed all pertinent imaging results/findings within the past 24 hours.  CXR: I have reviewed all pertinent results/findings within the past 24 hours and my personal findings are:  No acute findings by my read.  Assessment/Plan:     * Syncope  CT of head without contrast  Echocardiogram  Daily CBC, BMP  Check Mg, Phos  Carotid U/S  Check orthostatic v/s  IV fluids - gentle  Telemetry monitoring  Hold chronic gabapentin for now to avoid over sedation as he is currently requiring opioid pain medication for cellulitis LUE        Cellulitis of left upper extremity  Keep left upper extremities elevated.   Continue IV ceftriaxone and IV Vancomycin.  Check blood glucose level q AC/HS.  Use Novolog Insulin Sliding Scale as needed.         Chest pain  Pt reports chest pain following syncopal episode  EKG prn Chest Pain  Troponin x 3  CBC, CMP, Mg, Phos daily  Fasting Lipid panel in am  TSH level  CXR  IV fluids  HEART score-3  ASA        Type 2 diabetes mellitus with diabetic polyneuropathy, without long-term current use of insulin  Patient's FSGs are controlled on current medication regimen.  Last A1c reviewed-   Lab Results   Component Value Date    HGBA1C 5.4 03/30/2023     Most recent fingerstick glucose reviewed- No results for input(s): "POCTGLUCOSE" in the last 24 hours.  Current correctional scale  Medium  Maintain anti-hyperglycemic dose as follows-   Antihyperglycemics (From admission, onward)      Start     Stop Route Frequency Ordered    02/25/24 2057  insulin aspart U-100 pen 1-10 Units         -- SubQ Before meals & nightly PRN 02/25/24 2057          Hold Oral hypoglycemics while patient is in the hospital.        Centrilobular emphysema  Patient's COPD is controlled currently.  Patient is currently off COPD Pathway. Continue scheduled inhalers Antibiotics and Supplemental oxygen as needed and monitor respiratory status closely. PRN nebs.    Dependence on nicotine from " cigarettes  Dangers of cigarette smoking were reviewed with patient in detail. Patient was Counseled for 3-10 minutes. Nicotine replacement options were discussed. Nicotine replacement was discussed- prescribed    Hypothyroidism  Patient has chronic hypothyroidism. TFTs reviewed-   Lab Results   Component Value Date    TSH 2.122 02/25/2024   . Will continue chronic levothyroxine and adjust for and clinical changes.        History of stroke  Noted.  Chronic right sided weakness  Maintain fall precautions        VTE Risk Mitigation (From admission, onward)           Ordered     enoxaparin injection 40 mg  Daily         02/25/24 2057     IP VTE HIGH RISK PATIENT  Once         02/25/24 2057     Place sequential compression device  Until discontinued         02/25/24 2057                         On 02/25/2024, patient should be placed in hospital observation services under my care in collaboration with Dr. Javed Medina.      Evangelina Cook, CATHERINE  Department of Hospital Medicine  [facility]

## 2024-02-29 NOTE — ASSESSMENT & PLAN NOTE
"Patient's FSGs are controlled on current medication regimen.  Last A1c reviewed-   Lab Results   Component Value Date    HGBA1C 5.4 03/30/2023     Most recent fingerstick glucose reviewed- No results for input(s): "POCTGLUCOSE" in the last 24 hours.  Current correctional scale  Medium  Maintain anti-hyperglycemic dose as follows-   Antihyperglycemics (From admission, onward)      Start     Stop Route Frequency Ordered    02/25/24 2057  insulin aspart U-100 pen 1-10 Units         -- SubQ Before meals & nightly PRN 02/25/24 2057          Hold Oral hypoglycemics while patient is in the hospital.      " Patient responding to voice and following commands

## 2024-03-02 LAB
OHS QRS DURATION: 98 MS
OHS QTC CALCULATION: 406 MS

## 2024-03-05 DIAGNOSIS — E11.42 TYPE 2 DIABETES MELLITUS WITH DIABETIC POLYNEUROPATHY, WITHOUT LONG-TERM CURRENT USE OF INSULIN: ICD-10-CM

## 2024-03-05 RX ORDER — GABAPENTIN 800 MG/1
800 TABLET ORAL 3 TIMES DAILY
Qty: 90 TABLET | Refills: 0 | Status: SHIPPED | OUTPATIENT
Start: 2024-03-05

## 2024-03-05 NOTE — LETTER
March 7, 2024    Michael Alonzo Sr.  [address] [place] [address]      Dear Natasha Clinton:    Please contact the office to schedule a follow-up appointment.    You may contact our office Monday - Friday, 7 am - 5 pm. It is very important to discuss your results with you to determine the best course of treatment.       Sincerely,    Marcio Valdez LPN

## 2025-04-26 NOTE — TELEPHONE ENCOUNTER
----- Message from Edith Rodriguez sent at 3/29/2023  2:43 PM CDT -----  Type:  Sooner Appointment Request    Caller is requesting a sooner appointment.  Caller declined first available appointment listed below.  Caller will not accept being placed on the waitlist and is requesting a message be sent to doctor.    Name of Caller:  pt  When is the first available appointment?  Pt need sooner appt - he had appt today but Su called to cancel     Best Call Back Number:  [phone]     Additional Information:  Please call pt to reschedule his appt. He feels he really needs to be seen asap.     He has been without his inhaler about 2 weeks now. He is living off his oxygen. Pharmacy sent request but it was denied until pt was seen by .     He is out of other meds as well.    URGENT!!    Thank you.       n/a